# Patient Record
Sex: FEMALE | Race: WHITE | NOT HISPANIC OR LATINO | Employment: PART TIME | URBAN - METROPOLITAN AREA
[De-identification: names, ages, dates, MRNs, and addresses within clinical notes are randomized per-mention and may not be internally consistent; named-entity substitution may affect disease eponyms.]

---

## 2017-01-23 ENCOUNTER — ALLSCRIPTS OFFICE VISIT (OUTPATIENT)
Dept: OTHER | Facility: OTHER | Age: 42
End: 2017-01-23

## 2017-01-23 DIAGNOSIS — Z12.31 ENCOUNTER FOR SCREENING MAMMOGRAM FOR MALIGNANT NEOPLASM OF BREAST: ICD-10-CM

## 2017-01-23 PROCEDURE — 87624 HPV HI-RISK TYP POOLED RSLT: CPT | Performed by: OBSTETRICS & GYNECOLOGY

## 2017-01-23 PROCEDURE — G0145 SCR C/V CYTO,THINLAYER,RESCR: HCPCS | Performed by: OBSTETRICS & GYNECOLOGY

## 2017-01-24 ENCOUNTER — LAB REQUISITION (OUTPATIENT)
Dept: LAB | Facility: HOSPITAL | Age: 42
End: 2017-01-24
Payer: COMMERCIAL

## 2017-01-24 DIAGNOSIS — Z01.419 ENCOUNTER FOR GYNECOLOGICAL EXAMINATION WITHOUT ABNORMAL FINDING: ICD-10-CM

## 2017-01-26 LAB
HPV RRNA GENITAL QL NAA+PROBE: NORMAL
LAB AP GYN PRIMARY INTERPRETATION: NORMAL
LAB AP LMP: NORMAL
Lab: NORMAL

## 2017-01-27 ENCOUNTER — GENERIC CONVERSION - ENCOUNTER (OUTPATIENT)
Dept: OTHER | Facility: OTHER | Age: 42
End: 2017-01-27

## 2017-03-28 ENCOUNTER — GENERIC CONVERSION - ENCOUNTER (OUTPATIENT)
Dept: OTHER | Facility: OTHER | Age: 42
End: 2017-03-28

## 2017-10-10 ENCOUNTER — APPOINTMENT (OUTPATIENT)
Dept: LAB | Age: 42
End: 2017-10-10
Attending: PREVENTIVE MEDICINE

## 2017-10-10 ENCOUNTER — TRANSCRIBE ORDERS (OUTPATIENT)
Dept: ADMINISTRATIVE | Age: 42
End: 2017-10-10

## 2017-10-10 DIAGNOSIS — Z01.84 IMMUNITY STATUS TESTING: ICD-10-CM

## 2017-10-10 DIAGNOSIS — Z01.84 IMMUNITY STATUS TESTING: Primary | ICD-10-CM

## 2017-10-10 LAB — RUBV IGG SERPL IA-ACNC: >175 IU/ML

## 2017-10-10 PROCEDURE — 86735 MUMPS ANTIBODY: CPT

## 2017-10-10 PROCEDURE — 86480 TB TEST CELL IMMUN MEASURE: CPT

## 2017-10-10 PROCEDURE — 86787 VARICELLA-ZOSTER ANTIBODY: CPT

## 2017-10-10 PROCEDURE — 86765 RUBEOLA ANTIBODY: CPT

## 2017-10-10 PROCEDURE — 86762 RUBELLA ANTIBODY: CPT

## 2017-10-10 PROCEDURE — 36415 COLL VENOUS BLD VENIPUNCTURE: CPT

## 2017-10-12 LAB
ANNOTATION COMMENT IMP: NORMAL
GAMMA INTERFERON BACKGROUND BLD IA-ACNC: 0.03 IU/ML
M TB IFN-G BLD-IMP: NEGATIVE
M TB IFN-G CD4+ BCKGRND COR BLD-ACNC: 0.01 IU/ML
M TB IFN-G CD4+ T-CELLS BLD-ACNC: 0.04 IU/ML
MEV IGG SER QL: NORMAL
MITOGEN IGNF BLD-ACNC: 9.22 IU/ML
MUV IGG SER QL: NORMAL
QUANTIFERON-TB GOLD IN TUBE: NORMAL
SERVICE CMNT-IMP: NORMAL
VZV IGG SER IA-ACNC: NORMAL

## 2017-12-06 ENCOUNTER — ALLSCRIPTS OFFICE VISIT (OUTPATIENT)
Dept: OTHER | Facility: OTHER | Age: 42
End: 2017-12-06

## 2018-01-10 NOTE — RESULT NOTES
Verified Results  (1) THIN PREP PAP WITH IMAGING [id number] 12:00AM Estefany Alba     Test Name Result Flag Reference   LAB AP CASE REPORT (Report)     Gynecologic Cytology Report            Case: [id number]                  Authorizing Provider: Susie Chamberlain MD  Collected:      01/23/2017           First Screen:     NILAY Last Received:      01/25/2017 1216        Specimen:  LIQUID-BASED PAP, SCREENING, Endocervical   HPV HIGH RISK RESULT (Report)     HPV, High Risk: HPV NEG, HPV16 NEG, HPV18 NEG      Other High Risk HPV Negative, HPV 16 Negative, HPV 18 Negative  HPV types: 16,18,31,33,35,39,45,51,52,56,58,59,66 and 68 DNA are undetectable or below the pre-set threshold  Roche?s FDA approved Bess 4800 is utilized with strict adherence to the ?s instruction  manual to test for the presence of High-Risk HPV DNA, as well as HPV 16 and HPV 18  This instrument  has been validated by our laboratory and/or by the   A negative result does not preclude the presence of HPV infection because results depend on adequate  specimen collection, absence of inhibitors and sufficient DNA to be detected  Additionally, HPV negative  results are not intended to prevent women from proceeding to colposcopy if clinically warranted  Positive HPV test results indicate the presence of any one or more of the high risk types, but since patients  are often co-infected with low-risk types it does not rule out the presence of low-risk types in patients  with mixed infections  LAB AP GYN PRIMARY INTERPRETATION      Negative for intraepithelial lesion or malignancy  Electronically signed by NILAY Last on 1/26/2017 at 3:47 PM   LAB AP GYN SPECIMEN ADEQUACY      Satisfactory for evaluation  Scant cellularity     LAB AP GYN ADDITIONAL INFORMATION (Report)     [employer]'s FDA approved ,  and ThinPrep Imaging System are   utilized with strict adherence to the 's instruction manual to   prepare gynecologic and non-gynecologic cytology specimens for the   production of ThinPrep slides as well as for gynecologic ThinPrep imaging  These processes have been validated by our laboratory and/or by the     The Pap test is not a diagnostic procedure and should not be used as the   sole means to detect cervical cancer  It is only a screening procedure to   aid in the detection of cervical cancer and its precursors  Both   false-negative and false-positive results have been experienced  Your   patient's test result should be interpreted in this context together with   the history and clinical findings     LAB AP LMP 1/11/2017

## 2018-01-13 VITALS
HEART RATE: 88 BPM | HEIGHT: 63 IN | WEIGHT: 103.5 LBS | BODY MASS INDEX: 18.34 KG/M2 | DIASTOLIC BLOOD PRESSURE: 62 MMHG | SYSTOLIC BLOOD PRESSURE: 96 MMHG | RESPIRATION RATE: 16 BRPM

## 2018-01-22 VITALS — BODY MASS INDEX: 18.96 KG/M2 | HEIGHT: 63 IN | WEIGHT: 107 LBS

## 2018-01-23 NOTE — CONSULTS
History of Present Illness  Today, I am seeing Mrs Andre Mancia for the first time  She is a healthy 38years old female who complaints about the wrinkles on lateral aspect of eyelid and crow's feet when she smiles  Patient refers she has had 20 Units of Botox in the past with a good result although noticed a difference between the two sides  Discussion/Summary    Ms Sylvia Juares is a 38 y o  female presenting with several cosmetic concerns  Her main priority is the appearance of her lateral lower eyelid and crow's-feet dynamic wrinkles  We discussed the use of neurotoxin to treat this areas  We reviewed the general risks of botulinum toxin, and more specifically the risks of injections when working around the eye, including bruising, asymmetry, vision impairment  She proceed with Botox injection  See above procedure note for details  Total of 15 units to treat bilateal crow'sfeet was used  Tolerated well  I discussed in detail about having a routine skin regimen and use of daily sunblock  Patient was also refereed to meet out        Follow up in 3-4 months PRN      Signatures   Electronically signed by : DARRELL Clinton ; Dec  6 2017  9:15AM EST                       (Author)

## 2018-04-08 ENCOUNTER — HOSPITAL ENCOUNTER (EMERGENCY)
Facility: HOSPITAL | Age: 43
Discharge: HOME | End: 2018-04-08
Attending: EMERGENCY MEDICINE | Admitting: EMERGENCY MEDICINE
Payer: COMMERCIAL

## 2018-04-08 ENCOUNTER — APPOINTMENT (EMERGENCY)
Dept: RADIOLOGY | Facility: HOSPITAL | Age: 43
End: 2018-04-08
Payer: COMMERCIAL

## 2018-04-08 VITALS
BODY MASS INDEX: 19.92 KG/M2 | TEMPERATURE: 98.4 F | RESPIRATION RATE: 18 BRPM | OXYGEN SATURATION: 100 % | DIASTOLIC BLOOD PRESSURE: 86 MMHG | HEART RATE: 87 BPM | WEIGHT: 112.43 LBS | HEIGHT: 63 IN | SYSTOLIC BLOOD PRESSURE: 116 MMHG

## 2018-04-08 DIAGNOSIS — R00.2 HEART PALPITATIONS: ICD-10-CM

## 2018-04-08 DIAGNOSIS — I49.3 PVC'S (PREMATURE VENTRICULAR CONTRACTIONS): Primary | ICD-10-CM

## 2018-04-08 LAB
ALBUMIN SERPL BCP-MCNC: 3.9 G/DL (ref 3.5–5)
ALP SERPL-CCNC: 47 U/L (ref 46–116)
ALT SERPL W P-5'-P-CCNC: 22 U/L (ref 12–78)
ANION GAP SERPL CALCULATED.3IONS-SCNC: 9 MMOL/L (ref 4–13)
AST SERPL W P-5'-P-CCNC: 13 U/L (ref 5–45)
ATRIAL RATE: 77 BPM
BASOPHILS # BLD AUTO: 0.04 THOUSANDS/ΜL (ref 0–0.1)
BASOPHILS NFR BLD AUTO: 1 % (ref 0–1)
BILIRUB SERPL-MCNC: 0.3 MG/DL (ref 0.2–1)
BUN SERPL-MCNC: 13 MG/DL (ref 5–25)
CALCIUM SERPL-MCNC: 8.5 MG/DL (ref 8.3–10.1)
CHLORIDE SERPL-SCNC: 105 MMOL/L (ref 100–108)
CO2 SERPL-SCNC: 25 MMOL/L (ref 21–32)
CREAT SERPL-MCNC: 0.78 MG/DL (ref 0.6–1.3)
EOSINOPHIL # BLD AUTO: 0.11 THOUSAND/ΜL (ref 0–0.61)
EOSINOPHIL NFR BLD AUTO: 2 % (ref 0–6)
ERYTHROCYTE [DISTWIDTH] IN BLOOD BY AUTOMATED COUNT: 12.3 % (ref 11.6–15.1)
GFR SERPL CREATININE-BSD FRML MDRD: 92 ML/MIN/1.73SQ M
GLUCOSE SERPL-MCNC: 106 MG/DL (ref 65–140)
HCT VFR BLD AUTO: 38.5 % (ref 34.8–46.1)
HGB BLD-MCNC: 12.5 G/DL (ref 11.5–15.4)
LYMPHOCYTES # BLD AUTO: 1.67 THOUSANDS/ΜL (ref 0.6–4.47)
LYMPHOCYTES NFR BLD AUTO: 26 % (ref 14–44)
MCH RBC QN AUTO: 30.4 PG (ref 26.8–34.3)
MCHC RBC AUTO-ENTMCNC: 32.5 G/DL (ref 31.4–37.4)
MCV RBC AUTO: 94 FL (ref 82–98)
MONOCYTES # BLD AUTO: 0.79 THOUSAND/ΜL (ref 0.17–1.22)
MONOCYTES NFR BLD AUTO: 12 % (ref 4–12)
NEUTROPHILS # BLD AUTO: 3.85 THOUSANDS/ΜL (ref 1.85–7.62)
NEUTS SEG NFR BLD AUTO: 59 % (ref 43–75)
P AXIS: 54 DEGREES
PLATELET # BLD AUTO: 214 THOUSANDS/UL (ref 149–390)
PMV BLD AUTO: 9.4 FL (ref 8.9–12.7)
POTASSIUM SERPL-SCNC: 4.3 MMOL/L (ref 3.5–5.3)
PR INTERVAL: 98 MS
PROT SERPL-MCNC: 7.1 G/DL (ref 6.4–8.2)
QRS AXIS: 77 DEGREES
QRSD INTERVAL: 76 MS
QT INTERVAL: 358 MS
QTC INTERVAL: 405 MS
RBC # BLD AUTO: 4.11 MILLION/UL (ref 3.81–5.12)
SODIUM SERPL-SCNC: 139 MMOL/L (ref 136–145)
T WAVE AXIS: 63 DEGREES
TSH SERPL DL<=0.05 MIU/L-ACNC: 4.13 UIU/ML (ref 0.36–3.74)
VENTRICULAR RATE: 77 BPM
WBC # BLD AUTO: 6.46 THOUSAND/UL (ref 4.31–10.16)

## 2018-04-08 PROCEDURE — 85025 COMPLETE CBC W/AUTO DIFF WBC: CPT

## 2018-04-08 PROCEDURE — 84443 ASSAY THYROID STIM HORMONE: CPT | Performed by: EMERGENCY MEDICINE

## 2018-04-08 PROCEDURE — 36415 COLL VENOUS BLD VENIPUNCTURE: CPT

## 2018-04-08 PROCEDURE — 93010 ELECTROCARDIOGRAM REPORT: CPT | Performed by: INTERNAL MEDICINE

## 2018-04-08 PROCEDURE — 99285 EMERGENCY DEPT VISIT HI MDM: CPT

## 2018-04-08 PROCEDURE — 93005 ELECTROCARDIOGRAM TRACING: CPT

## 2018-04-08 PROCEDURE — 80053 COMPREHEN METABOLIC PANEL: CPT

## 2018-04-09 NOTE — DISCHARGE INSTRUCTIONS
Heart Palpitations   WHAT YOU NEED TO KNOW:   Heart palpitations are feelings that your heart races, jumps, throbs, or flutters  You may feel extra beats, no beats for a short time, or skipped beats  You may have these feelings in your chest, throat, or neck  They may happen when you are sitting, standing, or lying  Heart palpitations may be frightening, but are usually not caused by a serious problem  DISCHARGE INSTRUCTIONS:   Call 911 or have someone else call for any of the following:   · You have any of the following signs of a heart attack:      ¨ Squeezing, pressure, or pain in your chest that lasts longer than 5 minutes or returns    ¨ Discomfort or pain in your back, neck, jaw, stomach, or arm     ¨ Trouble breathing    ¨ Nausea or vomiting    ¨ Lightheadedness or a sudden cold sweat, especially with chest pain or trouble breathing    · You have any of the following signs of a stroke:      ¨ Numbness or drooping on one side of your face     ¨ Weakness in an arm or leg    ¨ Confusion or difficulty speaking    ¨ Dizziness, a severe headache, or vision loss    · You faint or lose consciousness  Return to the emergency department if:   · Your palpitations happen more often or get more intense  Contact your healthcare provider if:   · You have new or worsening swelling in your feet or ankles  · You have questions or concerns about your condition or care  Follow up with your healthcare provider as directed: You may need to follow up with a cardiologist  Alberto Eisenberg may need tests to check for heart problems that cause palpitations  Write down your questions so you remember to ask them during your visits  Keep a record:  Write down when your palpitations start and stop, what you were doing when they started, and your symptoms  Keep track of what you ate or drank within a few hours of your palpitations  Include anything that seemed to help your symptoms, such as lying down or holding your breath   This record will help you and your healthcare provider learn what triggers your palpitations  Bring this record with you to your follow up visits  Help prevent heart palpitations:   · Manage stress and anxiety  Find ways to relax such as listening to music, meditating, or doing yoga  Exercise can also help decrease stress and anxiety  Talk to someone you trust about your stress or anxiety  You can also talk to a therapist      · Get plenty of sleep every night  Ask your healthcare provider how much sleep you need each night  · Do not drink caffeine or alcohol  Caffeine and alcohol can make your palpitations worse  Caffeine is found in soda, coffee, tea, chocolate, and drinks that increase your energy  · Do not smoke  Nicotine and other chemicals in cigarettes and cigars may damage your heart and blood vessels  Ask your healthcare provider for information if you currently smoke and need help to quit  E-cigarettes or smokeless tobacco still contain nicotine  Talk to your healthcare provider before you use these products  · Do not use illegal drugs  Talk to your healthcare provider if you use illegal drugs and want help to quit  © 2017 [facility] Information is for End User's use only and may not be sold, redistributed or otherwise used for commercial purposes  All illustrations and images included in CareNotes® are the copyrighted property of A D [employer] M [employer]  or Jamie Dilma  The above information is an  only  It is not intended as medical advice for individual conditions or treatments  Talk to your doctor, nurse or pharmacist before following any medical regimen to see if it is safe and effective for you  Premature Ventricular Contractions   WHAT YOU NEED TO KNOW:   Premature ventricular contractions (PVCs) are an interruption in your heart rhythm  They are caused by an early signal for your heart to pump   Your risk of PVCs increases when you drink alcohol or caffeine, or if you are fatigued or stressed  It is very important for you to follow up with your healthcare provider so the cause of your PVC can be diagnosed and treated  DISCHARGE INSTRUCTIONS:   Follow up with your healthcare provider as directed: You may need another EKG within the first 10 days and more testing for up to 12 months  Write down your questions so you remember to ask them during your visits  Medicines:   · Heart medicine  may be given to make your heart beat at a regular rate and rhythm  · Take your medicine as directed  Contact your healthcare provider if you think your medicine is not helping or if you have side effects  Tell him if you are allergic to any medicine  Keep a list of the medicines, vitamins, and herbs you take  Include the amounts, and when and why you take them  Bring the list or the pill bottles to follow-up visits  Carry your medicine list with you in case of an emergency  Contact your healthcare provider if:   · You still have symptoms after treatment, or your symptoms worsen  · You have questions or concerns about your condition or care  Seek care immediately or call 911 if:   · You have any of the following signs of a heart attack:      ¨ Squeezing, pressure, or pain in your chest that lasts longer than 5 minutes or returns    ¨ Discomfort or pain in your back, neck, jaw, stomach, or arm     ¨ Trouble breathing    ¨ Nausea or vomiting    ¨ Lightheadedness or a sudden cold sweat, especially with chest pain or trouble breathing    © 2017 [address] is for End User's use only and may not be sold, redistributed or otherwise used for commercial purposes  All illustrations and images included in CareNotes® are the copyrighted property of A D A M [employer]  or Jamie Perera  The above information is an  only  It is not intended as medical advice for individual conditions or treatments   Talk to your doctor, nurse or pharmacist before following any medical regimen to see if it is safe and effective for you

## 2018-04-09 NOTE — ED PROVIDER NOTES
History  Chief Complaint   Patient presents with    Irregular Heart Beat     Pt presents to ED for evaluation and treatment of irregular heartbeat since yesterday  Previous episode of same 3 weeks ago  Pt states "it feels like my heart stops"       History provided by:  Patient and spouse  Rapid Heart Rate   Palpitations quality:  Irregular  Onset quality:  Sudden  Duration:  1 second  Timing:  Intermittent  Progression:  Unchanged  Chronicity:  New  Context: anxiety    Context comment:  Patient notices occasional skipped beats in her chest when this happened she notices a sensation of a rush of blood up the left side of her neck, making her very anxious so she came here for evaluation  No associated pain  Relieved by:  None tried  Worsened by:  Nothing  Ineffective treatments:  None tried  Associated symptoms: no chest pain, no chest pressure, no cough, no diaphoresis, no dizziness, no nausea, no numbness, no shortness of breath and no vomiting        None       No past medical history on file  No past surgical history on file  No family history on file  I have reviewed and agree with the history as documented  Social History   Substance Use Topics    Smoking status: Never Smoker    Smokeless tobacco: Not on file    Alcohol use No        Review of Systems   Constitutional: Negative for activity change, chills, diaphoresis and fever  HENT: Negative for congestion, sinus pressure and sore throat  Eyes: Negative for pain and visual disturbance  Respiratory: Negative for cough, chest tightness, shortness of breath, wheezing and stridor  Cardiovascular: Positive for palpitations  Negative for chest pain  Gastrointestinal: Negative for abdominal distention, abdominal pain, constipation, diarrhea, nausea and vomiting  Genitourinary: Negative for dysuria and frequency  Musculoskeletal: Negative for neck pain and neck stiffness  Skin: Negative for rash     Neurological: Negative for dizziness, speech difficulty, light-headedness, numbness and headaches  Physical Exam  ED Triage Vitals   Temperature Pulse Respirations Blood Pressure SpO2   04/08/18 2030 04/08/18 2004 04/08/18 2004 04/08/18 2004 04/08/18 2004   98 4 °F (36 9 °C) 87 18 116/86 100 %      Temp Source Heart Rate Source Patient Position - Orthostatic VS BP Location FiO2 (%)   04/08/18 2030 04/08/18 2004 04/08/18 2004 04/08/18 2004 --   Oral Monitor Lying Right arm       Pain Score       04/08/18 2004       No Pain           Orthostatic Vital Signs  Vitals:    04/08/18 2004   BP: 116/86   Pulse: 87   Patient Position - Orthostatic VS: Lying       Physical Exam   Constitutional: She is oriented to person, place, and time  She appears well-developed  No distress  HENT:   Head: Normocephalic and atraumatic  Eyes: Pupils are equal, round, and reactive to light  Neck: Normal range of motion  Neck supple  No tracheal deviation present  Cardiovascular: Normal rate, regular rhythm, normal heart sounds and intact distal pulses  No murmur heard  Pulmonary/Chest: Effort normal and breath sounds normal  No stridor  No respiratory distress  Abdominal: Soft  She exhibits no distension  There is no tenderness  There is no rebound and no guarding  Musculoskeletal: Normal range of motion  Neurological: She is alert and oriented to person, place, and time  Skin: Skin is warm and dry  She is not diaphoretic  No erythema  No pallor  Psychiatric: Her mood appears anxious  Vitals reviewed        ED Medications  Medications - No data to display    Diagnostic Studies  Results Reviewed     Procedure Component Value Units Date/Time    TSH [[id number]]  (Abnormal) Collected:  04/08/18 2010    Lab Status:  Final result Specimen:  Blood from Arm, Right Updated:  04/08/18 2107     TSH [address] [place] [address] 4 128 (H) uIU/mL     Narrative:         Patients undergoing fluorescein dye angiography may retain small amounts of fluorescein in the body for 48-72 hours post procedure  Samples containing fluorescein can produce falsely depressed TSH values  If the patient had this procedure,a specimen should be resubmitted post fluorescein clearance  The recommended reference ranges for TSH during pregnancy are as follows:  First trimester 0 1 to 2 5 uIU/mL  Second trimester  0 2 to 3 0 uIU/mL  Third trimester 0 3 to 3 0 uIU/m      Comprehensive metabolic panel [[id number]] Collected:  04/08/18 2010    Lab Status:  Final result Specimen:  Blood from Arm, Right Updated:  04/08/18 2036     Sodium 139 mmol/L      Potassium 4 3 mmol/L      Chloride 105 mmol/L      CO2 25 mmol/L      Anion Gap 9 mmol/L      BUN 13 mg/dL      Creatinine 0 78 mg/dL      Glucose 106 mg/dL      Calcium 8 5 mg/dL      AST 13 U/L      ALT 22 U/L      Alkaline Phosphatase 47 U/L      Total Protein 7 1 g/dL      Albumin 3 9 g/dL      Total Bilirubin 0 30 mg/dL      eGFR 92 ml/min/1 73sq m     Narrative:         National Kidney Disease Education Program recommendations are as follows:  GFR calculation is accurate only with a steady state creatinine  Chronic Kidney disease less than 60 ml/min/1 73 sq  meters  Kidney failure less than 15 ml/min/1 73 sq  meters      CBC and differential [[id number]]  (Normal) Collected:  04/08/18 2010    Lab Status:  Final result Specimen:  Blood from Arm, Right Updated:  04/08/18 2021     WBC 6 46 Thousand/uL      RBC 4 11 Million/uL      Hemoglobin 12 5 g/dL      Hematocrit 38 5 %      MCV 94 fL      MCH 30 4 pg      MCHC 32 5 g/dL      RDW 12 3 %      MPV 9 4 fL      Platelets 331 Thousands/uL      Neutrophils Relative 59 %      Lymphocytes Relative 26 %      Monocytes Relative 12 %      Eosinophils Relative 2 %      Basophils Relative 1 %      Neutrophils Absolute 3 85 Thousands/µL      Lymphocytes Absolute 1 67 Thousands/µL      Monocytes Absolute 0 79 Thousand/µL      Eosinophils Absolute 0 11 Thousand/µL      Basophils Absolute 0 04 Thousands/µL No orders to display              Procedures  ECG 12 Lead Documentation  Date/Time: 4/8/2018 8:14 PM  Performed by: Srikanth Carpio by: Lara Ascencio     ECG reviewed by me, the ED Provider: yes    Patient location:  ED  Previous ECG:     Previous ECG:  Unavailable  Interpretation:     Interpretation: normal    Rate:     ECG rate:  77    ECG rate assessment: normal    Rhythm:     Rhythm: sinus rhythm    Ectopy:     Ectopy: none    QRS:     QRS axis:  Normal    QRS intervals:  Normal  Conduction:     Conduction: normal    ST segments:     ST segments:  Normal  T waves:     T waves: normal               Phone Contacts  ED Phone Contact    ED Course  ED Course                                MDM  Number of Diagnoses or Management Options  Heart palpitations: new and requires workup  PVC's (premature ventricular contractions): new and requires workup     Amount and/or Complexity of Data Reviewed  Clinical lab tests: ordered and reviewed  Decide to obtain previous medical records or to obtain history from someone other than the patient: yes  Obtain history from someone other than the patient: yes  Review and summarize past medical records: yes  Independent visualization of images, tracings, or specimens: yes      CritCare Time    Disposition  Final diagnoses:   Heart palpitations   PVC's (premature ventricular contractions)     Time reflects when diagnosis was documented in both MDM as applicable and the Disposition within this note     Time User Action Codes Description Comment    4/8/2018  9:09 PM Brooks WASSERMAN Add [R00 2] Heart palpitations     4/8/2018  9:09 PM Brooks WASSERMAN Add [I49 3] PVC's (premature ventricular contractions)     4/8/2018  9:09 PM Greta Yeung Modify [R00 2] Heart palpitations     4/8/2018  9:09 PM Greta Yeung Modify [I49 3] PVC's (premature ventricular contractions)       ED Disposition     ED Disposition Condition Comment    Discharge  Vanessa Labor discharge to home/self care  Condition at discharge: Good        Follow-up Information     Follow up With Specialties Details Why Nick Puentes III, MD Internal Medicine Go to If symptoms worsen [address] [facility] [phone]          Patient's Medications    No medications on file     No discharge procedures on file      ED Provider  Electronically Signed by           Nguyen Pablo DO  04/08/18 3427

## 2018-04-12 RX ORDER — TRETINOIN 0.5 MG/G
CREAM TOPICAL
COMMUNITY
Start: 2017-09-29 | End: 2018-10-03 | Stop reason: ALTCHOICE

## 2018-04-13 ENCOUNTER — OFFICE VISIT (OUTPATIENT)
Dept: FAMILY MEDICINE CLINIC | Facility: CLINIC | Age: 43
End: 2018-04-13
Payer: COMMERCIAL

## 2018-04-13 ENCOUNTER — APPOINTMENT (OUTPATIENT)
Dept: LAB | Facility: CLINIC | Age: 43
End: 2018-04-13
Payer: COMMERCIAL

## 2018-04-13 VITALS
RESPIRATION RATE: 16 BRPM | WEIGHT: 107.4 LBS | DIASTOLIC BLOOD PRESSURE: 60 MMHG | OXYGEN SATURATION: 99 % | HEART RATE: 78 BPM | BODY MASS INDEX: 19.03 KG/M2 | HEIGHT: 63 IN | SYSTOLIC BLOOD PRESSURE: 116 MMHG

## 2018-04-13 DIAGNOSIS — R79.89 ELEVATED TSH: Primary | ICD-10-CM

## 2018-04-13 DIAGNOSIS — F51.01 PRIMARY INSOMNIA: ICD-10-CM

## 2018-04-13 DIAGNOSIS — R79.89 ELEVATED TSH: ICD-10-CM

## 2018-04-13 DIAGNOSIS — I49.3 PVC (PREMATURE VENTRICULAR CONTRACTION): ICD-10-CM

## 2018-04-13 DIAGNOSIS — F32.1 CURRENT MODERATE EPISODE OF MAJOR DEPRESSIVE DISORDER WITHOUT PRIOR EPISODE (HCC): ICD-10-CM

## 2018-04-13 LAB — TSH SERPL DL<=0.05 MIU/L-ACNC: 1.98 UIU/ML (ref 0.36–3.74)

## 2018-04-13 PROCEDURE — 36415 COLL VENOUS BLD VENIPUNCTURE: CPT

## 2018-04-13 PROCEDURE — 99204 OFFICE O/P NEW MOD 45 MIN: CPT | Performed by: PHYSICIAN ASSISTANT

## 2018-04-13 PROCEDURE — 84443 ASSAY THYROID STIM HORMONE: CPT

## 2018-04-13 NOTE — ASSESSMENT & PLAN NOTE
TSH of 4 1 at the ED 5 days ago  Slightly elevated  It may have a role with insomnia and activated mood  - Repeat TSH with T4 to recheck

## 2018-04-13 NOTE — ASSESSMENT & PLAN NOTE
Stressors include work and marriage  Neither are likely to change soon  Pt desires not to be on long term medical management  PHQ-9 score of 15  - Recommended Cognitive behavioral therapy  Discussed [employer] program which is free to employees  - Referral to see Therapist  - Requested FU in 1 month  - Discussed short term SSRI surrounding PMS time period  Pt deferred at this time

## 2018-04-13 NOTE — PROGRESS NOTES
Assessment/Plan:    Current moderate episode of major depressive disorder without prior episode ([facility] [address] )  Stressors include work and marriage  Neither are likely to change soon  Pt desires not to be on long term medical management  PHQ-9 score of 15  - Recommended Cognitive behavioral therapy  Discussed [employer] program which is free to employees  - Referral to see Therapist  - Requested FU in 1 month  - Discussed short term SSRI surrounding PMS time period  Pt deferred at this time  Primary insomnia  Issue is sleep maintenance  Her sleep hygiene seems adequate  - Discussed Z drugs that may help with sleep maintenance  - Pt will continue trying melatonin and see if CBT will improve her sleep first       Elevated TSH  TSH of 4 1 at the ED 5 days ago  Slightly elevated  It may have a role with insomnia and activated mood  - Repeat TSH with T4 to recheck  PVC (premature ventricular contraction)  She previously received a cardiac workup, 6 years ago  Recent exacerbations may be secondary to depression  - Continued observation       Diagnoses and all orders for this visit:    Elevated TSH  -     TSH, 3rd generation with T4 reflex; Future    Primary insomnia    Current moderate episode of major depressive disorder [facility]  -     Ambulatory referral to Social Work; Future    PVC (premature ventricular contraction)  -     TSH, 3rd generation with T4 reflex; Future  -     Ambulatory referral to Cardiology; Future          Subjective:      Patient ID: Arnulfo Bailey is a 37 y o  female, here for an annual wellness visit  She is a RN at [employer]      She is having depressive symptoms including tearfulness She has not history with medications or seeing a therapist  She has not wanted to see anyone or take any medications and has worked in mental health in the past  She does express transient thoughts of harming her self but none recently and she has no plans and has never attempted to harm herself  She states she is committed to being there for her two children  She sleeps 4-6 hours a night for several years  It is aggrivated itself in the past 6 months  She increased her work to 50 hours a week and her  was laid off  She does not have a strong support system  She wakes 1-2 times a night  She has not problem with sleep induction  She has been "self-medicating" with Nyquil and benadryl which provided some improvement  She saw a cardiologist 6 year ago with an echo and stress test which was WNL  She has had palpitations intermittently in the past  2 weeks ago she was had them 2 days straight  Last weekend it was having every two minutes  She then went to the ED and was diagnosed with PCVs  She feels that it might be anxiety provoked  Previous Dental Visit: 6 month prior, no known dental issues  Previous Eye Exam: 2 years prior, no vision problems    Last Gyn: last year    Prior Vaccines:   - Last Tetanus vaccine: 2012    Last Pap: last year, normal findings  Last Mammo: Last year, dense breasts, WNL  The following portions of the patient's history were reviewed and updated as appropriate: allergies, current medications, past family history, past medical history, past social history, past surgical history and problem list     Review of Systems   Constitutional: Negative for activity change, chills, fatigue, fever and unexpected weight change  HENT: Negative for rhinorrhea, sinus pressure and sore throat  Eyes: Negative for visual disturbance  Respiratory: Negative for cough, shortness of breath and wheezing  Cardiovascular: Positive for palpitations  Negative for chest pain and leg swelling  Gastrointestinal: Negative for abdominal pain, constipation, diarrhea and nausea  Endocrine: Negative for cold intolerance and heat intolerance  Genitourinary: Negative for difficulty urinating  Musculoskeletal: Negative for arthralgias and myalgias     Skin: Negative for pallor, rash and wound  Allergic/Immunologic: Negative for environmental allergies and food allergies  Neurological: Negative for dizziness, seizures, speech difficulty, weakness and headaches  Hematological: Negative for adenopathy  Psychiatric/Behavioral: Positive for dysphoric mood and sleep disturbance  Negative for behavioral problems  The patient is nervous/anxious  Social History     Social History    Marital status: /Civil Union     Spouse name: N/A    Number of children: N/A    Years of education: N/A     Occupational History    Not on file  Social History Main Topics    Smoking status: Never Smoker    Smokeless tobacco: Never Used    Alcohol use No    Drug use: No    Sexual activity: Not Currently     Partners: Male     Other Topics Concern    Not on file     Social History Narrative    Exercise habits:             Objective:  /60   Pulse 78   Resp 16   Ht 5' 3" (1 6 m)   Wt 48 7 kg (107 lb 6 4 oz)   LMP 2018 (Approximate)   SpO2 99%   BMI 19 03 kg/m²     PHQ-9 Depression Screening    PHQ-9:    Frequency of the following problems over the past two weeks:       Little interest or pleasure in doing things:  3 - nearly every day  Feeling down, depressed, or hopeless:  3 - nearly every day  Trouble falling or staying asleep, or sleeping too much:  3 - nearly every day  Feeling tired or having little energy:  3 - nearly every day  Poor appetite or overeatin - not at all  Feeling bad about yourself - or that you are a failure or have let yourself or your family down:  1 - several days  Trouble concentrating on things, such as reading the newspaper or watching television:  1 - several days  Moving or speaking so slowly that other people could have noticed   Or the opposite - being so fidgety or restless that you have been moving around a lot more than usual:  0 - not at all  Thoughts that you would be better off dead, or of hurting yourself in some way: 1 - several days  PHQ-2 Score:  6  PHQ-9 Score:  15          Physical Exam   Constitutional: She is oriented to person, place, and time  She appears well-developed and well-nourished  No distress  HENT:   Head: Normocephalic and atraumatic  Right Ear: External ear normal    Left Ear: External ear normal    Mouth/Throat: Oropharynx is clear and moist  No oropharyngeal exudate  Eyes: Pupils are equal, round, and reactive to light  Neck: Normal range of motion  Neck supple  No thyromegaly present  Cardiovascular: Normal rate, regular rhythm, normal heart sounds and intact distal pulses  Exam reveals no gallop and no friction rub  No murmur heard  Pulmonary/Chest: Effort normal and breath sounds normal  No respiratory distress  She has no wheezes  She has no rales  Abdominal: Soft  Bowel sounds are normal  She exhibits no distension  There is no tenderness  There is no rebound and no guarding  Musculoskeletal: Normal range of motion  She exhibits no edema or deformity  Lymphadenopathy:     She has no cervical adenopathy  Neurological: She is alert and oriented to person, place, and time  Skin: Skin is warm and dry  No rash noted  She is not diaphoretic  No erythema  Psychiatric: She has a normal mood and affect

## 2018-04-13 NOTE — ASSESSMENT & PLAN NOTE
She previously received a cardiac workup, 6 years ago    Recent exacerbations may be secondary to depression  - Continued observation

## 2018-06-29 ENCOUNTER — APPOINTMENT (OUTPATIENT)
Dept: LAB | Facility: CLINIC | Age: 43
End: 2018-06-29

## 2018-06-29 ENCOUNTER — TRANSCRIBE ORDERS (OUTPATIENT)
Dept: LAB | Facility: CLINIC | Age: 43
End: 2018-06-29

## 2018-06-29 DIAGNOSIS — Z00.8 HEALTH EXAMINATION IN POPULATION SURVEY: ICD-10-CM

## 2018-06-29 DIAGNOSIS — Z00.8 HEALTH EXAMINATION IN POPULATION SURVEY: Primary | ICD-10-CM

## 2018-06-29 LAB
CHOLEST SERPL-MCNC: 184 MG/DL (ref 50–200)
EST. AVERAGE GLUCOSE BLD GHB EST-MCNC: 103 MG/DL
HBA1C MFR BLD: 5.2 % (ref 4.2–6.3)
HDLC SERPL-MCNC: 70 MG/DL (ref 40–60)
LDLC SERPL CALC-MCNC: 101 MG/DL (ref 0–100)
NONHDLC SERPL-MCNC: 114 MG/DL
TRIGL SERPL-MCNC: 64 MG/DL

## 2018-06-29 PROCEDURE — 83036 HEMOGLOBIN GLYCOSYLATED A1C: CPT | Performed by: PREVENTIVE MEDICINE

## 2018-06-29 PROCEDURE — 36415 COLL VENOUS BLD VENIPUNCTURE: CPT | Performed by: PREVENTIVE MEDICINE

## 2018-06-29 PROCEDURE — 80061 LIPID PANEL: CPT

## 2018-07-03 ENCOUNTER — OFFICE VISIT (OUTPATIENT)
Dept: CARDIOLOGY CLINIC | Facility: CLINIC | Age: 43
End: 2018-07-03
Payer: COMMERCIAL

## 2018-07-03 VITALS
WEIGHT: 109 LBS | OXYGEN SATURATION: 98 % | HEIGHT: 63 IN | HEART RATE: 84 BPM | DIASTOLIC BLOOD PRESSURE: 60 MMHG | SYSTOLIC BLOOD PRESSURE: 122 MMHG | BODY MASS INDEX: 19.31 KG/M2

## 2018-07-03 DIAGNOSIS — Z82.49 FAMILY HISTORY OF ISCHEMIC HEART DISEASE (IHD): ICD-10-CM

## 2018-07-03 DIAGNOSIS — I49.3 PVC (PREMATURE VENTRICULAR CONTRACTION): Primary | ICD-10-CM

## 2018-07-03 PROCEDURE — 99205 OFFICE O/P NEW HI 60 MIN: CPT | Performed by: INTERNAL MEDICINE

## 2018-07-03 PROCEDURE — 93000 ELECTROCARDIOGRAM COMPLETE: CPT | Performed by: INTERNAL MEDICINE

## 2018-07-03 NOTE — PATIENT INSTRUCTIONS
Premature Ventricular Contractions   WHAT YOU NEED TO KNOW:   What are premature ventricular contractions? Premature ventricular contractions (PVCs) are an interruption in your heart rhythm  They are caused by an early signal for your heart to pump  Your risk of PVCs increases when you drink alcohol or caffeine, or if you are fatigued or stressed  It is very important for you to follow up with your healthcare provider so the cause of your PVC can be diagnosed and treated  What are symptoms of PVCs? · A skipped heartbeat     · A fast heartbeat    · Chest pain     · Lightheadedness, dizziness, or faintness    · Tiredness with exercise or activity  How are PVCs diagnosed? Your healthcare provider will ask if you have a family history of heart problems  You may also need one or more of the following:  · An EKG  records your heart rhythm and how fast your heart beats  It is used to check for PVCs  · A Holter monitor  is a device that you wear for a period of time  It records how fast your heart beats, and if it beats in a regular pattern  You may need to wear it up to 72 hours  This will show how frequent your PVCs are during your normal daily activities  · An echocardiogram  (echo) is a type of ultrasound that shows the movement and blood vessels of your heart on a monitor  How are PVCs treated? Your treatment will depend on the cause of your PVC  You may need any of the following:  · Heart medicine  may be given to make your heart beat at a regular rate and rhythm  · Cardiac ablation  is a procedure that is used to treat an abnormal heart rhythm  Ask your healthcare provider for more information  When should I contact my healthcare provider? · You still have symptoms after treatment, or your symptoms worsen  · You have questions or concerns about your condition or care  When should I seek immediate care or call 911?    · You have any of the following signs of a heart attack:      ¨ Squeezing, pressure, or pain in your chest that lasts longer than 5 minutes or returns    ¨ Discomfort or pain in your back, neck, jaw, stomach, or arm     ¨ Trouble breathing    ¨ Nausea or vomiting    ¨ Lightheadedness or a sudden cold sweat, especially with chest pain or trouble breathing    CARE AGREEMENT:   You have the right to help plan your care  Learn about your health condition and how it may be treated  Discuss treatment options with your caregivers to decide what care you want to receive  You always have the right to refuse treatment  The above information is an  only  It is not intended as medical advice for individual conditions or treatments  Talk to your doctor, nurse or pharmacist before following any medical regimen to see if it is safe and effective for you  © 2017 [employer] Information is for End User's use only and may not be sold, redistributed or otherwise used for commercial purposes  All illustrations and images included in CareNotes® are the copyrighted property of [employer]  or Jamie Perera

## 2018-07-03 NOTE — PROGRESS NOTES
Cardiology Consultation     Vanessa Aguilar  [id number]  1975  [address] [facility] [place] [id number]    1  PVC (premature ventricular contraction)  Ambulatory referral to Cardiology    POCT ECG   2  Family history of ischemic heart disease (IHD)       Chief Complaint   Patient presents with    Establish Care     PVC    Palpitations     HPI: Patient is here for evaluation and management of increasing burden of PVCs recently  April was particularly bothersome, but now improved again  Patient Active Problem List   Diagnosis    Vitamin D deficiency    Family history of ischemic heart disease (IHD)    Diffuse cystic mastopathy    Current moderate episode of major depressive disorder without prior episode ([facility] [address] )    PVC (premature ventricular contraction)    Primary insomnia    Elevated TSH     History reviewed  No pertinent past medical history  Social History     Social History    Marital status: /Civil Union     Spouse name: N/A    Number of children: N/A    Years of education: N/A     Occupational History    Not on file  Social History Main Topics    Smoking status: Never Smoker    Smokeless tobacco: Never Used    Alcohol use No    Drug use: No    Sexual activity: Not Currently     Partners: Male     Other Topics Concern    Not on file     Social History Narrative    Exercise habits:          Family History   Problem Relation Age of Onset    Osteoarthritis Mother     Hyperlipidemia Mother     No Known Problems Father     Asthma Brother     Heart attack Maternal Grandfather 48    Hypertension Neg Hx     Coronary artery disease Neg Hx     Diabetes Neg Hx     Stroke Neg Hx     Cancer Neg Hx     Sudden death Neg Hx         SCD     History reviewed  No pertinent surgical history      Current Outpatient Prescriptions:     tretinoin (RETIN-A) 0 05 % cream, Apply topically, Disp: , Rfl:   No Known Allergies  Vitals:    07/03/18 1513   BP: 122/60   BP Location: Left arm   Patient Position: Sitting   Cuff Size: Adult   Pulse: 84   SpO2: 98%   Weight: 49 4 kg (109 lb)   Height: 5' 3" (1 6 m)       Labs:  Appointment on 06/29/2018   Component Date Value    Cholesterol 06/29/2018 184     Triglycerides 06/29/2018 64     HDL, Direct 06/29/2018 70*    LDL Calculated 06/29/2018 101*    Non-HDL-Chol (CHOL-HDL) 06/29/2018 114    Transcribe Orders on 06/29/2018   Component Date Value    Hemoglobin A1C 06/29/2018 5 2     EAG 06/29/2018 103    Appointment on 04/13/2018   Component Date Value    TSH 3RD GENERATON 04/13/2018 1 980    Admission on 04/08/2018, Discharged on 04/08/2018   Component Date Value    Sodium 04/08/2018 139     Potassium 04/08/2018 4 3     Chloride 04/08/2018 105     CO2 04/08/2018 25     Anion Gap 04/08/2018 9     BUN 04/08/2018 13     Creatinine 04/08/2018 0 78     Glucose 04/08/2018 106     Calcium 04/08/2018 8 5     AST 04/08/2018 13     ALT 04/08/2018 22     Alkaline Phosphatase 04/08/2018 47     Total Protein 04/08/2018 7 1     Albumin 04/08/2018 3 9     Total Bilirubin 04/08/2018 0 30     eGFR 04/08/2018 92     WBC 04/08/2018 6 46     RBC 04/08/2018 4 11     Hemoglobin 04/08/2018 12 5     Hematocrit 04/08/2018 38 5     MCV 04/08/2018 94     MCH 04/08/2018 30 4     MCHC 04/08/2018 32 5     RDW 04/08/2018 12 3     MPV 04/08/2018 9 4     Platelets 46/17/3276 214     Neutrophils Relative 04/08/2018 59     Lymphocytes Relative 04/08/2018 26     Monocytes Relative 04/08/2018 12     Eosinophils Relative 04/08/2018 2     Basophils Relative 04/08/2018 1     Neutrophils Absolute 04/08/2018 3 85     Lymphocytes Absolute 04/08/2018 1 67     Monocytes Absolute 04/08/2018 0 79     Eosinophils Absolute 04/08/2018 0 11     Basophils Absolute 04/08/2018 0 04     Ventricular Rate 04/08/2018 77     Atrial Rate 04/08/2018 77     NM Interval 04/08/2018 98     QRSD Interval 04/08/2018 76     QT Interval 04/08/2018 358     QTC Interval 04/08/2018 405     P Axis 04/08/2018 54     QRS Axis 04/08/2018 77     T Wave Axis 04/08/2018 63     TSH 3RD GENERATON 04/08/2018 4 128*     Lab Results   Component Value Date    CHOL 184 06/29/2018    TRIG 64 06/29/2018    HDL 70 (H) 06/29/2018     Imaging: No results found  Review of Systems:  Review of Systems   Constitutional: Negative for activity change, appetite change, chills, diaphoresis, fatigue and unexpected weight change  HENT: Negative for hearing loss, nosebleeds and sore throat  Eyes: Negative for photophobia and visual disturbance  Respiratory: Negative for cough, chest tightness, shortness of breath and wheezing  Cardiovascular: Positive for palpitations  Negative for chest pain and leg swelling  Gastrointestinal: Negative for abdominal pain, diarrhea, nausea and vomiting  Endocrine: Negative for polyuria  Genitourinary: Negative for dysuria, frequency and hematuria  Musculoskeletal: Negative for arthralgias, back pain, gait problem and neck pain  Skin: Negative for pallor and rash  Neurological: Negative for dizziness, syncope and headaches  Hematological: Does not bruise/bleed easily  Psychiatric/Behavioral: Negative for behavioral problems and confusion  Physical Exam:  Physical Exam   Constitutional: She is oriented to person, place, and time  She appears well-developed and well-nourished  HENT:   Head: Normocephalic and atraumatic  Nose: Nose normal    Eyes: EOM are normal  Pupils are equal, round, and reactive to light  No scleral icterus  Neck: Normal range of motion  Neck supple  No JVD present  Cardiovascular: Normal rate, regular rhythm and normal heart sounds  Exam reveals no gallop and no friction rub  No murmur heard  Pulmonary/Chest: Effort normal and breath sounds normal  No respiratory distress  She has no wheezes   She has no rales    Abdominal: Soft  Bowel sounds are normal  She exhibits no distension  There is no tenderness  Musculoskeletal: Normal range of motion  She exhibits no edema or deformity  Neurological: She is alert and oriented to person, place, and time  No cranial nerve deficit  Skin: Skin is warm and dry  No rash noted  She is not diaphoretic  Psychiatric: She has a normal mood and affect  Her behavior is normal    Vitals reviewed  EKG:  Normal sinus rhythm  Normal ECG    Discussion/Summary:  PVCs: seems to be increasing in frequency a few months ago compared to before  Not currently on any medical therapy for this  Would start her on Magnesium oxide 400mg tabs up to twice a day to help suppress PVCs, if they become worse again along with reducing caffeine intake and increasing water intake with electrolytes

## 2018-07-17 ENCOUNTER — TELEPHONE (OUTPATIENT)
Dept: OBGYN CLINIC | Facility: CLINIC | Age: 43
End: 2018-07-17

## 2018-07-18 DIAGNOSIS — Z12.39 ENCOUNTER FOR SCREENING FOR MALIGNANT NEOPLASM OF BREAST: Primary | ICD-10-CM

## 2018-07-25 ENCOUNTER — HOSPITAL ENCOUNTER (OUTPATIENT)
Dept: MAMMOGRAPHY | Facility: HOSPITAL | Age: 43
Discharge: HOME | End: 2018-07-25
Attending: OBSTETRICS & GYNECOLOGY
Payer: COMMERCIAL

## 2018-07-25 DIAGNOSIS — Z12.39 ENCOUNTER FOR SCREENING FOR MALIGNANT NEOPLASM OF BREAST: ICD-10-CM

## 2018-07-25 PROCEDURE — 77063 BREAST TOMOSYNTHESIS BI: CPT

## 2018-07-25 PROCEDURE — 77067 SCR MAMMO BI INCL CAD: CPT

## 2018-08-23 ENCOUNTER — ANNUAL EXAM (OUTPATIENT)
Dept: OBGYN CLINIC | Facility: CLINIC | Age: 43
End: 2018-08-23
Payer: COMMERCIAL

## 2018-08-23 VITALS
BODY MASS INDEX: 19.49 KG/M2 | DIASTOLIC BLOOD PRESSURE: 64 MMHG | WEIGHT: 110 LBS | SYSTOLIC BLOOD PRESSURE: 108 MMHG | HEIGHT: 63 IN

## 2018-08-23 DIAGNOSIS — Z01.419 WELL WOMAN EXAM WITH ROUTINE GYNECOLOGICAL EXAM: ICD-10-CM

## 2018-08-23 DIAGNOSIS — Z12.31 ENCOUNTER FOR SCREENING MAMMOGRAM FOR MALIGNANT NEOPLASM OF BREAST: Primary | ICD-10-CM

## 2018-08-23 PROCEDURE — 99396 PREV VISIT EST AGE 40-64: CPT | Performed by: OBSTETRICS & GYNECOLOGY

## 2018-08-23 NOTE — PROGRESS NOTES
ASSESSMENT & PLAN: Agatha Ang is a 37 y o  [id number] with normal gynecologic exam     1   Routine well woman exam done today  2  Pap and HPV:  The patient's last pap and hpv were negative in 2017  Pap and cotesting was not done today  Current ASCCP Guidelines reviewed  Repeat in   3  Mammogram up to date  Needs 3-d mammo next year  Order placed  4  The following were reviewed in today's visit: breast self exam, family planning choices, adequate intake of calcium and vitamin D, exercise and healthy diet  5  Colonoscopy: discussed with patient  Will consider it    CC:  Annual Gynecologic Examination    HPI: Agatha Ang is a 37 y o  [id number] who presents for annual gynecologic examination  She has the following concerns:  none    Health Maintenance:    She exercises 6 days per week with walk  She wears her seatbelt routinely  She does not perform regular monthly self breast exams  She feels safe at home  Patients does not follow a particular diet  Her last pap was    Last mammogram:     History reviewed  No pertinent past medical history  History reviewed  No pertinent surgical history  Past OB/Gyn History:  OB History      Para Term  AB Living    2 2 2     2    SAB TAB Ectopic Multiple Live Births            2           Patient's last menstrual period was 2018 (exact date)  Period Cycle (Days): 28  Period Duration (Days): 5-7  Period Pattern: Regular  Menstrual Flow: Moderate  Dysmenorrhea: None   History of sexually transmitted infection No  History of abnormal pap smears  No     Patient is not currently sexually active  heterosexual Birth control: none      Family History   Problem Relation Age of Onset    Osteoarthritis Mother     Hyperlipidemia Mother     No Known Problems Father     Asthma Brother     Heart attack Maternal Grandfather 48    Hypertension Neg Hx     Coronary artery disease Neg Hx     Diabetes Neg Hx     Stroke Neg Hx  Cancer Neg Hx     Sudden death Neg Hx         SCD       Social History:  Social History     Social History    Marital status: /Civil Union     Spouse name: N/A    Number of children: N/A    Years of education: N/A     Occupational History    Not on file  Social History Main Topics    Smoking status: Never Smoker    Smokeless tobacco: Never Used    Alcohol use No    Drug use: No    Sexual activity: Not Currently     Partners: Male     Other Topics Concern    Not on file     Social History Narrative    Exercise habits:         Presently lives with  and two girls  Patient is   Patient is currently employed [employer]  No Known Allergies    Current Outpatient Prescriptions:     tretinoin (RETIN-A) 0 05 % cream, Apply topically, Disp: , Rfl:     Review of Systems:  A complete review of systems was performed and was negative, except as listed  none    Physical Exam:  /64   Ht 5' 2 5" (1 588 m)   Wt 49 9 kg (110 lb)   LMP 08/09/2018 (Exact Date)   Breastfeeding? No   BMI 19 80 kg/m²    GEN: The patient was alert and oriented x3, pleasant well-appearing female in no acute distress  HEENT:  Unremarkable, no anterior or posterior lymphadenopathy, no thyromegaly  CV:  RRR, no murmurs  RESP:  Clear to auscultation bilaterally  BREAST:  Symmetric breasts with no palpable breast masses or obvious breast lesions  She has no retractions or nipple discharge  She has no axillary abnormalities or palpable masses  Self breast exam is taught  ABD:  Soft, nontender, nondistended, normoactive bowel sounds,   EXT:  WWP, nontender, no edema  BACK:  No CVA tenderness, no tenderness to palpation along spine  PELVIC:  Normal appearing external female genitalia, normal vaginal epithelium, No discharge  Cervix present   Bimanual: absent CMT,  normal uterus, non-tender  No palpable adnexal masses

## 2018-10-03 ENCOUNTER — COSMETIC (OUTPATIENT)
Dept: PLASTIC SURGERY | Facility: CLINIC | Age: 43
End: 2018-10-03

## 2018-10-03 VITALS — HEIGHT: 63 IN | WEIGHT: 110 LBS | BODY MASS INDEX: 19.49 KG/M2

## 2018-10-03 DIAGNOSIS — Z92.29 S/P BOTOX INJECTION: ICD-10-CM

## 2018-10-03 PROCEDURE — BOTOX1U PR BOTOX BY THE UNIT: Performed by: PLASTIC SURGERY

## 2018-10-09 ENCOUNTER — OFFICE VISIT (OUTPATIENT)
Dept: FAMILY MEDICINE CLINIC | Facility: CLINIC | Age: 43
End: 2018-10-09
Payer: COMMERCIAL

## 2018-10-09 VITALS
HEART RATE: 84 BPM | TEMPERATURE: 98.2 F | HEIGHT: 63 IN | SYSTOLIC BLOOD PRESSURE: 112 MMHG | OXYGEN SATURATION: 99 % | RESPIRATION RATE: 16 BRPM | BODY MASS INDEX: 19.84 KG/M2 | DIASTOLIC BLOOD PRESSURE: 70 MMHG | WEIGHT: 112 LBS

## 2018-10-09 DIAGNOSIS — R59.1 LYMPHADENOPATHY OF HEAD AND NECK: Primary | ICD-10-CM

## 2018-10-09 PROCEDURE — 99213 OFFICE O/P EST LOW 20 MIN: CPT | Performed by: FAMILY MEDICINE

## 2018-10-09 NOTE — ASSESSMENT & PLAN NOTE
Lymph node palpable which is from on the left side of the neck in the occipital area, discussed with her that get the CBC and then she will get a ultrasound of the area and then follow up in about 2 weeks

## 2018-10-09 NOTE — PROGRESS NOTES
Assessment/Plan:    Problem List Items Addressed This Visit        Immune and Lymphatic    Lymphadenopathy of head and neck - Primary     Lymph node palpable which is from on the left side of the neck in the occipital area, discussed with her that get the CBC and then she will get a ultrasound of the area and then follow up in about 2 weeks         Relevant Orders    US head neck soft tissue    CBC and differential          No chief complaint on file  Lump on the back of the left side of the neck    Subjective:   Patient ID: Gail Smith is a 37 y o  female  She says that she has a lump in the left side of her back of the neck for a last 1 month and is not resolving, she denies any sinus infection denies any fever chills denies any weight loss denies any family history of cancer, no recent infections  And this lump is not painful is not growing in size and not regressing, 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,        Review of Systems   Constitutional: Negative for activity change, appetite change, chills, diaphoresis, fatigue, fever and unexpected weight change  HENT: Negative for congestion, dental problem, ear discharge, ear pain, facial swelling, hearing loss, mouth sores, nosebleeds, postnasal drip, rhinorrhea, sinus pain, sinus pressure, sneezing, sore throat, trouble swallowing and voice change  Eyes: Negative for photophobia, pain, discharge, redness and itching  Respiratory: Negative for cough, chest tightness, shortness of breath and wheezing  Cardiovascular: Negative for chest pain, palpitations and leg swelling  Gastrointestinal: Negative for abdominal distention, abdominal pain, blood in stool, constipation, diarrhea and nausea  Endocrine: Negative for cold intolerance, heat intolerance, polydipsia, polyphagia and polyuria  Genitourinary: Negative for dysuria, flank pain, frequency, hematuria and urgency     Musculoskeletal: Negative for arthralgias, back pain, myalgias and neck pain  Skin: Negative for color change and pallor  Lump in the back of the neck on the left side   Allergic/Immunologic: Negative for environmental allergies and food allergies  Neurological: Negative for dizziness, weakness, light-headedness, numbness and headaches  Hematological: Negative for adenopathy  Does not bruise/bleed easily  Psychiatric/Behavioral: Negative for behavioral problems, sleep disturbance and suicidal ideas  The patient is not nervous/anxious  Objective:  Physical Exam   Constitutional: She is oriented to person, place, and time  She appears well-developed and well-nourished  HENT:   Head: Normocephalic and atraumatic  Nose: Nose normal    Mouth/Throat: Oropharynx is clear and moist  No oropharyngeal exudate  Eyes: Conjunctivae and EOM are normal  Right eye exhibits no discharge  Left eye exhibits no discharge  No scleral icterus  Neck: Normal range of motion  Neck supple  No JVD present  No tracheal deviation present  No thyromegaly present  Cardiovascular: Normal rate, regular rhythm and normal heart sounds  No murmur heard  Pulmonary/Chest: Effort normal and breath sounds normal  No respiratory distress  She has no wheezes  She has no rales  Abdominal: Soft  Bowel sounds are normal  She exhibits no distension and no mass  There is no tenderness  There is no rebound  Musculoskeletal: Normal range of motion  She exhibits no edema  Lymphadenopathy:     She has no cervical adenopathy  On the back of the left side of the neck occipital lymph node is enlarged and form, nontender   Neurological: She is alert and oriented to person, place, and time  She has normal reflexes  No cranial nerve deficit  Skin: Skin is warm  No rash noted  No erythema  No pallor  Psychiatric: She has a normal mood and affect  Her behavior is normal  Judgment and thought content normal    Nursing note and vitals reviewed           No past surgical history on file     Family History   Problem Relation Age of Onset    Osteoarthritis Mother     Hyperlipidemia Mother     No Known Problems Father     Asthma Brother     Heart attack Maternal Grandfather 48    Hypertension Neg Hx     Coronary artery disease Neg Hx     Diabetes Neg Hx     Stroke Neg Hx     Cancer Neg Hx     Sudden death Neg Hx         SCD       No current outpatient prescriptions on file      No Known Allergies    Vitals:    10/09/18 1852   BP: 112/70   Pulse: 84   Resp: 16   Temp: 98 2 °F (36 8 °C)   SpO2: 99%   Weight: 50 8 kg (112 lb)   Height: 5' 3" (1 6 m)

## 2018-10-10 ENCOUNTER — APPOINTMENT (OUTPATIENT)
Dept: LAB | Facility: AMBULARY SURGERY CENTER | Age: 43
End: 2018-10-10
Payer: COMMERCIAL

## 2018-10-10 DIAGNOSIS — R59.1 LYMPHADENOPATHY OF HEAD AND NECK: ICD-10-CM

## 2018-10-10 LAB
BASOPHILS # BLD AUTO: 0.06 THOUSANDS/ΜL (ref 0–0.1)
BASOPHILS NFR BLD AUTO: 1 % (ref 0–1)
EOSINOPHIL # BLD AUTO: 0.2 THOUSAND/ΜL (ref 0–0.61)
EOSINOPHIL NFR BLD AUTO: 4 % (ref 0–6)
ERYTHROCYTE [DISTWIDTH] IN BLOOD BY AUTOMATED COUNT: 11.9 % (ref 11.6–15.1)
HCT VFR BLD AUTO: 39.2 % (ref 34.8–46.1)
HGB BLD-MCNC: 12.7 G/DL (ref 11.5–15.4)
IMM GRANULOCYTES # BLD AUTO: 0.01 THOUSAND/UL (ref 0–0.2)
IMM GRANULOCYTES NFR BLD AUTO: 0 % (ref 0–2)
LYMPHOCYTES # BLD AUTO: 1.18 THOUSANDS/ΜL (ref 0.6–4.47)
LYMPHOCYTES NFR BLD AUTO: 25 % (ref 14–44)
MCH RBC QN AUTO: 31.6 PG (ref 26.8–34.3)
MCHC RBC AUTO-ENTMCNC: 32.4 G/DL (ref 31.4–37.4)
MCV RBC AUTO: 98 FL (ref 82–98)
MONOCYTES # BLD AUTO: 0.55 THOUSAND/ΜL (ref 0.17–1.22)
MONOCYTES NFR BLD AUTO: 12 % (ref 4–12)
NEUTROPHILS # BLD AUTO: 2.75 THOUSANDS/ΜL (ref 1.85–7.62)
NEUTS SEG NFR BLD AUTO: 58 % (ref 43–75)
NRBC BLD AUTO-RTO: 0 /100 WBCS
PLATELET # BLD AUTO: 248 THOUSANDS/UL (ref 149–390)
PMV BLD AUTO: 9.8 FL (ref 8.9–12.7)
RBC # BLD AUTO: 4.02 MILLION/UL (ref 3.81–5.12)
WBC # BLD AUTO: 4.75 THOUSAND/UL (ref 4.31–10.16)

## 2018-10-10 PROCEDURE — 85025 COMPLETE CBC W/AUTO DIFF WBC: CPT

## 2018-10-10 PROCEDURE — 36415 COLL VENOUS BLD VENIPUNCTURE: CPT

## 2018-10-11 ENCOUNTER — HOSPITAL ENCOUNTER (OUTPATIENT)
Dept: ULTRASOUND IMAGING | Facility: HOSPITAL | Age: 43
Discharge: HOME | End: 2018-10-11
Attending: FAMILY MEDICINE
Payer: COMMERCIAL

## 2018-10-11 DIAGNOSIS — R59.1 LYMPHADENOPATHY OF HEAD AND NECK: ICD-10-CM

## 2018-10-11 PROCEDURE — 76536 US EXAM OF HEAD AND NECK: CPT

## 2018-10-25 ENCOUNTER — OFFICE VISIT (OUTPATIENT)
Dept: FAMILY MEDICINE CLINIC | Facility: CLINIC | Age: 43
End: 2018-10-25
Payer: COMMERCIAL

## 2018-10-25 VITALS
SYSTOLIC BLOOD PRESSURE: 114 MMHG | HEIGHT: 63 IN | BODY MASS INDEX: 19.49 KG/M2 | WEIGHT: 110 LBS | OXYGEN SATURATION: 99 % | DIASTOLIC BLOOD PRESSURE: 76 MMHG | HEART RATE: 71 BPM | RESPIRATION RATE: 16 BRPM

## 2018-10-25 DIAGNOSIS — R59.1 LYMPHADENOPATHY OF HEAD AND NECK: Primary | ICD-10-CM

## 2018-10-25 PROCEDURE — 3008F BODY MASS INDEX DOCD: CPT | Performed by: FAMILY MEDICINE

## 2018-10-25 PROCEDURE — 1036F TOBACCO NON-USER: CPT | Performed by: FAMILY MEDICINE

## 2018-10-25 PROCEDURE — 99213 OFFICE O/P EST LOW 20 MIN: CPT | Performed by: FAMILY MEDICINE

## 2018-10-25 NOTE — ASSESSMENT & PLAN NOTE
single slightly enlarged lymph node in left side of neck which is normal looking on ultrasound of the head and neck, and CBC is normal  Really sure rinse given to her and checked all the lymph nodes and no other palpable lymph node, in the neck and axilla, no suspicious skin lesion or mole  Mammogram is up-to-date  F/u in 1 month

## 2018-10-25 NOTE — PROGRESS NOTES
Assessment/Plan:    Lymphadenopathy of head and neck   single slightly enlarged lymph node in left side of neck which is normal looking on ultrasound of the head and neck, and CBC is normal  Really sure rinse given to her and checked all the lymph nodes and no other palpable lymph node, in the neck and axilla, no suspicious skin lesion or mole  Mammogram is up-to-date  F/u in 1 month       Diagnoses and all orders for this visit:    Lymphadenopathy of head and neck          Blood pressure 114/76, pulse 71, resp  rate 16, height 5' 3" (1 6 m), weight 49 9 kg (110 lb), SpO2 99 %, not currently breastfeeding  Subjective:      Patient ID: Francois Abad is a 37 y o  female  She is here follow-up on lymph node swelling in the left side of the neck, she says it has been there for almost a month no change, no enlargement no pain and she has no more lymphadenopathy in other places, she had a CBC and ultrasound of the neck done which shows a normal looking lymph now  She had her mammogram recently which was normal        The following portions of the patient's history were reviewed and updated as appropriate: allergies, current medications, past family history, past medical history and problem list     Review of Systems   Constitutional: Negative for activity change, appetite change, chills, diaphoresis, fatigue, fever and unexpected weight change  HENT: Negative for congestion, dental problem, ear discharge, ear pain, facial swelling, hearing loss, mouth sores, nosebleeds, postnasal drip, rhinorrhea, sinus pain, sinus pressure, sneezing, sore throat, trouble swallowing and voice change  Eyes: Negative for photophobia, pain, discharge, redness and itching  Respiratory: Negative for cough, chest tightness, shortness of breath and wheezing  Cardiovascular: Negative for chest pain, palpitations and leg swelling     Gastrointestinal: Negative for abdominal distention, abdominal pain, blood in stool, constipation, diarrhea and nausea  Endocrine: Negative for cold intolerance, heat intolerance, polydipsia, polyphagia and polyuria  Genitourinary: Negative for dysuria, flank pain, frequency, hematuria and urgency  Musculoskeletal: Negative for arthralgias, back pain, myalgias and neck pain  Skin: Negative for color change and pallor  Allergic/Immunologic: Negative for environmental allergies and food allergies  Neurological: Negative for dizziness, weakness, light-headedness, numbness and headaches  Hematological: Negative for adenopathy  Does not bruise/bleed easily  Psychiatric/Behavioral: Negative for behavioral problems, sleep disturbance and suicidal ideas  The patient is not nervous/anxious  Objective:     Physical Exam   Constitutional: She appears well-developed and well-nourished  HENT:   Head: Atraumatic  Mouth/Throat: No oropharyngeal exudate  Eyes: Conjunctivae are normal    Neck: Normal range of motion  Neck supple  Cardiovascular: Normal rate, regular rhythm and normal heart sounds  No murmur heard  Pulmonary/Chest: Effort normal and breath sounds normal    Abdominal: Soft  Bowel sounds are normal  She exhibits no distension and no mass  There is no tenderness  There is no rebound  Musculoskeletal: Normal range of motion  She exhibits no edema or deformity  Lymphadenopathy:   A palpable lymph node in the left side of the neck which is nontender   Neurological: She is alert  Skin: No erythema  Nursing note and vitals reviewed

## 2018-10-31 ENCOUNTER — COSMETIC (OUTPATIENT)
Dept: PLASTIC SURGERY | Facility: CLINIC | Age: 43
End: 2018-10-31

## 2018-10-31 DIAGNOSIS — Z09 AFTERCARE INVOLVING THE USE OF PLASTIC SURGERY: Primary | ICD-10-CM

## 2018-10-31 PROCEDURE — RECHECK: Performed by: PLASTIC SURGERY

## 2018-12-05 NOTE — PROGRESS NOTES
Christine Erps is 2 weeks post-op: Botox injection  Presenting for routine follow-up  S:  Doing well  Patient has noted significant improvement in periorbital rhytids  O:  No edema, improvement in crews feet and infraorbital rhytids     A:  Satisfactory course  P:Patient to return in 3 months  Patient is to return as needed for redness, swelling, discomfort, or any concern about her surgery

## 2019-09-16 ENCOUNTER — OFFICE VISIT (OUTPATIENT)
Dept: CARDIOLOGY CLINIC | Facility: CLINIC | Age: 44
End: 2019-09-16
Payer: COMMERCIAL

## 2019-09-16 VITALS
WEIGHT: 105 LBS | OXYGEN SATURATION: 99 % | DIASTOLIC BLOOD PRESSURE: 56 MMHG | HEART RATE: 74 BPM | BODY MASS INDEX: 18.61 KG/M2 | HEIGHT: 63 IN | SYSTOLIC BLOOD PRESSURE: 90 MMHG

## 2019-09-16 DIAGNOSIS — Z82.49 FAMILY HISTORY OF ISCHEMIC HEART DISEASE (IHD): ICD-10-CM

## 2019-09-16 DIAGNOSIS — I49.3 PVC (PREMATURE VENTRICULAR CONTRACTION): Primary | ICD-10-CM

## 2019-09-16 PROCEDURE — 99214 OFFICE O/P EST MOD 30 MIN: CPT | Performed by: INTERNAL MEDICINE

## 2019-09-16 NOTE — PROGRESS NOTES
Cardiology Consultation     Agatha Ang  [id number]  1975  [place] [place] [facility] [facility] [address] [address]    1  PVC (premature ventricular contraction)     2  Family history of ischemic heart disease (IHD)       Chief Complaint   Patient presents with    Follow-up     1 yr    Palpitations     every now and then      HPI: Patient feels well, without complaints other than very mild palpitations, occasionally - not too bothersome  No reported chest pain, shortness of breath, lightheadedness, syncope, LE edema, orthopnea, PND, or significant weight changes  Patient remains active without any increased fatigue out of the ordinary  Patient Active Problem List   Diagnosis    Vitamin D deficiency    Family history of ischemic heart disease (IHD)    Diffuse cystic mastopathy    Current moderate episode of major depressive disorder without prior episode ([facility] )    PVC (premature ventricular contraction)    Primary insomnia    Elevated TSH    Lymphadenopathy of head and neck     History reviewed  No pertinent past medical history    Social History     Socioeconomic History    Marital status: /Civil Union     Spouse name: Not on file    Number of children: Not on file    Years of education: Not on file    Highest education level: Not on file   Occupational History    Not on file   Social Needs    Financial resource strain: Not on file    Food insecurity:     Worry: Not on file     Inability: Not on file    Transportation needs:     Medical: Not on file     Non-medical: Not on file   Tobacco Use    Smoking status: Never Smoker    Smokeless tobacco: Never Used   Substance and Sexual Activity    Alcohol use: No    Drug use: No    Sexual activity: Not Currently     Partners: Male   Lifestyle    Physical activity:     Days per week: Not on file     Minutes per session: Not on file    Stress: Not on file   Relationships    Social connections:     Talks on phone: Not on file     Gets together: Not on file     Attends Mandaeism service: Not on file     Active member of club or organization: Not on file     Attends meetings of clubs or organizations: Not on file     Relationship status: Not on file    Intimate partner violence:     Fear of current or ex partner: Not on file     Emotionally abused: Not on file     Physically abused: Not on file     Forced sexual activity: Not on file   Other Topics Concern    Not on file   Social History Narrative    Exercise habits:      Family History   Problem Relation Age of Onset    Osteoarthritis Mother     Hyperlipidemia Mother     No Known Problems Father     Asthma Brother     Heart attack Maternal Grandfather 48    Hypertension Neg Hx     Coronary artery disease Neg Hx     Diabetes Neg Hx     Stroke Neg Hx     Cancer Neg Hx     Sudden death Neg Hx         SCD     History reviewed  No pertinent surgical history  No current outpatient medications on file  No Known Allergies  Vitals:    09/16/19 1308   BP: 90/56   BP Location: Right arm   Patient Position: Sitting   Cuff Size: Standard   Pulse: 74   SpO2: 99%   Weight: 47 6 kg (105 lb)   Height: 5' 3" (1 6 m)       Labs:  No visits with results within 6 Month(s) from this visit     Latest known visit with results is:   Appointment on 10/10/2018   Component Date Value    WBC 10/10/2018 4 75     RBC 10/10/2018 4 02     Hemoglobin 10/10/2018 12 7     Hematocrit 10/10/2018 39 2     MCV 10/10/2018 98     MCH 10/10/2018 31 6     MCHC 10/10/2018 32 4     RDW 10/10/2018 11 9     MPV 10/10/2018 9 8     Platelets 11/15/6922 248     nRBC 10/10/2018 0     Neutrophils Relative 10/10/2018 58     Immat GRANS % 10/10/2018 0     Lymphocytes Relative 10/10/2018 25     Monocytes Relative 10/10/2018 12     Eosinophils Relative 10/10/2018 4     Basophils Relative 10/10/2018 1     Neutrophils Absolute 10/10/2018 2 75     Immature Grans Absolute 10/10/2018 0 01     Lymphocytes Absolute 10/10/2018 1 18     Monocytes Absolute 10/10/2018 0 55     Eosinophils Absolute 10/10/2018 0 20     Basophils Absolute 10/10/2018 0 06      Lab Results   Component Value Date    TRIG 64 06/29/2018    HDL 70 (H) 06/29/2018     Imaging: No results found  Review of Systems:  Review of Systems   Constitutional: Negative for activity change, appetite change, chills, diaphoresis, fatigue and unexpected weight change  HENT: Negative for hearing loss, nosebleeds and sore throat  Eyes: Negative for photophobia and visual disturbance  Respiratory: Negative for cough, chest tightness, shortness of breath and wheezing  Cardiovascular: Negative for chest pain, palpitations and leg swelling  Gastrointestinal: Negative for abdominal pain, diarrhea, nausea and vomiting  Endocrine: Negative for polyuria  Genitourinary: Negative for dysuria, frequency and hematuria  Musculoskeletal: Negative for arthralgias, back pain, gait problem and neck pain  Skin: Negative for pallor and rash  Neurological: Negative for dizziness, syncope and headaches  Hematological: Does not bruise/bleed easily  Psychiatric/Behavioral: Negative for behavioral problems and confusion  Physical Exam:  Physical Exam   Constitutional: She is oriented to person, place, and time  She appears well-developed and well-nourished  HENT:   Head: Normocephalic and atraumatic  Nose: Nose normal    Eyes: Pupils are equal, round, and reactive to light  EOM are normal  No scleral icterus  Neck: Normal range of motion  Neck supple  No JVD present  Cardiovascular: Normal rate, regular rhythm and normal heart sounds  Exam reveals no gallop and no friction rub  No murmur heard  Pulmonary/Chest: Effort normal and breath sounds normal  No respiratory distress  She has no wheezes  She has no rales  Abdominal: Soft   Bowel sounds are normal  She exhibits no distension  There is no tenderness  Musculoskeletal: Normal range of motion  She exhibits no edema or deformity  Neurological: She is alert and oriented to person, place, and time  No cranial nerve deficit  Skin: Skin is warm and dry  No rash noted  She is not diaphoretic  Psychiatric: She has a normal mood and affect  Her behavior is normal    Vitals reviewed  Blood pressure 90/56, pulse 74, height 5' 3" (1 6 m), weight 47 6 kg (105 lb), SpO2 99 %, not currently breastfeeding  EKG:  Normal sinus rhythm  Normal ECG    Discussion/Summary:  PVCs: seems to be well controlled currently  Not currently on any medical therapy for this  Would continue her on Magnesium oxide 400mg tabs up to twice a day to help suppress PVCs, if they become worse again along with reducing caffeine intake and increasing water intake with electrolytes  Will recheck echo to make sure there are no effects on her LV morphology

## 2019-09-16 NOTE — PATIENT INSTRUCTIONS
Premature Ventricular Contractions   WHAT YOU NEED TO KNOW:   What are premature ventricular contractions? Premature ventricular contractions (PVCs) are an interruption in your heart rhythm  They are caused by an early signal for your heart to pump  Your risk of PVCs increases when you drink alcohol or caffeine, or if you are fatigued or stressed  It is very important for you to follow up with your healthcare provider so the cause of your PVC can be diagnosed and treated  What are symptoms of PVCs? · A skipped heartbeat     · A fast heartbeat    · Chest pain     · Lightheadedness, dizziness, or faintness    · Tiredness with exercise or activity  How are PVCs diagnosed? Your healthcare provider will ask if you have a family history of heart problems  You may also need one or more of the following:  · An EKG  records your heart rhythm and how fast your heart beats  It is used to check for PVCs  · A Holter monitor  is a device that you wear for a period of time  It records how fast your heart beats, and if it beats in a regular pattern  You may need to wear it up to 72 hours  This will show how frequent your PVCs are during your normal daily activities  · An echocardiogram  (echo) is a type of ultrasound that shows the movement and blood vessels of your heart on a monitor  How are PVCs treated? Your treatment will depend on the cause of your PVC  You may need any of the following:  · Heart medicine  may be given to make your heart beat at a regular rate and rhythm  · Cardiac ablation  is a procedure that is used to treat an abnormal heart rhythm  Ask your healthcare provider for more information  When should I contact my healthcare provider? · You still have symptoms after treatment, or your symptoms worsen  · You have questions or concerns about your condition or care  When should I seek immediate care or call 911?    · You have any of the following signs of a heart attack:      ¨ Squeezing, pressure, or pain in your chest that lasts longer than 5 minutes or returns    ¨ Discomfort or pain in your back, neck, jaw, stomach, or arm     ¨ Trouble breathing    ¨ Nausea or vomiting    ¨ Lightheadedness or a sudden cold sweat, especially with chest pain or trouble breathing    CARE AGREEMENT:   You have the right to help plan your care  Learn about your health condition and how it may be treated  Discuss treatment options with your caregivers to decide what care you want to receive  You always have the right to refuse treatment  The above information is an  only  It is not intended as medical advice for individual conditions or treatments  Talk to your doctor, nurse or pharmacist before following any medical regimen to see if it is safe and effective for you  © 2017 [facility] [place] Information is for End User's use only and may not be sold, redistributed or otherwise used for commercial purposes  All illustrations and images included in CareNotes® are the copyrighted property of [employer]  or Jamie Perera

## 2019-10-09 ENCOUNTER — ANNUAL EXAM (OUTPATIENT)
Dept: OBGYN CLINIC | Facility: CLINIC | Age: 44
End: 2019-10-09
Payer: COMMERCIAL

## 2019-10-09 VITALS
DIASTOLIC BLOOD PRESSURE: 58 MMHG | BODY MASS INDEX: 18.78 KG/M2 | HEIGHT: 63 IN | WEIGHT: 106 LBS | SYSTOLIC BLOOD PRESSURE: 98 MMHG

## 2019-10-09 DIAGNOSIS — Z01.419 WOMEN'S ANNUAL ROUTINE GYNECOLOGICAL EXAMINATION: Primary | ICD-10-CM

## 2019-10-09 DIAGNOSIS — Z12.31 ENCOUNTER FOR SCREENING MAMMOGRAM FOR MALIGNANT NEOPLASM OF BREAST: ICD-10-CM

## 2019-10-09 DIAGNOSIS — N39.3 SUI (STRESS URINARY INCONTINENCE, FEMALE): ICD-10-CM

## 2019-10-09 DIAGNOSIS — G47.00 INSOMNIA, UNSPECIFIED TYPE: ICD-10-CM

## 2019-10-09 DIAGNOSIS — R92.2 DENSE BREASTS: ICD-10-CM

## 2019-10-09 PROCEDURE — 99396 PREV VISIT EST AGE 40-64: CPT | Performed by: OBSTETRICS & GYNECOLOGY

## 2019-10-09 NOTE — PROGRESS NOTES
ASSESSMENT & PLAN:     Diagnoses and all orders for this visit:    Women's annual routine gynecological examination   - pap due next year  Encounter for screening mammogram for malignant neoplasm of breast  Dense breasts  -     Mammo screening bilateral w 3d & cad; Future    NICHOLE (stress urinary incontinence, female)   - discussed sissy  Offered referral to urogyn, patient declined  Insomnia, unspecified type   - sleep hygeine, melatonin consistently  -offered trial of OCPs, patient declines  The following were reviewed in today's visit: ASCCP guidelines, yearly mammography, breast self exam, mammography screening ordered, exercise and healthy diet  Patient to return to office yearly for annual exam      All questions have been answered to her satisfaction  CC:  Annual Gynecologic Examination    HPI: Lonnie Valentin is a 53 y o  [id number] who presents for annual gynecologic examination  She has the following concerns:  Incontinence with exercise  Not all the time  Not correlated with bladder fullness  Insomnia for a week before her cycle  Discussed sleep habits  Offered birth control pills  Health Maintenance:    She exercises 7 days per week  She wears her seatbelt routinely  She does not perform regular monthly self breast exams  She feels safe at home  Patient does try  follow a balanced diet  Last mammogram: 7/2018    History reviewed  No pertinent past medical history  History reviewed  No pertinent surgical history  Past OB/Gyn History:  Period Cycle (Days): 30  Period Duration (Days): 6-7  Period Pattern: Regular  Menstrual Flow: Moderate  Dysmenorrhea: (!) Mild  Dysmenorrhea Symptoms: HeadachePatient's last menstrual period was 09/18/2019  History of sexually transmitted infection: No  Patient is not currently sexually active    Birth control: abstinence  Last Pap  2017 :  no abnormalities; HPV negative    Family History:  Family History   Problem Relation Age of Onset    Osteoarthritis Mother     Hyperlipidemia Mother     No Known Problems Father     Asthma Brother     Heart attack Maternal Grandfather 48    Vaginal cancer Maternal Aunt     Hypertension Neg Hx     Coronary artery disease Neg Hx     Diabetes Neg Hx     Stroke Neg Hx     Cancer Neg Hx     Sudden death Neg Hx         SCD       Social History:  Social History     Socioeconomic History    Marital status: /Civil Union     Spouse name: Not on file    Number of children: Not on file    Years of education: Not on file    Highest education level: Not on file   Occupational History    Not on file   Social Needs    Financial resource strain: Not on file    Food insecurity:     Worry: Not on file     Inability: Not on file    Transportation needs:     Medical: Not on file     Non-medical: Not on file   Tobacco Use    Smoking status: Never Smoker    Smokeless tobacco: Never Used   Substance and Sexual Activity    Alcohol use: No    Drug use: No    Sexual activity: Not Currently     Partners: Male   Lifestyle    Physical activity:     Days per week: Not on file     Minutes per session: Not on file    Stress: Not on file   Relationships    Social connections:     Talks on phone: Not on file     Gets together: Not on file     Attends Yarsanism service: Not on file     Active member of club or organization: Not on file     Attends meetings of clubs or organizations: Not on file     Relationship status: Not on file    Intimate partner violence:     Fear of current or ex partner: Not on file     Emotionally abused: Not on file     Physically abused: Not on file     Forced sexual activity: Not on file   Other Topics Concern    Not on file   Social History Narrative    Exercise habits:     Presently lives with , children  Patient is   Patient is currently employed  Allergies:  No Known Allergies    Medications:  No current outpatient medications on file      Review of Systems:  Review of Systems   Constitutional: Negative for unexpected weight change  Respiratory: Negative for shortness of breath  Cardiovascular: Negative for chest pain  Gastrointestinal: Negative for abdominal distention, abdominal pain, blood in stool, constipation, nausea and vomiting  Genitourinary: Positive for menstrual problem (insomnia a week before)  Negative for difficulty urinating, dysuria, frequency, pelvic pain, vaginal bleeding, vaginal discharge and vaginal pain  Neurological: Negative for headaches (hormonal headaches)  Psychiatric/Behavioral: Positive for sleep disturbance  Physical Exam:  BP 98/58   Ht 5' 3" (1 6 m)   Wt 48 1 kg (106 lb)   LMP 09/18/2019   BMI 18 78 kg/m²      Physical Exam   Constitutional: She is oriented to person, place, and time  Vital signs are normal  She appears well-developed and well-nourished  Genitourinary: Vagina normal and uterus normal  Pelvic exam was performed with patient supine  There is no rash, tenderness or lesion on the right labia  There is no rash, tenderness or lesion on the left labia  Vagina exhibits rugosity  No erythema, tenderness or bleeding in the vagina  No vaginal discharge found  Right adnexum does not display mass, does not display tenderness and does not display fullness  Left adnexum does not display mass, does not display tenderness and does not display fullness  Cervix is parous  Cervix exhibits pinkness  Cervix does not exhibit motion tenderness, lesion, discharge or polyp  Uterus is anteverted  Uterus is not enlarged, tender or irregular (is regular)  Genitourinary Comments: No bladder tenderness     HENT:   Head: Normocephalic  Neck: No thyromegaly present  Cardiovascular: Normal rate, regular rhythm and normal heart sounds  Pulmonary/Chest: Effort normal  Right breast exhibits no inverted nipple, no mass, no nipple discharge, no skin change and no tenderness   Left breast exhibits no inverted nipple, no mass, no nipple discharge, no skin change and no tenderness  Abdominal: Soft  She exhibits no distension  There is no tenderness  Neurological: She is alert and oriented to person, place, and time  Psychiatric: She has a normal mood and affect  Her behavior is normal    Vitals reviewed

## 2020-01-06 ENCOUNTER — HOSPITAL ENCOUNTER (OUTPATIENT)
Dept: NON INVASIVE DIAGNOSTICS | Facility: CLINIC | Age: 45
Discharge: HOME | End: 2020-01-06
Payer: COMMERCIAL

## 2020-01-06 DIAGNOSIS — I49.3 PVC (PREMATURE VENTRICULAR CONTRACTION): ICD-10-CM

## 2020-01-06 PROCEDURE — 93306 TTE W/DOPPLER COMPLETE: CPT

## 2020-01-06 PROCEDURE — 93306 TTE W/DOPPLER COMPLETE: CPT | Performed by: INTERNAL MEDICINE

## 2020-01-13 ENCOUNTER — OFFICE VISIT (OUTPATIENT)
Dept: FAMILY MEDICINE CLINIC | Facility: CLINIC | Age: 45
End: 2020-01-13
Payer: COMMERCIAL

## 2020-01-13 VITALS
HEART RATE: 73 BPM | DIASTOLIC BLOOD PRESSURE: 74 MMHG | RESPIRATION RATE: 16 BRPM | OXYGEN SATURATION: 100 % | WEIGHT: 105.3 LBS | BODY MASS INDEX: 18.66 KG/M2 | HEIGHT: 63 IN | TEMPERATURE: 97.8 F | SYSTOLIC BLOOD PRESSURE: 122 MMHG

## 2020-01-13 DIAGNOSIS — H92.12 OTORRHEA OF LEFT EAR: Primary | ICD-10-CM

## 2020-01-13 PROCEDURE — 3008F BODY MASS INDEX DOCD: CPT | Performed by: FAMILY MEDICINE

## 2020-01-13 PROCEDURE — 99213 OFFICE O/P EST LOW 20 MIN: CPT | Performed by: FAMILY MEDICINE

## 2020-01-13 RX ORDER — OFLOXACIN 3 MG/ML
10 SOLUTION AURICULAR (OTIC) DAILY
Qty: 5 ML | Refills: 0 | Status: SHIPPED | OUTPATIENT
Start: 2020-01-13 | End: 2020-02-06 | Stop reason: DRUGHIGH

## 2020-01-13 NOTE — PROGRESS NOTES
Assessment/Plan:       Problem List Items Addressed This Visit     None      Visit Diagnoses     Otorrhea of left ear    -  Primary    Relevant Medications    ofloxacin (FLOXIN) 0 3 % otic solution    Other Relevant Orders    Ambulatory Referral to Otolaryngology      Concern for otitis externa  rx floxin otic  Referral for ENT if sx not improving/worse  Advised to avoid using wax plugs for sleep while being treated- concern that the area is moist and needs to dry  ? Possible fungal etiology if not improving on floxin  Subjective:     Jonathon Mcnamara is a 53 y o  female here today with chief complaint below:  Chief Complaint   Patient presents with    Ear Fullness     Left ear, has been leaking since November  No inner ear pain just external       - CC above per clinical staff and reviewed  HPI:  Ear crusting, clear drainage from the L ear  A little pink on a qtip once  No vadim blood  No fever  No pain  Wears wax earplugs at night every night to help her sleep  Has had a swollen LN posterior auricular, had u/s  Still enlarged  No upper respiratory infection sx  The following portions of the patient's history were reviewed and updated as appropriate: allergies, current medications, past family history, past medical history, past social history, past surgical history and problem list     ROS:  Review of Systems   No fever, chills, congestion, pain  Objective:      /74   Pulse 73   Temp 97 8 °F (36 6 °C) (Tympanic)   Resp 16   Ht 5' 3" (1 6 m)   Wt 47 8 kg (105 lb 4 8 oz)   SpO2 100%   BMI 18 65 kg/m²   BP Readings from Last 3 Encounters:   01/13/20 122/74   10/09/19 98/58   09/16/19 90/56     Wt Readings from Last 3 Encounters:   01/13/20 47 8 kg (105 lb 4 8 oz)   10/09/19 48 1 kg (106 lb)   09/16/19 47 6 kg (105 lb)               Physical Exam:   Physical Exam   Constitutional: She is oriented to person, place, and time  She appears well-developed and well-nourished     HENT: Head: Normocephalic and atraumatic  Nose: Nose normal    Mouth/Throat: Oropharynx is clear and moist    L TM normal   L canal mild edema, diffuse yellow-white discharge  No blood  R TM and canal normal      Eyes: Conjunctivae are normal    Lymphadenopathy:     She has no cervical adenopathy  Neurological: She is alert and oriented to person, place, and time  Skin: Skin is warm and dry  Psychiatric: She has a normal mood and affect  Her behavior is normal    Nursing note and vitals reviewed

## 2020-01-22 DIAGNOSIS — Z01.10 ENCOUNTER FOR HEARING EXAMINATION, UNSPECIFIED WHETHER ABNORMAL FINDINGS: Primary | ICD-10-CM

## 2020-02-03 ENCOUNTER — OFFICE VISIT (OUTPATIENT)
Dept: OTOLARYNGOLOGY | Facility: CLINIC | Age: 45
End: 2020-02-03
Payer: COMMERCIAL

## 2020-02-03 VITALS
WEIGHT: 106.2 LBS | BODY MASS INDEX: 18.82 KG/M2 | DIASTOLIC BLOOD PRESSURE: 74 MMHG | HEIGHT: 63 IN | SYSTOLIC BLOOD PRESSURE: 112 MMHG

## 2020-02-03 DIAGNOSIS — M26.629 TMJ SYNDROME: ICD-10-CM

## 2020-02-03 DIAGNOSIS — H92.12 OTORRHEA OF LEFT EAR: ICD-10-CM

## 2020-02-03 DIAGNOSIS — H60.8X2 CHRONIC ECZEMATOUS OTITIS EXTERNA OF LEFT EAR: Primary | ICD-10-CM

## 2020-02-03 DIAGNOSIS — G47.9 RESTLESS SLEEPER: ICD-10-CM

## 2020-02-03 DIAGNOSIS — G47.19 EXCESSIVE DAYTIME SLEEPINESS: ICD-10-CM

## 2020-02-03 PROCEDURE — 3008F BODY MASS INDEX DOCD: CPT | Performed by: NURSE PRACTITIONER

## 2020-02-03 PROCEDURE — 99203 OFFICE O/P NEW LOW 30 MIN: CPT | Performed by: NURSE PRACTITIONER

## 2020-02-03 RX ORDER — CIPROFLOXACIN AND DEXAMETHASONE 3; 1 MG/ML; MG/ML
4 SUSPENSION/ DROPS AURICULAR (OTIC) 2 TIMES DAILY
Qty: 7.5 ML | Refills: 4 | Status: SHIPPED | OUTPATIENT
Start: 2020-02-03 | End: 2020-02-06 | Stop reason: DRUGHIGH

## 2020-02-03 RX ORDER — MOMETASONE FUROATE 1 MG/G
CREAM TOPICAL DAILY
Qty: 45 G | Refills: 4 | Status: SHIPPED | OUTPATIENT
Start: 2020-02-03 | End: 2021-02-09

## 2020-02-03 NOTE — PROGRESS NOTES
Assessment/Plan:    Chronic eczematous otitis externa of left ear  Left eac and wiliam bowl eith erythema and dry flaking skin, minimal white otorrhea, minimal cerumen removed with suction  Reviewed options including watchful monitoring, drops during episodes, use of oil based products to eac, hydrocortisone cream, and decreased q-tip usage  Discouraged use of ear plugs to left ear  Agreed recommendations and scripts sent to pharmacy  Follow up in 2 weeks to monitor  Excessive daytime sleepiness  Discussed morning headaches, poor sleep, restless sleep, and daytime sleepiness  Unsure of apnea or snoring due to lack witnessed episodes  Discussed option of obtaining home sleep study to further evaluate and agreed  Discuss results via phone      TMJ syndrome  Continue use of dental guard  Consider discussing with dentist upper vs lower guard  Diagnoses and all orders for this visit:    Chronic eczematous otitis externa of left ear  -     ciprofloxacin-dexamethasone (CIPRODEX) otic suspension; Administer 4 drops into the left ear 2 (two) times a day for 14 days  -     mometasone (ELOCON) 0 1 % cream; Apply topically daily    Otorrhea of left ear  -     Ambulatory Referral to Otolaryngology  -     ciprofloxacin-dexamethasone (CIPRODEX) otic suspension; Administer 4 drops into the left ear 2 (two) times a day for 14 days  -     mometasone (ELOCON) 0 1 % cream; Apply topically daily    Excessive daytime sleepiness  -     Home Study; Future    Restless sleeper  -     Home Study; Future    TMJ syndrome          Subjective:      Patient ID: Jyothi Ruiz is a 53 y o  female  Presents today as a new patient due to left ear infection  Wears ear plugs at night  Feels left ear leaking often  No change in hearing  PCP treated with ear drops  Pain along outer ear  Cleaned with peroxide  Not daily q-tip user  Poor sleeping and awakens often during night  Daytime sleepiness  No hypertension  Unsure of snoring  No witnessed apnea  Occasional morning headache   used for TMJ  No other ENT concerns  The following portions of the patient's history were reviewed and updated as appropriate: allergies, current medications, past family history, past medical history, past social history, past surgical history and problem list     Review of Systems   Constitutional: Positive for fatigue  HENT: Positive for ear discharge, ear pain and hearing loss  Negative for congestion, nosebleeds, postnasal drip, rhinorrhea, sinus pressure, sinus pain, sore throat, tinnitus and voice change  Eyes: Negative  Respiratory: Negative for chest tightness and shortness of breath  Cardiovascular: Negative  Gastrointestinal: Negative  Endocrine: Negative  Musculoskeletal: Negative  Skin: Negative for color change  Neurological: Negative for dizziness, numbness and headaches  Psychiatric/Behavioral: Negative  Objective:      /74 (BP Location: Left arm, Patient Position: Sitting, Cuff Size: Adult)   Ht 5' 3" (1 6 m)   Wt 48 2 kg (106 lb 3 2 oz)   BMI 18 81 kg/m²          Physical Exam   Constitutional: She is oriented to person, place, and time  She appears well-developed and well-nourished  She is cooperative  HENT:   Head: Normocephalic  Right Ear: Hearing, tympanic membrane, external ear and ear canal normal  No drainage or tenderness  Tympanic membrane is not perforated and not erythematous  No decreased hearing is noted  Left Ear: Hearing, tympanic membrane, external ear and ear canal normal  No drainage or tenderness  Tympanic membrane is not perforated and not erythematous  No decreased hearing is noted  Nose: Nose normal  No sinus tenderness, nasal deformity or septal deviation  Mouth/Throat: Uvula is midline, oropharynx is clear and moist and mucous membranes are normal  Mucous membranes are not pale and not dry  No oral lesions  Normal dentition   No oropharyngeal exudate  Evidence bruxism  Left eac with erythema, dry flaking skin  Minimal otorrhea     Neck: Normal range of motion and full passive range of motion without pain  Neck supple  No tracheal deviation present  Cardiovascular: Normal rate  Pulmonary/Chest: Effort normal  No accessory muscle usage  No respiratory distress  Musculoskeletal:        Right shoulder: She exhibits normal range of motion  Lymphadenopathy:     She has no cervical adenopathy  Neurological: She is alert and oriented to person, place, and time  No cranial nerve deficit or sensory deficit  Skin: Skin is warm, dry and intact  Psychiatric: She has a normal mood and affect

## 2020-02-03 NOTE — ASSESSMENT & PLAN NOTE
Discussed morning headaches, poor sleep, restless sleep, and daytime sleepiness  Unsure of apnea or snoring due to lack witnessed episodes  Discussed option of obtaining home sleep study to further evaluate and agreed    Discuss results via phone

## 2020-02-03 NOTE — ASSESSMENT & PLAN NOTE
Left eac and wiliam bowl eith erythema and dry flaking skin, minimal white otorrhea, minimal cerumen removed with suction  Reviewed options including watchful monitoring, drops during episodes, use of oil based products to eac, hydrocortisone cream, and decreased q-tip usage  Discouraged use of ear plugs to left ear  Agreed recommendations and scripts sent to pharmacy  Follow up in 2 weeks to monitor

## 2020-02-06 ENCOUNTER — TELEPHONE (OUTPATIENT)
Dept: OTOLARYNGOLOGY | Facility: CLINIC | Age: 45
End: 2020-02-06

## 2020-02-06 NOTE — TELEPHONE ENCOUNTER
Sawyer Martel called to ask if you could please send in the alternative to Ciprodex due to the expense of it     [phone]

## 2020-06-03 DIAGNOSIS — Z09 AFTERCARE INVOLVING THE USE OF PLASTIC SURGERY: Primary | ICD-10-CM

## 2020-06-03 DIAGNOSIS — Z09 AFTERCARE INVOLVING THE USE OF PLASTIC SURGERY: ICD-10-CM

## 2020-06-03 RX ORDER — TRETINOIN 0.5 MG/G
CREAM TOPICAL
Qty: 20 G | Refills: 2 | Status: SHIPPED | OUTPATIENT
Start: 2020-06-03 | End: 2020-06-03

## 2020-09-04 ENCOUNTER — COSMETIC (OUTPATIENT)
Dept: PLASTIC SURGERY | Facility: CLINIC | Age: 45
End: 2020-09-04

## 2020-09-04 VITALS — BODY MASS INDEX: 18.78 KG/M2 | TEMPERATURE: 99.6 F | HEIGHT: 63 IN | WEIGHT: 106 LBS

## 2020-09-04 DIAGNOSIS — Z41.1 ENCOUNTER FOR COSMETIC PROCEDURE: ICD-10-CM

## 2020-09-04 PROCEDURE — RECHECK: Performed by: PLASTIC SURGERY

## 2020-09-04 PROCEDURE — BOTOX1U PR BOTOX BY THE UNIT: Performed by: PLASTIC SURGERY

## 2020-09-04 NOTE — PROGRESS NOTES
Botulinum Toxin Injection Procedure Note    Procedure: Cosmetic botulinum toxin administration    Date of procedure: 9/4/2020    Pre-operative Diagnosis: Dynamic rhytides    Post-operative Diagnosis: Same    Surgeon: Madhavi Siu MD    Complications:  None    Brief history: Roosevelt Diaz desires botulinum toxin injection of her crew's feet area  I discussed with the patient this proposed procedure of botulinum toxin injections, which is customized depending on the particular needs of the patient  It is performed on facial rhytids as a temporary correction  The alternatives were discussed with the patient  The risks were addressed including bleeding, scarring, infection, damage to deeper structures, asymmetry, and chronic pain, which may occur infrequently after a procedure  The individual's choice to undergo a surgical procedure is based on the comparison of risks to potential benefits  Other risks include unsatisfactory results, brow ptosis, eyelid ptosis, allergic reaction, temporary paralysis, which should go away with time, bruising, blurring disturbances and delayed healing  Botulinum toxin injections do not arrest the aging process or produce permanent tightening of the eyelid  Operative intervention maybe necessary to maintain the results of a blepharoplasty or botulinum toxin  The patient understands and wishes to proceed  An informed consent was signed and informational brochures given to her prior to the procedure  Procedure: The area was prepped with alcohol and dried with a clean gauze  Using a clean technique, the botulinum toxin was diluted with 4 cc of preservative-free normal saline which was slowly injected with an 18 gauge needle in 1 cc tuberculin syringes  Subsequently 30 gauge needles were used to inject the botulinum toxin  This mixture allow for an aliquot of 2 5 units per 0 1 cc in each injection site  Bilateral Crew's feet 15 U    No complications were noted   Light pressure was held for 5 minutes  She was instructed explicitly in post-operative care

## 2020-10-19 ENCOUNTER — TELEPHONE (OUTPATIENT)
Dept: PLASTIC SURGERY | Facility: CLINIC | Age: 45
End: 2020-10-19

## 2020-11-18 DIAGNOSIS — Z09 AFTERCARE INVOLVING THE USE OF PLASTIC SURGERY: ICD-10-CM

## 2020-11-21 RX ORDER — TRETINOIN 0.5 MG/G
CREAM TOPICAL
Qty: 40 G | Refills: 2 | Status: SHIPPED | OUTPATIENT
Start: 2020-11-21 | End: 2020-12-21 | Stop reason: SDUPTHER

## 2020-12-21 DIAGNOSIS — Z09 AFTERCARE INVOLVING THE USE OF PLASTIC SURGERY: ICD-10-CM

## 2020-12-22 DIAGNOSIS — Z09 AFTERCARE INVOLVING THE USE OF PLASTIC SURGERY: ICD-10-CM

## 2020-12-22 RX ORDER — TRETINOIN 0.5 MG/G
CREAM TOPICAL
Qty: 40 G | Refills: 2 | Status: SHIPPED | OUTPATIENT
Start: 2020-12-22 | End: 2020-12-26

## 2020-12-26 RX ORDER — TRETINOIN 0.5 MG/G
CREAM TOPICAL
Qty: 40 G | Refills: 2 | Status: SHIPPED | OUTPATIENT
Start: 2020-12-26

## 2021-01-22 ENCOUNTER — TELEPHONE (OUTPATIENT)
Dept: DERMATOLOGY | Facility: CLINIC | Age: 46
End: 2021-01-22

## 2021-02-05 ENCOUNTER — IMMUNIZATIONS (OUTPATIENT)
Dept: FAMILY MEDICINE CLINIC | Facility: HOSPITAL | Age: 46
End: 2021-02-05

## 2021-02-05 DIAGNOSIS — Z23 ENCOUNTER FOR IMMUNIZATION: Primary | ICD-10-CM

## 2021-02-05 PROCEDURE — 91300 SARS-COV-2 / COVID-19 MRNA VACCINE (PFIZER-BIONTECH) 30 MCG: CPT

## 2021-02-05 PROCEDURE — 0001A SARS-COV-2 / COVID-19 MRNA VACCINE (PFIZER-BIONTECH) 30 MCG: CPT

## 2021-02-09 ENCOUNTER — OFFICE VISIT (OUTPATIENT)
Dept: FAMILY MEDICINE CLINIC | Facility: CLINIC | Age: 46
End: 2021-02-09
Payer: COMMERCIAL

## 2021-02-09 VITALS
OXYGEN SATURATION: 99 % | TEMPERATURE: 97.1 F | HEART RATE: 74 BPM | HEIGHT: 63 IN | RESPIRATION RATE: 18 BRPM | BODY MASS INDEX: 18.25 KG/M2 | WEIGHT: 103 LBS | DIASTOLIC BLOOD PRESSURE: 70 MMHG | SYSTOLIC BLOOD PRESSURE: 110 MMHG

## 2021-02-09 DIAGNOSIS — Z12.31 VISIT FOR SCREENING MAMMOGRAM: ICD-10-CM

## 2021-02-09 DIAGNOSIS — R82.90 CLOUDY URINE: Primary | ICD-10-CM

## 2021-02-09 DIAGNOSIS — M54.50 ACUTE BILATERAL LOW BACK PAIN WITHOUT SCIATICA: ICD-10-CM

## 2021-02-09 DIAGNOSIS — R22.1 LOCALIZED SWELLING, MASS AND LUMP, NECK: ICD-10-CM

## 2021-02-09 DIAGNOSIS — Z11.4 SCREENING FOR HIV WITHOUT PRESENCE OF RISK FACTORS: ICD-10-CM

## 2021-02-09 LAB
BACTERIA UR QL AUTO: ABNORMAL /HPF
BILIRUB UR QL STRIP: NEGATIVE
CLARITY UR: CLEAR
COLOR UR: YELLOW
GLUCOSE UR STRIP-MCNC: NEGATIVE MG/DL
HGB UR QL STRIP.AUTO: NEGATIVE
HYALINE CASTS #/AREA URNS LPF: ABNORMAL /LPF
KETONES UR STRIP-MCNC: NEGATIVE MG/DL
LEUKOCYTE ESTERASE UR QL STRIP: ABNORMAL
NITRITE UR QL STRIP: NEGATIVE
NON-SQ EPI CELLS URNS QL MICRO: ABNORMAL /HPF
PH UR STRIP.AUTO: 6.5 [PH]
PROT UR STRIP-MCNC: NEGATIVE MG/DL
RBC #/AREA URNS AUTO: ABNORMAL /HPF
SP GR UR STRIP.AUTO: 1.01 (ref 1–1.03)
UROBILINOGEN UR QL STRIP.AUTO: 0.2 E.U./DL
WBC #/AREA URNS AUTO: ABNORMAL /HPF

## 2021-02-09 PROCEDURE — 87086 URINE CULTURE/COLONY COUNT: CPT | Performed by: FAMILY MEDICINE

## 2021-02-09 PROCEDURE — 99214 OFFICE O/P EST MOD 30 MIN: CPT | Performed by: FAMILY MEDICINE

## 2021-02-09 PROCEDURE — 3725F SCREEN DEPRESSION PERFORMED: CPT | Performed by: FAMILY MEDICINE

## 2021-02-09 PROCEDURE — 81001 URINALYSIS AUTO W/SCOPE: CPT | Performed by: FAMILY MEDICINE

## 2021-02-09 RX ORDER — NEOMYCIN/POLYMYXIN B/PRAMOXINE 3.5-10K-1
CREAM (GRAM) TOPICAL DAILY
COMMUNITY

## 2021-02-09 NOTE — PROGRESS NOTES
Assessment/Plan:       Problem List Items Addressed This Visit     None      Visit Diagnoses     Cloudy urine    -  Primary- UA in office neg, will send for UA and culture  Stay well hydrated  Relevant Orders    Urinalysis with microscopic    Urine culture    Acute bilateral low back pain without sciatica    - suspect musculoskeletal etiology  achey across b/l low back which feels better with palpation/pressure  UA reassuring in office but will send out  Pt declines any medication for this and will let me know if it is not self-resolving  Localized swelling, mass and lump, neck    - has been present for 2 years or more  Doesn't think it is changing  Will check u/s  Prev u/s was normal from 2 yrs ago    Relevant Orders    US head neck soft tissue    Visit for screening mammogram        Relevant Orders    Mammo screening bilateral w 3d & cad    Screening for HIV without presence of risk factors        Relevant Orders    HIV 1/2 Antigen/Antibody (4th Generation) w Reflex [facility]                 Subjective:     Elma Street is a 50 y o  female here today with chief complaint below:  Chief Complaint   Patient presents with    Back Pain     4-5 days ago, has not been taking anything just strethces and has been radiating towards the stomach , pt states it feels like cramping     other     cloudy urine started today, no foul smell, no blood in urine, no frequency    Nocturia     the last week has been waking up 3+ times in one night      - CC above per clinical staff and reviewed  HPI:  Here w concern of back pain for a few days  Got better yesterday and then present again today  Feels achey on both sides of low back  No known trauma but states she may have slipped on ice while out walking  Cloudy urine noticed this AM without dysuria, freq, urgency  No blood in urine  No radiation down leg  Pain in back b/l lower- started about last Thursday    This AM it was more achey and radiating into her abdomen  Dull constant ache  Not taking anything for it  Just wants to be sure not a UTI    She also notes that she's had a lump on her posterior L neck for a few years  Still present  Did have an ultrasound with benign findings    The following portions of the patient's history were reviewed and updated as appropriate: allergies, current medications, past family history, past medical history, past social history, past surgical history and problem list     ROS:  Review of Systems   No fever, chills, congestion, chest pain, shortness of breath, nausea, vomiting, diarrhea, constipation, blood in stool,  mood changes  Rest of ROS neg except as above  Objective:      /70   Pulse 74   Temp (!) 97 1 °F (36 2 °C)   Resp 18   Ht 5' 3" (1 6 m)   Wt 46 7 kg (103 lb)   SpO2 99%   BMI 18 25 kg/m²   BP Readings from Last 3 Encounters:   02/09/21 110/70   02/03/20 112/74   01/13/20 122/74     Wt Readings from Last 3 Encounters:   02/09/21 46 7 kg (103 lb)   09/04/20 48 1 kg (106 lb)   02/03/20 48 2 kg (106 lb 3 2 oz)               Physical Exam:   Physical Exam  Vitals signs and nursing note reviewed  Constitutional:       Appearance: Normal appearance  HENT:      Head: Normocephalic and atraumatic  Eyes:      Conjunctiva/sclera: Conjunctivae normal    Neck:      Comments: Posterior neck with less than 1 cm mobile mass (L)  Cardiovascular:      Rate and Rhythm: Normal rate and regular rhythm  Heart sounds: No murmur  Pulmonary:      Effort: Pulmonary effort is normal       Breath sounds: Normal breath sounds  No wheezing or rhonchi  Abdominal:      General: Abdomen is flat  Palpations: Abdomen is soft  Tenderness: There is no abdominal tenderness  Musculoskeletal:      Comments: Notes area of tenderness b/l upper lumbar region with relief upon palpation of the area   Neurological:      Mental Status: She is alert     Psychiatric:         Mood and Affect: Mood normal  Behavior: Behavior normal

## 2021-02-10 LAB — BACTERIA UR CULT: NORMAL

## 2021-02-11 ENCOUNTER — COSMETIC (OUTPATIENT)
Dept: PLASTIC SURGERY | Facility: CLINIC | Age: 46
End: 2021-02-11

## 2021-02-11 VITALS — WEIGHT: 103 LBS | TEMPERATURE: 97.8 F | BODY MASS INDEX: 18.25 KG/M2 | HEIGHT: 63 IN

## 2021-02-11 DIAGNOSIS — Z41.1 ENCOUNTER FOR COSMETIC SURGERY: Primary | ICD-10-CM

## 2021-02-11 PROCEDURE — RECHECK: Performed by: PLASTIC SURGERY

## 2021-02-11 NOTE — PROGRESS NOTES
Botulinum Toxin Injection Procedure Note     Procedure: Cosmetic botulinum toxin administration     Date of procedure: 2/11/2021     Pre-operative Diagnosis: Dynamic rhytides     Post-operative Diagnosis: Same     Surgeon: Shivani Lima MD     Complications:  None     Brief history: Nick Frank desires botulinum toxin injection of her crew's feet area  I discussed with the patient this proposed procedure of botulinum toxin injections, which is customized depending on the particular needs of the patient  It is performed on facial rhytids as a temporary correction  The alternatives were discussed with the patient  The risks were addressed including bleeding, scarring, infection, damage to deeper structures, asymmetry, and chronic pain, which may occur infrequently after a procedure  The individual's choice to undergo a surgical procedure is based on the comparison of risks to potential benefits  Other risks include unsatisfactory results, brow ptosis, eyelid ptosis, allergic reaction, temporary paralysis, which should go away with time, bruising, blurring disturbances and delayed healing  Botulinum toxin injections do not arrest the aging process or produce permanent tightening of the eyelid  Operative intervention maybe necessary to maintain the results of a blepharoplasty or botulinum toxin  The patient understands and wishes to proceed  An informed consent was signed and informational brochures given to her prior to the procedure      Procedure: The area was prepped with alcohol and dried with a clean gauze  Using a clean technique, the botulinum toxin was diluted with 4 cc of preservative-free normal saline which was slowly injected with an 18 gauge needle in 1 cc tuberculin syringes  Subsequently 30 gauge needles were used to inject the botulinum toxin  This mixture allow for an aliquot of 2 5 units per 0 1 cc in each injection site        Bilateral Crew's feet 15 U     No complications were noted  Light pressure was held for 5 minutes  She was instructed explicitly in post-operative care  Patient expressed her desire to improve her nasolabial folds and cheeks  We discussed briefly about the role of filler for these areas   She will consider this in near future

## 2021-02-15 ENCOUNTER — HOSPITAL ENCOUNTER (OUTPATIENT)
Dept: ULTRASOUND IMAGING | Facility: HOSPITAL | Age: 46
Discharge: HOME | End: 2021-02-15
Attending: FAMILY MEDICINE
Payer: COMMERCIAL

## 2021-02-15 DIAGNOSIS — R22.1 LOCALIZED SWELLING, MASS AND LUMP, NECK: ICD-10-CM

## 2021-02-15 PROCEDURE — 76536 US EXAM OF HEAD AND NECK: CPT

## 2021-02-18 ENCOUNTER — TELEPHONE (OUTPATIENT)
Dept: FAMILY MEDICINE CLINIC | Facility: CLINIC | Age: 46
End: 2021-02-18

## 2021-02-18 NOTE — TELEPHONE ENCOUNTER
Patient returned call, she refused to go to the ED and scheduled an appointment with Dr Son Ortega tomorrow morning

## 2021-02-18 NOTE — TELEPHONE ENCOUNTER
Patient called, left a message returning out call  Placed call to patient, left a detailed voice message (okay per communication form), advising of ED eval and if refusing to call our office for an appointment tomorrow afternoon  Requested a return call to confirm she received this message

## 2021-02-18 NOTE — TELEPHONE ENCOUNTER
It's hard to tell without exam- concern for possible kidney stone vs spine/musculoskeletal etiology  At her previous visit, pain was crampy, wasn't needing to take any medication for it  Given that it sounds like her pain is much more severe, would suggest ER for further workup  If she declines ER, please see if she could be seen tomorrow in office as an acute

## 2021-02-18 NOTE — TELEPHONE ENCOUNTER
Patient called, left a message stating she is still having lower back pain  Was seen on 2/9/21 for possible UTI, but cuklture was negative  Patient stated the back pain is significant, keeping her up all night, and excruciating at night with lateral movement  Patient stated she was to call to give an update if her lower back pain did not resolve  Patient is requesting imaging to be done  No OTC medications,  But is using biofreeze  Will route to provider for follow up instructions

## 2021-02-18 NOTE — PROGRESS NOTES
Assessment/Plan:  Problem List Items Addressed This Visit     None      Visit Diagnoses     Acute bilateral low back pain without sciatica    -  Primary    Relevant Orders    POCT urine HCG (Completed) - Negative    Urinalysis with microscopic    Urine culture    POCT urine dip auto non-scope (Completed)    Ambulatory referral to Physical Therapy    XR spine lumbar minimum 4 views non injury    XR sacroiliac joints 3+ views    U/A c minimal leuks, which is similar to urine studies 1/9/21  Suspect SI joint dysfunction s/p near fall  Use Motrin/Tylenol, heat/ice, Home Exercise Program given  To  PT  Though unlikely, patient concerned about bony cancerous lesions - pending lumbar and B/L SI joint Xrays  Underweight        Chronic  Recommend lifestyle modifications  Body mass index (BMI) less than 19 in adult       See above  Return if symptoms worsen or fail to improve  Future Appointments   Date Time Provider Suleiman Zelaya   2/27/2021  8:30 AM DAMON Acosta   4/6/2021  8:73 AM DO TENISHA Llaons [facility]   4/7/2021  8:20 AM Augustine Melendrez MD [facility]   4/7/2021 12:00 PM Herb Farah MD [facility]        Subjective:     Hay Castle is a 50 y o  female who presents today for a follow-up on her acute medical conditions  HPI:  Chief Complaint   Patient presents with    Back Pain     -- Above per clinical staff and reviewed  --    HPI      Today:      Back Pain - Symptoms x 2 weeks  B/L SI joints, sometimes dull or burning, sometimes radiates to B/L UQ  Constant pain  Intermittent in severity, awoke her from sleep last night  Worse c sitting, and lateral shifting, back extension  Worse at night  Yoga unhelpful  Using Advil 200mg QD PRN c temporary benefit, Biofreeze only useful x 1 day, heat temporarily helpful    "I don't like to meds "  She denies trauma, but may be fuad her back when she prevented herself from falling, symptoms started a few days later  She worries about a metastatic lesion as she has worked in [employer]  Denies sciatica, numbness, LE weakness, loss of bowel or bladder function  Reviewed:  Labs 2/9/21 - Urine culture negative        The following portions of the patient's history were reviewed and updated as appropriate: allergies, current medications, past family history, past medical history, past social history, past surgical history and problem list       Review of Systems   Constitutional: Positive for appetite change (Decreased at times due to lower back pain)  Negative for chills, diaphoresis, fatigue, fever and unexpected weight change  Respiratory: Negative for chest tightness and shortness of breath  Cardiovascular: Negative for chest pain  Gastrointestinal: Negative for abdominal pain, blood in stool, diarrhea, nausea and vomiting  Genitourinary: Negative for dysuria  Musculoskeletal: Positive for back pain  Skin: Negative for rash  Current Outpatient Medications   Medication Sig Dispense Refill    Multiple Vitamins-Minerals (Multi-Vitamin Gummies) CHEW Chew daily Juice's multivitamin      Tretinoin, Facial Wrinkles, (Tretinoin, Emollient,) 0 05 % CREA APPLY TO AFFECTED AREA NIGHTLY 40 g 2     No current facility-administered medications for this visit  Objective:  /68   Pulse 102   Temp 97 6 °F (36 4 °C)   Resp 20   Ht 5' 3" (1 6 m)   Wt 46 7 kg (103 lb)   SpO2 100%   BMI 18 25 kg/m²    Wt Readings from Last 3 Encounters:   02/19/21 46 7 kg (103 lb)   02/11/21 46 7 kg (103 lb)   02/09/21 46 7 kg (103 lb)      BP Readings from Last 3 Encounters:   02/19/21 108/68   02/09/21 110/70   02/03/20 112/74          Physical Exam  Vitals signs and nursing note reviewed  Constitutional:       Appearance: Normal appearance  She is well-developed  HENT:      Head: Normocephalic and atraumatic        Nose: Nose normal  Mouth/Throat:      Mouth: Mucous membranes are moist       Pharynx: Oropharynx is clear  Eyes:      Extraocular Movements: Extraocular movements intact  Conjunctiva/sclera: Conjunctivae normal       Pupils: Pupils are equal, round, and reactive to light  Neck:      Musculoskeletal: Neck supple  Thyroid: No thyromegaly  Cardiovascular:      Rate and Rhythm: Normal rate and regular rhythm  Pulses: Normal pulses  Heart sounds: Normal heart sounds  Pulmonary:      Effort: Pulmonary effort is normal       Breath sounds: Normal breath sounds  Abdominal:      General: Bowel sounds are normal  There is no distension  Palpations: Abdomen is soft  There is no mass  Tenderness: There is no abdominal tenderness  There is no right CVA tenderness, left CVA tenderness, guarding or rebound  Musculoskeletal:         General: No swelling  Lumbar back: She exhibits decreased range of motion (Flexion 45 degrees)  She exhibits no tenderness, no bony tenderness, no swelling, no edema, no deformity, no laceration, no pain, no spasm and normal pulse  Right lower leg: No edema  Left lower leg: No edema  Comments: Negative SLR B/L  Skin:     General: Skin is warm and dry  Neurological:      General: No focal deficit present  Mental Status: She is alert and oriented to person, place, and time  Cranial Nerves: No cranial nerve deficit  Sensory: No sensory deficit  Motor: No weakness  Gait: Gait normal       Deep Tendon Reflexes: Reflexes normal    Psychiatric:         Mood and Affect: Mood is anxious           Lab Results:      Lab Results   Component Value Date    WBC 4 75 10/10/2018    HGB 12 7 10/10/2018    HCT 39 2 10/10/2018     10/10/2018    TRIG 64 06/29/2018    HDL 70 (H) 06/29/2018    ALT 22 04/08/2018    AST 13 04/08/2018    K 4 3 04/08/2018     04/08/2018    CREATININE 0 78 04/08/2018    BUN 13 04/08/2018    CO2 25 04/08/2018 HGBA1C 5 2 06/29/2018     No results found for: URICACID  Invalid input(s): BASENAME Vitamin D    No results found  POCT Labs      BMI Counseling: Body mass index is 18 25 kg/m²  The BMI is below normal  Dietary education for weight gain was provided  BMI Counseling: Body mass index is 18 25 kg/m²  The BMI is below normal  Dietary education for weight gain was provided to the patient today

## 2021-02-19 ENCOUNTER — OFFICE VISIT (OUTPATIENT)
Dept: FAMILY MEDICINE CLINIC | Facility: CLINIC | Age: 46
End: 2021-02-19
Payer: COMMERCIAL

## 2021-02-19 VITALS
BODY MASS INDEX: 18.25 KG/M2 | HEART RATE: 102 BPM | OXYGEN SATURATION: 100 % | HEIGHT: 63 IN | DIASTOLIC BLOOD PRESSURE: 68 MMHG | SYSTOLIC BLOOD PRESSURE: 108 MMHG | TEMPERATURE: 97.6 F | RESPIRATION RATE: 20 BRPM | WEIGHT: 103 LBS

## 2021-02-19 DIAGNOSIS — M54.50 ACUTE BILATERAL LOW BACK PAIN WITHOUT SCIATICA: Primary | ICD-10-CM

## 2021-02-19 DIAGNOSIS — R63.6 UNDERWEIGHT: ICD-10-CM

## 2021-02-19 LAB
BACTERIA UR QL AUTO: ABNORMAL /HPF
BILIRUB UR QL STRIP: NEGATIVE
CLARITY UR: CLEAR
COLOR UR: YELLOW
GLUCOSE UR STRIP-MCNC: NEGATIVE MG/DL
HGB UR QL STRIP.AUTO: NEGATIVE
HYALINE CASTS #/AREA URNS LPF: ABNORMAL /LPF
KETONES UR STRIP-MCNC: NEGATIVE MG/DL
LEUKOCYTE ESTERASE UR QL STRIP: ABNORMAL
NITRITE UR QL STRIP: NEGATIVE
NON-SQ EPI CELLS URNS QL MICRO: ABNORMAL /HPF
PH UR STRIP.AUTO: 6 [PH]
PROT UR STRIP-MCNC: NEGATIVE MG/DL
RBC #/AREA URNS AUTO: ABNORMAL /HPF
SL AMB  POCT GLUCOSE, UA: NORMAL
SL AMB LEUKOCYTE ESTERASE,UA: 15
SL AMB POCT BILIRUBIN,UA: NORMAL
SL AMB POCT BLOOD,UA: NORMAL
SL AMB POCT CLARITY,UA: CLEAR
SL AMB POCT COLOR,UA: YELLOW
SL AMB POCT KETONES,UA: NORMAL
SL AMB POCT NITRITE,UA: NORMAL
SL AMB POCT PH,UA: 6
SL AMB POCT SPECIFIC GRAVITY,UA: 1.02
SL AMB POCT URINE HCG: NORMAL
SL AMB POCT URINE PROTEIN: 15
SL AMB POCT UROBILINOGEN: 0.2
SP GR UR STRIP.AUTO: 1.02 (ref 1–1.03)
UROBILINOGEN UR QL STRIP.AUTO: 0.2 E.U./DL
WBC #/AREA URNS AUTO: ABNORMAL /HPF

## 2021-02-19 PROCEDURE — 81003 URINALYSIS AUTO W/O SCOPE: CPT | Performed by: FAMILY MEDICINE

## 2021-02-19 PROCEDURE — 81001 URINALYSIS AUTO W/SCOPE: CPT | Performed by: FAMILY MEDICINE

## 2021-02-19 PROCEDURE — 99214 OFFICE O/P EST MOD 30 MIN: CPT | Performed by: FAMILY MEDICINE

## 2021-02-19 PROCEDURE — 3008F BODY MASS INDEX DOCD: CPT | Performed by: FAMILY MEDICINE

## 2021-02-19 PROCEDURE — 81025 URINE PREGNANCY TEST: CPT | Performed by: FAMILY MEDICINE

## 2021-02-19 PROCEDURE — 87086 URINE CULTURE/COLONY COUNT: CPT | Performed by: FAMILY MEDICINE

## 2021-02-19 PROCEDURE — 1036F TOBACCO NON-USER: CPT | Performed by: FAMILY MEDICINE

## 2021-02-19 NOTE — PATIENT INSTRUCTIONS
Underweight  Underweight is defined as having a body mass index (BMI) of less than 18 5 kg/m2   Anorexia  is a loss of appetite, decreased food intake, or both  Your appetite naturally decreases as you get older  You also get full faster than you used to  This occurs because your body needs less energy  Other body changes can also lead to a decreased appetite  Even though some appetite loss is normal, you still need to get enough calories and nutrients to keep you healthy  You can start to lose too much weight if you do not eat as much food as your body needs  Unwanted weight loss can cause health problems, or worsen health problems you already have  You can also become dehydrated if you do not drink enough liquid  How to eat healthy and get enough nutrients:   · Choose healthy foods  Eat a variety of fruits, vegetables, whole grains, low-fat dairy foods, lean meats, and other protein foods  Limit foods high in fat, sugar, and salt  Limit or avoid alcohol as directed  Work with a dietitian to help you plan your meals if you need to follow a special diet  A dietitian can also teach you how to modify foods if you have trouble chewing or swallowing  · Snack on healthy foods between meals  if you only eat a small amount during meals  Snacks provide extra healthy nutrients and calories between meals  Examples include fruit, cheese, and whole grain crackers  · Drink liquids as directed  to avoid dehydration  Drink liquids between meals if they cause you to get full too quickly during meals  Ask how much liquid to drink each day and which liquids are best for you  · Use herbs, spices, and flavor enhancers to add flavor to foods  Avoid using herbs and spice blends that also contain sodium  Ask your healthcare provider or dietitian about flavor enhancers  Flavor enhancers with ham, natural andrews, and roast beef flavors can also be sprinkled on food to add flavor  · Share meals with others as often as you can  Eating with others may help you to eat better during meal time  Ask family members, neighbors, or friends to join you for lunch  There are also senior centers where you can meet people, and share meals with them  · Ask family and friends for help  with shopping or preparing foods  Ask for a ride to the grocery store, if needed  You may use Motrin (Ibuprofen) up to 800mg 3 times daily with food (in 24 hours) as needed for pain or fever  You may use Tylenol (Acetaminophen) up to 3,000mg daily (in 24 hours) as needed for pain or fever

## 2021-02-20 LAB — BACTERIA UR CULT: NORMAL

## 2021-02-21 NOTE — RESULT ENCOUNTER NOTE
Urine culture is negative for urinary tract infection  Normal skin bacteria seen  Continue plan of care      Message sent to patient via [facility] patient portal

## 2021-02-27 ENCOUNTER — IMMUNIZATIONS (OUTPATIENT)
Dept: FAMILY MEDICINE CLINIC | Facility: HOSPITAL | Age: 46
End: 2021-02-27

## 2021-02-27 DIAGNOSIS — Z23 ENCOUNTER FOR IMMUNIZATION: Primary | ICD-10-CM

## 2021-02-27 PROCEDURE — 0002A SARS-COV-2 / COVID-19 MRNA VACCINE (PFIZER-BIONTECH) 30 MCG: CPT

## 2021-02-27 PROCEDURE — 91300 SARS-COV-2 / COVID-19 MRNA VACCINE (PFIZER-BIONTECH) 30 MCG: CPT

## 2021-03-16 ENCOUNTER — TRANSCRIBE ORDERS (OUTPATIENT)
Dept: LAB | Facility: AMBULARY SURGERY CENTER | Age: 46
End: 2021-03-16

## 2021-03-16 ENCOUNTER — APPOINTMENT (OUTPATIENT)
Dept: LAB | Facility: AMBULARY SURGERY CENTER | Age: 46
End: 2021-03-16
Payer: COMMERCIAL

## 2021-03-16 DIAGNOSIS — Z11.4 SCREENING FOR HIV WITHOUT PRESENCE OF RISK FACTORS: ICD-10-CM

## 2021-03-16 PROCEDURE — 87389 HIV-1 AG W/HIV-1&-2 AB AG IA: CPT

## 2021-03-16 PROCEDURE — 36415 COLL VENOUS BLD VENIPUNCTURE: CPT

## 2021-03-17 LAB — HIV 1+2 AB+HIV1 P24 AG SERPL QL IA: NORMAL

## 2021-04-27 ENCOUNTER — ANNUAL EXAM (OUTPATIENT)
Dept: OBGYN CLINIC | Facility: CLINIC | Age: 46
End: 2021-04-27
Payer: COMMERCIAL

## 2021-04-27 VITALS
DIASTOLIC BLOOD PRESSURE: 60 MMHG | HEIGHT: 63 IN | BODY MASS INDEX: 18.96 KG/M2 | WEIGHT: 107 LBS | SYSTOLIC BLOOD PRESSURE: 112 MMHG

## 2021-04-27 DIAGNOSIS — Z01.419 WELL FEMALE EXAM WITH ROUTINE GYNECOLOGICAL EXAM: Primary | ICD-10-CM

## 2021-04-27 DIAGNOSIS — N93.9 ABNORMAL UTERINE BLEEDING (AUB): ICD-10-CM

## 2021-04-27 DIAGNOSIS — Z12.4 ENCOUNTER FOR SCREENING FOR MALIGNANT NEOPLASM OF CERVIX: ICD-10-CM

## 2021-04-27 PROCEDURE — 87624 HPV HI-RISK TYP POOLED RSLT: CPT | Performed by: OBSTETRICS & GYNECOLOGY

## 2021-04-27 PROCEDURE — G0145 SCR C/V CYTO,THINLAYER,RESCR: HCPCS | Performed by: OBSTETRICS & GYNECOLOGY

## 2021-04-27 PROCEDURE — S0612 ANNUAL GYNECOLOGICAL EXAMINA: HCPCS | Performed by: OBSTETRICS & GYNECOLOGY

## 2021-04-27 NOTE — PROGRESS NOTES
ASSESSMENT & PLAN:   Diagnoses and all orders for this visit:    Well female exam with routine gynecological exam  Encounter for screening for malignant neoplasm of cervix  -     Liquid-based pap, screening    Abnormal uterine bleeding (AUB)  -     US pelvis complete w transvaginal; Future        The following were reviewed in today's visit: ASCCP guidelines, yearly mammography, breast self exam, mammography screening ordered, exercise and healthy diet  Patient to return to office yearly for annual exam      All questions have been answered to her satisfaction  CC:  Annual Gynecologic Examination    HPI: Bj Cunningham is a 48 y o  [id number] who presents for annual gynecologic examination  She has the following concerns:  Periods sometimes stop, and the start up again  She had sex with a new partner  Health Maintenance:    She exercises 7 days per week  She wears her seatbelt routinely  She does not perform regular monthly self breast exams  Patient does try to follow a balanced diet  Last mammogram: 2018    Past Medical History:   Diagnosis Date    Varicella        History reviewed  No pertinent surgical history  Past OB/Gyn History:   Patient's last menstrual period was 04/20/2021 (exact date)  Patient is not postmenopausal    History of sexually transmitted infection No  Patient is not currently sexually active    Birth control: no method  Last Pap:  2017 NILM, neg HPV    Family History:  Family History   Problem Relation Age of Onset    Osteoarthritis Mother     Hyperlipidemia Mother     No Known Problems Father     Asthma Brother     Heart attack Maternal Grandfather 48    Vaginal cancer Maternal Aunt     Hypertension Neg Hx     Coronary artery disease Neg Hx     Diabetes Neg Hx     Stroke Neg Hx     Cancer Neg Hx     Sudden death Neg Hx         SCD       Social History:  Social History     Socioeconomic History    Marital status: /Civil Union     Spouse name: Not on file    Number of children: Not on file    Years of education: Not on file    Highest education level: Not on file   Occupational History    Not on file   Social Needs    Financial resource strain: Not on file    Food insecurity     Worry: Not on file     Inability: Not on file    Transportation needs     Medical: Not on file     Non-medical: Not on file   Tobacco Use    Smoking status: Never Smoker    Smokeless tobacco: Never Used   Substance and Sexual Activity    Alcohol use: No    Drug use: No    Sexual activity: Not Currently     Partners: Male   Lifestyle    Physical activity     Days per week: Not on file     Minutes per session: Not on file    Stress: Not on file   Relationships    Social connections     Talks on phone: Not on file     Gets together: Not on file     Attends Sabianist service: Not on file     Active member of club or organization: Not on file     Attends meetings of clubs or organizations: Not on file     Relationship status: Not on file    Intimate partner violence     Fear of current or ex partner: Not on file     Emotionally abused: Not on file     Physically abused: Not on file     Forced sexual activity: Not on file   Other Topics Concern    Not on file   Social History Narrative    Exercise habits:     Presently lives with , 2 children  She feels safe at home  Patient is currently employed  Allergies:  No Known Allergies    Medications:    Current Outpatient Medications:     Multiple Vitamins-Minerals (Multi-Vitamin Gummies) CHEW, Chew daily Juice's multivitamin, Disp: , Rfl:     Tretinoin, Facial Wrinkles, (Tretinoin, Emollient,) 0 05 % CREA, APPLY TO AFFECTED AREA NIGHTLY, Disp: 40 g, Rfl: 2    Review of Systems:  Review of Systems   Constitutional: Negative for chills, fever and unexpected weight change  Respiratory: Negative for cough and shortness of breath  Cardiovascular: Negative for chest pain and palpitations     Gastrointestinal: Negative for abdominal distention, abdominal pain, blood in stool, constipation, nausea and vomiting  Genitourinary: Positive for menstrual problem  Negative for difficulty urinating, dysuria, frequency, pelvic pain, urgency, vaginal bleeding, vaginal discharge and vaginal pain  Neurological: Negative for headaches  Physical Exam:  Ht 5' 3" (1 6 m)   Wt 48 5 kg (107 lb)   LMP 04/20/2021 (Exact Date)   BMI 18 95 kg/m²      Physical Exam  Constitutional:       General: She is awake  Appearance: Normal appearance  She is well-developed  Genitourinary:      Pelvic exam was performed with patient in the lithotomy position  Vulva, urethra, bladder, vagina and uterus normal       No vulval lesion, ulcerations or rash noted  No urethral prolapse present  Bladder is not distended or tender  No vaginal discharge, erythema, tenderness, bleeding, atrophy or prolapse  Cervix is parous  No cervical motion tenderness, discharge, lesion or polyp  Uterus is mobile  Uterus is not enlarged, tender or irregular  No uterine mass detected  Uterus is regular  No right or left adnexal mass present  Right adnexa not tender or full  Left adnexa not tender or full  HENT:      Head: Normocephalic and atraumatic  Neck:      Musculoskeletal: Neck supple  Cardiovascular:      Rate and Rhythm: Normal rate and regular rhythm  Heart sounds: Normal heart sounds  Pulmonary:      Effort: Pulmonary effort is normal  No tachypnea or respiratory distress  Breath sounds: Normal breath sounds  Chest:      Breasts:         Right: Normal  No inverted nipple, mass, nipple discharge, skin change or tenderness  Left: Normal  No inverted nipple, mass, nipple discharge, skin change or tenderness  Abdominal:      General: There is no distension  Palpations: Abdomen is soft  Tenderness: There is no abdominal tenderness  There is no guarding  Lymphadenopathy:      Upper Body:      Right upper body: No supraclavicular or axillary adenopathy  Left upper body: No supraclavicular or axillary adenopathy  Neurological:      General: No focal deficit present  Mental Status: She is alert and oriented to person, place, and time  Psychiatric:         Mood and Affect: Mood normal          Behavior: Behavior normal          Thought Content: Thought content normal          Judgment: Judgment normal    Vitals signs reviewed

## 2021-04-30 LAB
HPV HR 12 DNA CVX QL NAA+PROBE: POSITIVE
HPV16 DNA CVX QL NAA+PROBE: NEGATIVE
HPV18 DNA CVX QL NAA+PROBE: NEGATIVE

## 2021-05-03 ENCOUNTER — TELEPHONE (OUTPATIENT)
Dept: OBGYN CLINIC | Facility: CLINIC | Age: 46
End: 2021-05-03

## 2021-05-03 ENCOUNTER — LAB (OUTPATIENT)
Dept: LAB | Facility: AMBULARY SURGERY CENTER | Age: 46
End: 2021-05-03
Attending: OBSTETRICS & GYNECOLOGY
Payer: COMMERCIAL

## 2021-05-03 DIAGNOSIS — Z11.3 SCREENING FOR STD (SEXUALLY TRANSMITTED DISEASE): ICD-10-CM

## 2021-05-03 DIAGNOSIS — Z11.3 SCREENING FOR STD (SEXUALLY TRANSMITTED DISEASE): Primary | ICD-10-CM

## 2021-05-03 DIAGNOSIS — Z11.3 SCREEN FOR STD (SEXUALLY TRANSMITTED DISEASE): ICD-10-CM

## 2021-05-03 DIAGNOSIS — Z11.3 SCREEN FOR STD (SEXUALLY TRANSMITTED DISEASE): Primary | ICD-10-CM

## 2021-05-03 LAB
HBV SURFACE AB SER-ACNC: 218.2 MIU/ML
HBV SURFACE AG SER QL: NORMAL

## 2021-05-03 PROCEDURE — 36415 COLL VENOUS BLD VENIPUNCTURE: CPT

## 2021-05-03 PROCEDURE — 86706 HEP B SURFACE ANTIBODY: CPT

## 2021-05-03 PROCEDURE — 87591 N.GONORRHOEAE DNA AMP PROB: CPT

## 2021-05-03 PROCEDURE — 87491 CHLMYD TRACH DNA AMP PROBE: CPT

## 2021-05-03 PROCEDURE — 87389 HIV-1 AG W/HIV-1&-2 AB AG IA: CPT

## 2021-05-03 PROCEDURE — 87340 HEPATITIS B SURFACE AG IA: CPT

## 2021-05-03 PROCEDURE — 86592 SYPHILIS TEST NON-TREP QUAL: CPT

## 2021-05-03 PROCEDURE — 87522 HEPATITIS C REVRS TRNSCRPJ: CPT

## 2021-05-03 NOTE — TELEPHONE ENCOUNTER
Pt reviewed new test results in [facility] and would like STD testing ordered

## 2021-05-03 NOTE — TELEPHONE ENCOUNTER
Patient left message on nurse line requesting full STD panel  She declined it at last appointment, but is concerned as her pap came back positive HPV+

## 2021-05-04 LAB
C TRACH DNA SPEC QL NAA+PROBE: NEGATIVE
HCV RNA SERPL NAA+PROBE-ACNC: NORMAL IU/ML
HIV 1+2 AB+HIV1 P24 AG SERPL QL IA: NORMAL
N GONORRHOEA DNA SPEC QL NAA+PROBE: NEGATIVE
RPR SER QL: NORMAL
TEST INFORMATION: NORMAL

## 2021-05-05 ENCOUNTER — COSMETIC (OUTPATIENT)
Dept: PLASTIC SURGERY | Facility: CLINIC | Age: 46
End: 2021-05-05

## 2021-05-05 ENCOUNTER — OFFICE VISIT (OUTPATIENT)
Dept: CARDIOLOGY CLINIC | Facility: CLINIC | Age: 46
End: 2021-05-05

## 2021-05-05 VITALS
DIASTOLIC BLOOD PRESSURE: 74 MMHG | TEMPERATURE: 97.5 F | WEIGHT: 107 LBS | BODY MASS INDEX: 18.96 KG/M2 | HEART RATE: 80 BPM | SYSTOLIC BLOOD PRESSURE: 119 MMHG | HEIGHT: 63 IN

## 2021-05-05 VITALS
WEIGHT: 107 LBS | HEIGHT: 63 IN | DIASTOLIC BLOOD PRESSURE: 76 MMHG | HEART RATE: 84 BPM | BODY MASS INDEX: 18.96 KG/M2 | OXYGEN SATURATION: 96 % | SYSTOLIC BLOOD PRESSURE: 120 MMHG

## 2021-05-05 DIAGNOSIS — Z82.49 FAMILY HISTORY OF ISCHEMIC HEART DISEASE (IHD): ICD-10-CM

## 2021-05-05 DIAGNOSIS — Z41.1 ENCOUNTER FOR COSMETIC SURGERY: Primary | ICD-10-CM

## 2021-05-05 DIAGNOSIS — I49.3 PVC (PREMATURE VENTRICULAR CONTRACTION): Primary | ICD-10-CM

## 2021-05-05 LAB
LAB AP GYN PRIMARY INTERPRETATION: NORMAL
Lab: NORMAL

## 2021-05-05 PROCEDURE — 99214 OFFICE O/P EST MOD 30 MIN: CPT | Performed by: INTERNAL MEDICINE

## 2021-05-05 PROCEDURE — RECHECK: Performed by: PLASTIC SURGERY

## 2021-05-05 NOTE — PATIENT INSTRUCTIONS
Premature Ventricular Contractions   WHAT YOU NEED TO KNOW:   What are premature ventricular contractions? Premature ventricular contractions (PVCs) are an interruption in your heart rhythm  They are caused by an early signal for your heart to pump  Your risk of PVCs increases when you drink alcohol or caffeine, or if you are fatigued or stressed  It is very important for you to follow up with your healthcare provider so the cause of your PVCs can be diagnosed and treated  What are symptoms of PVCs? · A skipped heartbeat     · A fast heartbeat    · Chest pain     · Lightheadedness, dizziness, or faintness    · Tiredness with exercise or activity    How are PVCs diagnosed? Your healthcare provider will ask if you have a family history of heart problems  You may also need one or more of the following:  · An EKG  records your heart rhythm and how fast your heart beats  It is used to check for PVCs  · A Holter monitor  is a device that you wear for a period of time  It records how fast your heart beats, and if it beats in a regular pattern  You may need to wear it up to 72 hours  This will show how frequent your PVCs are during your normal daily activities  · An echocardiogram  (echo) is a type of ultrasound that shows the movement and blood vessels of your heart on a monitor  How are PVCs treated? Your treatment will depend on the cause of your PVCs  You may need any of the following:  · Heart medicine  may be given to make your heart beat at a regular rate and rhythm  · Cardiac ablation  is a procedure that is used to treat an abnormal heart rhythm  Ask your healthcare provider for more information  Call 911 if:   · You have any of the following signs of a heart attack:      ?  Squeezing, pressure, or pain in your chest    ? You may  also have any of the following:     § Discomfort or pain in your back, neck, jaw, stomach, or arm    § Shortness of breath    § Nausea or vomiting    § Lightheadedness or a sudden cold sweat      When should I contact my healthcare provider? · You still have symptoms after treatment, or your symptoms worsen  · You have questions or concerns about your condition or care  CARE AGREEMENT:   You have the right to help plan your care  Learn about your health condition and how it may be treated  Discuss treatment options with your healthcare providers to decide what care you want to receive  You always have the right to refuse treatment  The above information is an  only  It is not intended as medical advice for individual conditions or treatments  Talk to your doctor, nurse or pharmacist before following any medical regimen to see if it is safe and effective for you  © Copyright [address] Information is for End User's use only and may not be sold, redistributed or otherwise used for commercial purposes   All illustrations and images included in CareNotes® are the copyrighted property of A D A M , Inc  or [address] [place] [address] [facility]

## 2021-05-05 NOTE — PROGRESS NOTES
Assessment/Plan   Diagnoses and all orders for this visit:    Encounter for cosmetic surgery    Ms Canelo Fan is a 48 y o  F with hypomastia  I believe she would benefit from bilateral breast augmentation and pinch lower blepharoplasty  Patient not interested in trans conjunctival approach for fat removal   We discussed about different types of implants, incision location, shape of implant as well as long term sequelae of this procedure  The procedure for  was discussed at length with the patient during her visit today  The operative details and expected post-operative course were outlined  I also explained that potential complications included, but were not limited to: change or loss in sensation of the nipple and surrounding nipple/areola complex that may be transient or permanent, scars that will be visible on cut surfaces of the breast, wound separation, infection, breast asymmetry, increased or decreased pigmentation of the skin and/or nipple-areola complex, pain, blood loss, hematoma, seroma, contour deformity, and fat necrosis  The patient expressed a reasonable understanding of the procedure and possible complications  Photos were taken at today's visit  We will plan to see Ms Driss joel for a pre-operative visit once her surgery is scheduled to review the details of her operation, answer further questions, and place operative markings  Josias Cuevas MD   [place] [address]   [address] [place] [address]   Office: [phone]        Subjective   Patient ID: Vicente Sethi is a 48 y o  female  Vitals:    05/05/21 1104   BP: 119/74   Pulse: 80   Temp: 97 5 °F (36 4 °C)     Breast Augmentation  Vicente Sethi is a 48 y o  female who desires possible breast augmentation  She currently wears a  A bra size but breast much smaller and desires to be a full A or small B cup size via  breast augmentation    She understands that one cannot guarantee breast cup size by implants as it is an inexact measurement  Patient also manifest dissatisfaction with her wrinkles and excess skin on her lower eyelids  She notices improvement with skin regimen and Botox but has seen the limitations  She does not take aspirin on a regular basis and does not smoke  She does not have a personal and/or family history of breast cancer  The following portions of the patient's history were reviewed and updated as appropriate: allergies, current medications, past family history, past medical history, past social history, past surgical history and problem list     Review of Systems    Objective   Physical Exam   Constitutional  She appears well-developed and well-nourished  Eyes  Pupils are equal, round, and reactive to light  System normal        Eyelids -        Right: Right upper eyelid fat and lid position is normal  The right eye upper eyelid skin exhibits excess skin, fine wrinkles and crow's feet  Right lower eyelid lid position is normal  The right lower eyelid skin exhibits excess skin and fine wrinkles  Right lower eyelid fat is positive for tear trough deformity and pseudoherniation  Right lower eyelid lid-cheek junction shows festoons  Left: Left upper eyelid fat and lid position is normal  The left upper eyelid skin exhibits excess skin, fine wrinkles and crow's feet  Left lower eyelid lid position is normal  The left lower eyelid skin exhibits excess skin and fine wrinkles  Left lower eyelid fat is positive for tear trough deformity and pseudoherniation  Left lower eyelid lid-cheek junction shows festoons  Cardiovascular: Normal rate  Pulmonary/Chest  Effort normal      Psychiatric  She has a normal mood and affect   Her behavior is normal      Breast  Breast Measurements:   Right Left   A: Sternal notch to nipple distance (cm) 19 5 20   B: Midsternum to nipple distance (cm)     C: Midclavicle to nipple distance (cm)     D: Nipple to inframammary fold (cm) 5 5   E: Base width (cm)     F: Inframammary distance (cm) 17      Right Left   Areolar diameter (cm) 4 3 5   Nipple diameter (cm)     Superior tissue pinch test (cm)     Breast ptosis (grade 0-3) 0 0         Her breasts have normal envelope compliance  Her breasts have adequate tissue coverage  Additional breast conditions include the following:   Right Breast: Positive for inverted nipple  Left Breast: Positive for inverted nipple

## 2021-05-05 NOTE — PROGRESS NOTES
Cardiology Consultation     Tej Torres  [id number]  1975  [facility] [address] [address]    1  PVC (premature ventricular contraction)     2  Family history of ischemic heart disease (IHD)       Chief Complaint   Patient presents with    Follow-up     yearly    Shortness of Breath     talking or heavy exercise    Palpitations     rare    Dizziness     at random, no cause just occurs     HPI: Patient feels well, without complaints other than very mild palpitations, occasionally - not too bothersome  No reported chest pain, shortness of breath, lightheadedness, syncope, LE edema, orthopnea, PND, or significant weight changes  Patient remains active without any increased fatigue out of the ordinary        Patient Active Problem List   Diagnosis    Vitamin D deficiency    Family history of ischemic heart disease (IHD)    Diffuse cystic mastopathy    PVC (premature ventricular contraction)    Primary insomnia    Elevated TSH    Lymphadenopathy of head and neck    Chronic eczematous otitis externa of left ear    Excessive daytime sleepiness    Restless sleeper    TMJ syndrome     Past Medical History:   Diagnosis Date    Varicella      Social History     Socioeconomic History    Marital status: /Civil Union     Spouse name: Not on file    Number of children: Not on file    Years of education: Not on file    Highest education level: Not on file   Occupational History    Not on file   Social Needs    Financial resource strain: Not on file    Food insecurity     Worry: Not on file     Inability: Not on file   [employer] needs     Medical: Not on file     Non-medical: Not on file   Tobacco Use    Smoking status: Never Smoker    Smokeless tobacco: Never Used   Substance and Sexual Activity    Alcohol use: Yes     Comment: ocassional, social    Drug use: No    Sexual activity: Not Currently     Partners: Male     Birth control/protection: None     Comment:    Lifestyle    Physical activity     Days per week: Not on file     Minutes per session: Not on file    Stress: Not on file   Relationships    Social connections     Talks on phone: Not on file     Gets together: Not on file     Attends Restorationist service: Not on file     Active member of club or organization: Not on file     Attends meetings of clubs or organizations: Not on file     Relationship status: Not on file    Intimate partner violence     Fear of current or ex partner: Not on file     Emotionally abused: Not on file     Physically abused: Not on file     Forced sexual activity: Not on file   Other Topics Concern    Not on file   Social History Narrative    Exercise habits:      Family History   Problem Relation Age of Onset    Osteoarthritis Mother     Hyperlipidemia Mother     No Known Problems Father     Asthma Brother     Heart attack Maternal Grandfather 48    Vaginal cancer Maternal Aunt     Hypertension Neg Hx     Coronary artery disease Neg Hx     Diabetes Neg Hx     Stroke Neg Hx     Cancer Neg Hx     Sudden death Neg Hx         SCD     History reviewed  No pertinent surgical history      Current Outpatient Medications:     Multiple Vitamins-Minerals (Multi-Vitamin Gummies) CHEW, Chew daily Juice's multivitamin, Disp: , Rfl:     Tretinoin, Facial Wrinkles, (Tretinoin, Emollient,) 0 05 % CREA, APPLY TO AFFECTED AREA NIGHTLY, Disp: 40 g, Rfl: 2  No Known Allergies  Vitals:    05/05/21 1603   BP: 120/76   Pulse: 84   SpO2: 96%   Weight: 48 5 kg (107 lb)   Height: 5' 3" (1 6 m)       Labs:  Lab on 05/03/2021   Component Date Value    Hep B S Ab 05/03/2021 218 20     Hepatitis B Surface Ag 05/03/2021 Non-reactive     HCV PCR Quantitative 05/03/2021 HCV Not Detected     Test Information 05/03/2021 Comment     RPR 05/03/2021 Non-Reactive     HIV-1/HIV-2 Ab 05/03/2021 Non-Reactive     N gonorrhoeae, DNA Probe 05/03/2021 Negative     Chlamydia trachomatis, D* 05/03/2021 Negative    Annual Exam on 04/27/2021   Component Date Value    Case Report 04/27/2021                      Value:Gynecologic Cytology Report                       Case: [id number]                                  Authorizing Provider:  Hollie Kinney DO         Collected:           04/27/2021 1048              Ordering Location:     [facility]  Received:            04/27/2021 1048                                     Health                                                                       First Screen:          Michael Poster, CT                                                    Specimen:    LIQUID-BASED PAP, SCREENING, Cervix, Endocervical                                          Primary Interpretation 04/27/2021 Negative for intraepithelial lesion or malignancy     Specimen Adequacy 04/27/2021 Satisfactory for evaluation  Endocervical/transformation zone component present   Additional Information 04/27/2021                      Value: This result contains rich text formatting which cannot be displayed here      HPV Other HR 04/27/2021 Positive*    HPV16 04/27/2021 Negative     HPV18 04/27/2021 Negative    Appointment on 03/16/2021   Component Date Value    HIV-1/HIV-2 Ab 03/16/2021 Non-Reactive    Office Visit on 02/19/2021   Component Date Value    URINE HCG 02/19/2021 neg     Clarity, UA 02/19/2021 Clear     Color, UA 02/19/2021 Yellow     Specific Gravity, UA 02/19/2021 1 018     pH, UA 02/19/2021 6 0     Glucose, UA 02/19/2021 Negative     Ketones, UA 02/19/2021 Negative     Blood, UA 02/19/2021 Negative     Protein, UA 02/19/2021 Negative     Nitrite, UA 02/19/2021 Negative     Bilirubin, UA 02/19/2021 Negative     Urobilinogen, UA 02/19/2021 0 2     Leukocytes, UA 02/19/2021 Moderate*    WBC, UA 02/19/2021 10-20*    RBC, UA 02/19/2021 None Seen     Hyaline Casts, UA 02/19/2021 None Seen     Bacteria, UA 02/19/2021 Occasional     Epithelial Cells 02/19/2021 None Seen     Urine Culture 02/19/2021 <10,000 cfu/ml Gamma Hemolytic Streptococcus      COLOR,UA 02/19/2021 yellow     CLARITY,UA 02/19/2021 clear     SPECIFIC GRAVITY,UA 02/19/2021 1 025      PH,UA 02/19/2021 6 0     LEUKOCYTE ESTERASE,UA 02/19/2021 15     NITRITE,UA 02/19/2021 neg     GLUCOSE, UA 02/19/2021 neg     KETONES,UA 02/19/2021 neg     BILIRUBIN,UA 02/19/2021 neg     BLOOD,UA 02/19/2021 neg     POCT URINE PROTEIN 02/19/2021 15     SL AMB POCT UROBILINOGEN 02/19/2021 0 2    Office Visit on 02/09/2021   Component Date Value    Clarity, UA 02/09/2021 Clear     Color, UA 02/09/2021 Yellow     Specific Gravity, UA 02/09/2021 1 012     pH, UA 02/09/2021 6 5     Glucose, UA 02/09/2021 Negative     Ketones, UA 02/09/2021 Negative     Blood, UA 02/09/2021 Negative     Protein, UA 02/09/2021 Negative     Nitrite, UA 02/09/2021 Negative     Bilirubin, UA 02/09/2021 Negative     Urobilinogen, UA 02/09/2021 0 2     Leukocytes, UA 02/09/2021 Trace*    WBC, UA 02/09/2021 2-4     RBC, UA 02/09/2021 None Seen     Hyaline Casts, UA 02/09/2021 None Seen     Bacteria, UA 02/09/2021 None Seen     Epithelial Cells 02/09/2021 None Seen     Urine Culture 02/09/2021 No Growth <1000 cfu/mL      Lab Results   Component Value Date    TRIG 64 06/29/2018    HDL 70 (H) 06/29/2018     Imaging: No results found  Review of Systems:  Review of Systems   Constitutional: Negative for activity change, appetite change, chills, diaphoresis, fatigue and unexpected weight change  HENT: Negative for hearing loss, nosebleeds and sore throat  Eyes: Negative for photophobia and visual disturbance  Respiratory: Negative for cough, chest tightness, shortness of breath and wheezing  Cardiovascular: Positive for palpitations  Negative for chest pain and leg swelling     Gastrointestinal: Negative for abdominal pain, diarrhea, nausea and vomiting  Endocrine: Negative for polyuria  Genitourinary: Negative for dysuria, frequency and hematuria  Musculoskeletal: Negative for arthralgias, back pain, gait problem and neck pain  Skin: Negative for pallor and rash  Neurological: Negative for dizziness, syncope and headaches  Hematological: Does not bruise/bleed easily  Psychiatric/Behavioral: Negative for behavioral problems and confusion  Physical Exam:  Physical Exam   Constitutional: She is oriented to person, place, and time  She appears well-developed and well-nourished  HENT:   Head: Normocephalic and atraumatic  Nose: Nose normal    Eyes: Pupils are equal, round, and reactive to light  EOM are normal  No scleral icterus  Neck: Normal range of motion  Neck supple  No JVD present  Cardiovascular: Normal rate, regular rhythm and normal heart sounds  Exam reveals no gallop and no friction rub  No murmur heard  Pulmonary/Chest: Effort normal and breath sounds normal  No respiratory distress  She has no wheezes  She has no rales  Abdominal: Soft  Bowel sounds are normal  She exhibits no distension  There is no abdominal tenderness  Musculoskeletal: Normal range of motion  General: No deformity or edema  Neurological: She is alert and oriented to person, place, and time  No cranial nerve deficit  Skin: Skin is warm and dry  No rash noted  She is not diaphoretic  Psychiatric: She has a normal mood and affect  Her behavior is normal    Vitals reviewed  Blood pressure 120/76, pulse 84, height 5' 3" (1 6 m), weight 48 5 kg (107 lb), last menstrual period 04/20/2021, SpO2 96 %, not currently breastfeeding  EKG:  Normal sinus rhythm  Normal ECG    Discussion/Summary:  PVCs: seems to be well controlled currently  Not currently on any medical therapy for this    Would continue her on Magnesium oxide 400mg tabs up to twice a day to help suppress PVCs, if they become worse again along with reducing caffeine intake and increasing water intake with electrolytes  Echo to make sure there are no effects on her LV morphology, revealed normal structure and function of her heart

## 2021-05-19 ENCOUNTER — TELEPHONE (OUTPATIENT)
Dept: PLASTIC SURGERY | Facility: CLINIC | Age: 46
End: 2021-05-19

## 2021-05-20 ENCOUNTER — TELEMEDICINE (OUTPATIENT)
Dept: FAMILY MEDICINE CLINIC | Facility: CLINIC | Age: 46
End: 2021-05-20
Payer: COMMERCIAL

## 2021-05-20 ENCOUNTER — TELEPHONE (OUTPATIENT)
Dept: FAMILY MEDICINE CLINIC | Facility: CLINIC | Age: 46
End: 2021-05-20

## 2021-05-20 VITALS — BODY MASS INDEX: 18.96 KG/M2 | WEIGHT: 107 LBS | HEIGHT: 63 IN

## 2021-05-20 DIAGNOSIS — F43.21 SITUATIONAL DEPRESSION: Primary | ICD-10-CM

## 2021-05-20 PROCEDURE — 3725F SCREEN DEPRESSION PERFORMED: CPT | Performed by: FAMILY MEDICINE

## 2021-05-20 PROCEDURE — 1036F TOBACCO NON-USER: CPT | Performed by: FAMILY MEDICINE

## 2021-05-20 PROCEDURE — 3008F BODY MASS INDEX DOCD: CPT | Performed by: FAMILY MEDICINE

## 2021-05-20 PROCEDURE — 99214 OFFICE O/P EST MOD 30 MIN: CPT | Performed by: FAMILY MEDICINE

## 2021-05-20 RX ORDER — SERTRALINE HYDROCHLORIDE 25 MG/1
25 TABLET, FILM COATED ORAL DAILY
Qty: 30 TABLET | Refills: 1 | Status: SHIPPED | OUTPATIENT
Start: 2021-05-20 | End: 2021-07-19 | Stop reason: SDUPTHER

## 2021-05-20 NOTE — TELEPHONE ENCOUNTER
Spoke to patient and obtained the below Pre-op information:    Name of procedure:Bilateral Breast Augmentation    Date of procedure (within 30 days): 7/23     Surgeon: Dr Deonna Noyola    Location of procedure (hospital or out patient facility name): [facility]    PATs date/location/ type (Which labs? EKG? CXR?): Surgeon order bloodwork ([facility])   Pt is unsure about EKG    Records (on epic or requested):   Type of anaesthesia:  General    Paperwork to complete for Pre-op (patient bringing vs  calling office to obtain prior to appointment): Kirstin Lopes is in Dr Sherrie De Los Santos red folder    Pre-op appointment scheduled (date/time): 06/09/2021

## 2021-05-20 NOTE — PROGRESS NOTES
Virtual Regular Visit      Assessment/Plan:    Problem List Items Addressed This Visit     None      Visit Diagnoses     Situational depression    -  Primary    Relevant Medications    sertraline (ZOLOFT) 25 mg tablet        Continue meeting with therapist which has been helpful  Start zoloft 25 mg daily  Reviewed use/side effects  She will let me know of any worsening of mood  Follow up in 4 weeks  Reason for visit is   Chief Complaint   Patient presents with    Virtual Regular Visit        Encounter provider Vivian Cuellar MD    Provider located at [facility]  [address] [address] [phone]      Recent Visits  Date Type Provider Dept   05/20/21 Telephone Vivian Cuellar MD [facility]   05/20/21 Telemedicine Vivian Cuellar MD [facility] recent visits within past 7 days and meeting all other requirements     Future Appointments  No visits were found meeting these conditions  Showing future appointments within next 150 days and meeting all other requirements        The patient was identified by name and date of birth  Bj Cunningham was informed that this is a telemedicine visit and that the visit is being conducted through [address] [place] [address] Now and patient was informed that this is a secure, HIPAA-compliant platform  She agrees to proceed     My office door was closed  No one else was in the room  She acknowledged consent and understanding of privacy and security of the video platform  The patient has agreed to participate and understands they can discontinue the visit at any time  VIDEO/AUDIO POOR CONNECTION  PT WAS WORKING ON CAMPUS AND CAME UP TO OFFICE FOR EVAL  Patient is aware this is a billable service  Subjective  Bj Cunningham is a 48 y o  female with concern for depression   She has been seeing a therapist   The therapist advised her to look into medication for depression  No SI    She notes that she has been unhappy in her marriage for quite some time  Had an affair which lasted a few months  That is now ended  Trying to decide what to do about her marriage  Feels guilt/shame  Overwhelmed  Doesn't love the idea of being on medication but feels she needs something to help get her through  PHQ-9 Depression Screening    PHQ-9:   Frequency of the following problems over the past two weeks:      Little interest or pleasure in doing things: 3 - nearly every day  Feeling down, depressed, or hopeless: 3 - nearly every day  Trouble falling or staying asleep, or sleeping too much: 2 - more than half the days  Feeling tired or having little energy: 1 - several days  Poor appetite or overeatin - not at all  Feeling bad about yourself - or that you are a failure or have let yourself or your family down: 3 - nearly every day  Trouble concentrating on things, such as reading the newspaper or watching television: 1 - several days  Moving or speaking so slowly that other people could have noticed  Or the opposite - being so fidgety or restless that you have been moving around a lot more than usual: 2 - more than half the days  Thoughts that you would be better off dead, or of hurting yourself in some way: 0 - not at all  PHQ-2 Score: 6  PHQ-9 Score: 15         HPI     Past Medical History:   Diagnosis Date    Otitis media 2019    Varicella        History reviewed  No pertinent surgical history  Current Outpatient Medications   Medication Sig Dispense Refill    Multiple Vitamins-Minerals (Multi-Vitamin Gummies) CHEW Chew daily Juice's multivitamin      Tretinoin, Facial Wrinkles, (Tretinoin, Emollient,) 0 05 % CREA APPLY TO AFFECTED AREA NIGHTLY 40 g 2    sertraline (ZOLOFT) 25 mg tablet Take 1 tablet (25 mg total) by mouth daily 30 tablet 1     No current facility-administered medications for this visit           No Known Allergies    Review of Systems    Video Exam    Vitals:    21 0802   Weight: 48 5 kg (107 lb)   Height: 5' 3" (1 6 m)       Physical Exam  Vitals signs and nursing note reviewed  Constitutional:       Appearance: Normal appearance  She is not ill-appearing  HENT:      Head: Normocephalic and atraumatic  Pulmonary:      Effort: Pulmonary effort is normal  No respiratory distress  Neurological:      Mental Status: She is alert and oriented to person, place, and time  Psychiatric:      Comments: Depressed affect, tearful at times        PT PRESENT IN OFFICE:    I spent 20 minutes directly with the patient during this visit      VIRTUAL VISIT DISCLAIMER    Rojas Marlyn acknowledges that she has consented to an online visit or consultation  She understands that the online visit is based solely on information provided by her, and that, in the absence of a face-to-face physical evaluation by the physician, the diagnosis she receives is both limited and provisional in terms of accuracy and completeness  This is not intended to replace a full medical face-to-face evaluation by the physician  Bob Cline understands and accepts these terms

## 2021-05-21 ENCOUNTER — OFFICE VISIT (OUTPATIENT)
Dept: DERMATOLOGY | Facility: CLINIC | Age: 46
End: 2021-05-21

## 2021-05-21 DIAGNOSIS — L98.8 RHYTIDES: Primary | ICD-10-CM

## 2021-05-21 PROCEDURE — NC001 PR NO CHARGE: Performed by: DERMATOLOGY

## 2021-05-21 NOTE — PROGRESS NOTES
Soft Tissue Augmentation with Fillers       Screening Questions   Have you ever had filler injected before? No     Are you pregnant or breastfeeding? no  Do you take aspirin, fish oil or any other blood thinning medicines? no  Have you had an allergic reaction to any injectables before? no  Have you had any surgeries on your face? no      Diagnosis: volume loss, rhytides    Informed consent: Discussed risks include but are not limited to: Pain, bleeding, swelling, allergic reaction, necrosis, infection, granuloma formation, potential blindness, undesirable cosmetic result, need for additional treatments  Patient is aware that this is an elective procedure  Written informed consent was obtained  Preparation: The area was cleansed with alcohol  Topical anesthetic used for 30 minutes  Procedure Details: The soft tissue filler was injected into the SQ with the appropriate needle  Injections with were done slowly with a close evaluation of the anatomy and the vasculature  Pressure applied to any bleeding  Ice packs offered for swelling  Bilateral cheeks  Product: restylane lyft  Lot Number: [id number]  Expiration: 2023-10-31  Volume injected: 2 5 cc     Oral commissures  Product: restylane  Lot Number: [id number]  Expiration: 2023-10-31  Volume injected: 1 cc       Plan: Patient was instructed to apply ice at home to minimize swelling  Pt is aware that the product lasts for 6-12 months  It will soften the appearance of the volume loss or rhytides but they will not completely resolve  Written aftercare instructions provided  Patient instructed to not use make up for 24-48 hours, avoid strenuous exercise for 24 hours  Call for any problems           DYSPORT Procedure Note    Screening Questions   Are you pregnant or breastfeeding? no  Do you take aspirin, fish oil or any other blood thinning medicines? no  Have you had an allergic reaction to any injectables before? no  Have you had any surgeries on your face? no  Have you been diagnosed with a muscle or nerve condition like myasthenia gravis, ALS or Lambert-Eaton syndrome? no    Diagnosis: rhytides    Location: glabella, tail of eyebrows and BLESSING    Informed consent: Discussed risks (infection, pain, bleeding, bruising, swelling, allergic reaction, paralysis of nearby muscles, eyelid droop, double vision, neck weakness, difficulty breathing, headache, undesirable cosmetic result, need for additional treatment, and death) and benefits of the procedure, as well as the alternatives  Informed consent was obtained  Photographs taken in chart after verbal consent     Preparation: The area was cleansed with alcohol  Procedure Details: dysport was injected into the dermis with a 30-gauge needle  Pressure applied to any bleeding  Lot Number: [id number]  Expiration: 08/31/21  Total Units Injected: 20U glabella, 8U tail of eyebrows and 8U to DAOs     Plan: Patient instructed to remain upright for 6 hours  Tylenol may be used for headache  Allow 2 weeks before returning to clinic for additional dosing as needed  Call for any problems  Written instructions provided       THIS IS A TEST PATIENT WHO WAS TREATED WITH FREE SAMPLES   DO NOT BILL OR CHARGE     Venu Varner MD

## 2021-05-23 ENCOUNTER — PATIENT MESSAGE (OUTPATIENT)
Dept: FAMILY MEDICINE CLINIC | Facility: CLINIC | Age: 46
End: 2021-05-23

## 2021-05-23 DIAGNOSIS — Z11.4 SCREENING FOR HIV (HUMAN IMMUNODEFICIENCY VIRUS): Primary | ICD-10-CM

## 2021-05-23 DIAGNOSIS — Z11.3 SCREEN FOR STD (SEXUALLY TRANSMITTED DISEASE): ICD-10-CM

## 2021-05-24 NOTE — TELEPHONE ENCOUNTER
From: [employer]  To: Jennifer Tierney MD  Sent: 5/23/2021 6:08 PM EDT  Subject: Visit Socorro Leavitt,   I wonder if you could please order me another HIV antigen/antibody test  I had unprotected sex in the beginning of March, was tested in the beginning of May which was negative but I feel like I would like one more test closer to 90 days out  I can be reached at [phone] or TT    Thank you,  Maria G

## 2021-06-01 ENCOUNTER — APPOINTMENT (OUTPATIENT)
Dept: LAB | Facility: AMBULARY SURGERY CENTER | Age: 46
End: 2021-06-01
Attending: OBSTETRICS & GYNECOLOGY
Payer: COMMERCIAL

## 2021-06-01 DIAGNOSIS — Z11.4 SCREENING FOR HIV (HUMAN IMMUNODEFICIENCY VIRUS): ICD-10-CM

## 2021-06-01 DIAGNOSIS — Z41.1 ENCOUNTER FOR COSMETIC SURGERY: ICD-10-CM

## 2021-06-01 DIAGNOSIS — Z11.3 SCREEN FOR STD (SEXUALLY TRANSMITTED DISEASE): ICD-10-CM

## 2021-06-01 PROCEDURE — 36415 COLL VENOUS BLD VENIPUNCTURE: CPT

## 2021-06-01 PROCEDURE — 87389 HIV-1 AG W/HIV-1&-2 AB AG IA: CPT

## 2021-06-02 LAB — HIV 1+2 AB+HIV1 P24 AG SERPL QL IA: NORMAL

## 2021-06-14 ENCOUNTER — TELEPHONE (OUTPATIENT)
Dept: FAMILY MEDICINE CLINIC | Facility: CLINIC | Age: 46
End: 2021-06-14

## 2021-06-14 NOTE — TELEPHONE ENCOUNTER
Left detailed message ([facility] per communication form in chart ) informing patient and to call the office with any further questions or concerns

## 2021-06-14 NOTE — TELEPHONE ENCOUNTER
Pt called and wants to get the Guardasil vaccine but is older than the recommended age and wants to know if she can still get it

## 2021-06-30 ENCOUNTER — CLINICAL SUPPORT (OUTPATIENT)
Dept: OBGYN CLINIC | Facility: CLINIC | Age: 46
End: 2021-06-30
Payer: COMMERCIAL

## 2021-06-30 VITALS
WEIGHT: 108 LBS | BODY MASS INDEX: 19.14 KG/M2 | SYSTOLIC BLOOD PRESSURE: 118 MMHG | HEIGHT: 63 IN | DIASTOLIC BLOOD PRESSURE: 64 MMHG

## 2021-06-30 DIAGNOSIS — Z23 NEED FOR HPV VACCINE: Primary | ICD-10-CM

## 2021-06-30 PROCEDURE — 90651 9VHPV VACCINE 2/3 DOSE IM: CPT | Performed by: OBSTETRICS & GYNECOLOGY

## 2021-06-30 PROCEDURE — 3008F BODY MASS INDEX DOCD: CPT | Performed by: FAMILY MEDICINE

## 2021-06-30 PROCEDURE — 96372 THER/PROPH/DIAG INJ SC/IM: CPT | Performed by: OBSTETRICS & GYNECOLOGY

## 2021-06-30 PROCEDURE — 90471 IMMUNIZATION ADMIN: CPT | Performed by: OBSTETRICS & GYNECOLOGY

## 2021-06-30 NOTE — PROGRESS NOTES
CPT code 39900 - patient states her insurance company advised her to use this code**    Patient here for her 1st HPV vaccine  Area prepped with alcohol swab, injection given in left deltoid per patient's request  Tolerated well, no concerns  Patient saw for 15 minutes prior to leaving the office  Next appointment scheduled with

## 2021-07-07 ENCOUNTER — HOSPITAL ENCOUNTER (OUTPATIENT)
Dept: ULTRASOUND IMAGING | Facility: HOSPITAL | Age: 46
Discharge: HOME | End: 2021-07-07
Attending: OBSTETRICS & GYNECOLOGY
Payer: COMMERCIAL

## 2021-07-07 DIAGNOSIS — N93.9 ABNORMAL UTERINE BLEEDING (AUB): ICD-10-CM

## 2021-07-07 PROCEDURE — 76856 US EXAM PELVIC COMPLETE: CPT

## 2021-07-07 PROCEDURE — 76830 TRANSVAGINAL US NON-OB: CPT

## 2021-07-19 ENCOUNTER — TELEPHONE (OUTPATIENT)
Dept: OBGYN CLINIC | Facility: CLINIC | Age: 46
End: 2021-07-19

## 2021-07-19 ENCOUNTER — TELEPHONE (OUTPATIENT)
Dept: FAMILY MEDICINE CLINIC | Facility: CLINIC | Age: 46
End: 2021-07-19

## 2021-07-19 DIAGNOSIS — F43.21 SITUATIONAL DEPRESSION: ICD-10-CM

## 2021-07-19 RX ORDER — SERTRALINE HYDROCHLORIDE 25 MG/1
25 TABLET, FILM COATED ORAL DAILY
Qty: 30 TABLET | Refills: 5 | Status: SHIPPED | OUTPATIENT
Start: 2021-07-19 | End: 2021-11-02 | Stop reason: ALTCHOICE

## 2021-07-19 NOTE — TELEPHONE ENCOUNTER
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

## 2021-07-19 NOTE — TELEPHONE ENCOUNTER
Medication refill requested: Zoloft 25 mg   Last office visit: 05/20/2021  Next office visit: 08/16/2021  Last refilled: 05/20/2021  Pharm[facility]: [facility]    Pended: 01A2

## 2021-07-19 NOTE — TELEPHONE ENCOUNTER
LM for patient stating MRI order is in [facility], call central scheduled [phone] to get scheduled

## 2021-07-20 ENCOUNTER — TELEPHONE (OUTPATIENT)
Dept: OBGYN CLINIC | Facility: CLINIC | Age: 46
End: 2021-07-20

## 2021-07-20 NOTE — TELEPHONE ENCOUNTER
Attempted to obtain MRI prior-authorization for patient's MRI which is scheduled for 08/02/2021    Per [employer], patient does not meet medical necessity for immediate approval   Peer to Peer review recommended - phone number provided: [phone]

## 2021-07-22 ENCOUNTER — HOSPITAL ENCOUNTER (OUTPATIENT)
Dept: MAMMOGRAPHY | Facility: HOSPITAL | Age: 46
Discharge: HOME | End: 2021-07-22
Attending: FAMILY MEDICINE
Payer: COMMERCIAL

## 2021-07-22 VITALS — BODY MASS INDEX: 19.14 KG/M2 | HEIGHT: 63 IN | WEIGHT: 108 LBS

## 2021-07-22 DIAGNOSIS — Z12.31 VISIT FOR SCREENING MAMMOGRAM: ICD-10-CM

## 2021-07-22 PROCEDURE — 77063 BREAST TOMOSYNTHESIS BI: CPT

## 2021-07-22 PROCEDURE — 77067 SCR MAMMO BI INCL CAD: CPT

## 2021-08-02 ENCOUNTER — HOSPITAL ENCOUNTER (OUTPATIENT)
Dept: MRI IMAGING | Facility: HOSPITAL | Age: 46
Discharge: HOME | End: 2021-08-02
Attending: OBSTETRICS & GYNECOLOGY
Payer: COMMERCIAL

## 2021-08-02 DIAGNOSIS — R93.5 ABNORMAL ULTRASOUND OF ENDOMETRIUM: ICD-10-CM

## 2021-08-02 DIAGNOSIS — N94.89 ENDOMETRIAL MASS: ICD-10-CM

## 2021-08-02 PROCEDURE — G1004 CDSM NDSC: HCPCS

## 2021-08-02 PROCEDURE — 72197 MRI PELVIS W/O & W/DYE: CPT

## 2021-08-02 PROCEDURE — A9585 GADOBUTROL INJECTION: HCPCS | Performed by: OBSTETRICS & GYNECOLOGY

## 2021-08-02 RX ADMIN — GADOBUTROL 4 ML: 604.72 INJECTION INTRAVENOUS at 17:41

## 2021-08-10 ENCOUNTER — TELEPHONE (OUTPATIENT)
Dept: OBGYN CLINIC | Facility: CLINIC | Age: 46
End: 2021-08-10

## 2021-08-10 NOTE — TELEPHONE ENCOUNTER
Called and reviewed results with pt - recommend D+C hysteroscopy - messages sent to HUMPHREY Duke and Darrell

## 2021-08-10 NOTE — TELEPHONE ENCOUNTER
She is a patient of Dr Valencia Led and received her results of the MI via [facility]  Pt is very nervous and would like to speak with another provider about her results  She can be reached on her cellphone or at work [phone]

## 2021-08-16 ENCOUNTER — OFFICE VISIT (OUTPATIENT)
Dept: FAMILY MEDICINE CLINIC | Facility: CLINIC | Age: 46
End: 2021-08-16
Payer: COMMERCIAL

## 2021-08-16 ENCOUNTER — PREP FOR PROCEDURE (OUTPATIENT)
Dept: OBGYN CLINIC | Facility: CLINIC | Age: 46
End: 2021-08-16

## 2021-08-16 VITALS
RESPIRATION RATE: 16 BRPM | BODY MASS INDEX: 20.02 KG/M2 | SYSTOLIC BLOOD PRESSURE: 112 MMHG | DIASTOLIC BLOOD PRESSURE: 80 MMHG | OXYGEN SATURATION: 99 % | WEIGHT: 113 LBS | HEART RATE: 87 BPM | TEMPERATURE: 98 F | HEIGHT: 63 IN

## 2021-08-16 DIAGNOSIS — Z00.00 ANNUAL PHYSICAL EXAM: Primary | ICD-10-CM

## 2021-08-16 DIAGNOSIS — R73.01 IMPAIRED FASTING BLOOD SUGAR: ICD-10-CM

## 2021-08-16 DIAGNOSIS — N93.9 ABNORMAL UTERINE BLEEDING (AUB): Primary | ICD-10-CM

## 2021-08-16 DIAGNOSIS — R35.0 URINARY FREQUENCY: ICD-10-CM

## 2021-08-16 DIAGNOSIS — R93.89 ABNORMAL FINDING PRESENT ON DIAGNOSTIC IMAGING OF UTERUS: ICD-10-CM

## 2021-08-16 LAB
SL AMB  POCT GLUCOSE, UA: NORMAL
SL AMB LEUKOCYTE ESTERASE,UA: NORMAL
SL AMB POCT BILIRUBIN,UA: NORMAL
SL AMB POCT BLOOD,UA: NORMAL
SL AMB POCT CLARITY,UA: CLEAR
SL AMB POCT COLOR,UA: NORMAL
SL AMB POCT KETONES,UA: NORMAL
SL AMB POCT NITRITE,UA: NORMAL
SL AMB POCT PH,UA: 7
SL AMB POCT SPECIFIC GRAVITY,UA: 1
SL AMB POCT URINE PROTEIN: NORMAL
SL AMB POCT UROBILINOGEN: 0.2

## 2021-08-16 PROCEDURE — 81003 URINALYSIS AUTO W/O SCOPE: CPT | Performed by: FAMILY MEDICINE

## 2021-08-16 PROCEDURE — 99396 PREV VISIT EST AGE 40-64: CPT | Performed by: FAMILY MEDICINE

## 2021-08-16 RX ORDER — SODIUM CHLORIDE, SODIUM LACTATE, POTASSIUM CHLORIDE, CALCIUM CHLORIDE 600; 310; 30; 20 MG/100ML; MG/100ML; MG/100ML; MG/100ML
125 INJECTION, SOLUTION INTRAVENOUS CONTINUOUS
Status: CANCELLED | OUTPATIENT
Start: 2021-08-16

## 2021-08-16 RX ORDER — ACETAMINOPHEN 325 MG/1
975 TABLET ORAL ONCE
Status: CANCELLED | OUTPATIENT
Start: 2021-08-16 | End: 2021-08-16

## 2021-08-16 NOTE — PROGRESS NOTES
Assessment/Plan:       Problem List Items Addressed This Visit     None      Visit Diagnoses     Annual physical exam    -  Primary- Reviewed health maintenance/preventive care  Discussed healthy diet and exercise/activity as able  Reviewed appropriate vaccinations- utd w Covid-19 and Tdap  Has had one HPV so far  Screening labwork ordered  Reviewed prior labs  Doing well from a stress standpoint- she will consider tapering off zoloft (ok to take every other day for two weeks, then d/c) when she is ready as long as she continues to do well  Relevant Orders    Comprehensive metabolic panel    Lipid Panel with Direct LDL reflex    Impaired fasting blood sugar    - check a1c with labs    Relevant Orders    Hemoglobin A1C    Urinary frequency    - UA negative  Back pain may be multifactorial- radiates to the pelvis  She does have fibroids and also has chronic constipation  She will start miralax half dose daily to see if this is more effective than using prn      Relevant Orders    POCT urine dip auto non-scope (Completed)                 Subjective:     Miracle Rojas is a 48 y o  female here today and has the below chronic conditions:    Patient Active Problem List   Diagnosis    Vitamin D deficiency    Family history of ischemic heart disease (IHD)    Diffuse cystic mastopathy    PVC (premature ventricular contraction)    Primary insomnia    Elevated TSH    Lymphadenopathy of head and neck    Chronic eczematous otitis externa of left ear    Excessive daytime sleepiness    Restless sleeper    TMJ syndrome     Current Outpatient Medications   Medication Sig Dispense Refill    Multiple Vitamins-Minerals (Multi-Vitamin Gummies) CHEW Chew daily Juice's multivitamin      sertraline (ZOLOFT) 25 mg tablet Take 1 tablet (25 mg total) by mouth daily 30 tablet 5    Tretinoin, Facial Wrinkles, (Tretinoin, Emollient,) 0 05 % CREA APPLY TO AFFECTED AREA NIGHTLY 40 g 2     No current facility-administered medications for this visit  HPI:  Chief Complaint   Patient presents with    Physical Exam    Back Pain     x 2 weeks      - CC above per clinical staff and reviewed  Here for annual PE    Has endometrial hyperplasia - d&C sched 9/3  She was having irregular bleeding  She is having low back pain radiating around to the front/lower abdomen/pelvis    She feels a little better after urinating  Stretching helps  She sleeps on her stomach- no pain when sleeping, but feels it when she wakes up  Hasn't noticed a difference with BM in terms of pain  No dysuria, urinates more at night, not more frequent during the daytime  She continues to take zoloft  Thinking about tapering off at some point  Chronic constipation, sometimes uses miralax and colace which help  The following portions of the patient's history were reviewed and updated as appropriate: allergies, current medications, past family history, past medical history, past social history, past surgical history and problem list     ROS:  Review of Systems   No fever, chills, congestion, chest pain, shortness of breath, nausea, vomiting, diarrhea    Objective:      /80   Pulse 87   Temp 98 °F (36 7 °C)   Resp 16   Ht 5' 3" (1 6 m)   Wt 51 3 kg (113 lb)   SpO2 99%   BMI 20 02 kg/m²   BP Readings from Last 3 Encounters:   08/16/21 112/80   06/30/21 118/64   05/05/21 120/76     Wt Readings from Last 3 Encounters:   08/16/21 51 3 kg (113 lb)   07/22/21 49 kg (108 lb)   06/30/21 49 kg (108 lb)               Physical Exam:   Physical Exam  Vitals and nursing note reviewed  Constitutional:       General: She is not in acute distress  Appearance: Normal appearance  She is well-developed  HENT:      Head: Normocephalic and atraumatic  Eyes:      Conjunctiva/sclera: Conjunctivae normal    Neck:      Vascular: No carotid bruit  Cardiovascular:      Rate and Rhythm: Normal rate and regular rhythm  Heart sounds: Normal heart sounds  No murmur heard  Pulmonary:      Effort: Pulmonary effort is normal  No respiratory distress  Breath sounds: Normal breath sounds  No wheezing  Abdominal:      Palpations: Abdomen is soft  Tenderness: There is no abdominal tenderness  There is no guarding or rebound  Musculoskeletal:      Cervical back: Neck supple  Right lower leg: No edema  Left lower leg: No edema  Comments: No spinal tenderness  No paraspinal tenderness     Lymphadenopathy:      Cervical: No cervical adenopathy  Skin:     General: Skin is warm and dry  Neurological:      Mental Status: She is alert and oriented to person, place, and time     Psychiatric:         Mood and Affect: Mood normal          Behavior: Behavior normal

## 2021-08-16 NOTE — PATIENT INSTRUCTIONS
Check on coverage for colonoscopy/colon cancer screening  (starting age 39+ or do they only cover 52+)

## 2021-08-20 ENCOUNTER — ANESTHESIA EVENT (OUTPATIENT)
Dept: PERIOP | Facility: AMBULARY SURGERY CENTER | Age: 46
End: 2021-08-20
Payer: COMMERCIAL

## 2021-08-20 ENCOUNTER — OFFICE VISIT (OUTPATIENT)
Dept: OBGYN CLINIC | Facility: CLINIC | Age: 46
End: 2021-08-20
Payer: COMMERCIAL

## 2021-08-20 VITALS
HEIGHT: 63 IN | WEIGHT: 109 LBS | DIASTOLIC BLOOD PRESSURE: 62 MMHG | SYSTOLIC BLOOD PRESSURE: 100 MMHG | BODY MASS INDEX: 19.31 KG/M2

## 2021-08-20 DIAGNOSIS — N93.9 ABNORMAL UTERINE BLEEDING (AUB): ICD-10-CM

## 2021-08-20 DIAGNOSIS — Z01.818 PRE-OP EXAMINATION: Primary | ICD-10-CM

## 2021-08-20 DIAGNOSIS — N84.0 ENDOMETRIAL POLYP: ICD-10-CM

## 2021-08-20 PROCEDURE — 3008F BODY MASS INDEX DOCD: CPT | Performed by: OBSTETRICS & GYNECOLOGY

## 2021-08-20 PROCEDURE — 99214 OFFICE O/P EST MOD 30 MIN: CPT | Performed by: OBSTETRICS & GYNECOLOGY

## 2021-08-20 PROCEDURE — 1036F TOBACCO NON-USER: CPT | Performed by: OBSTETRICS & GYNECOLOGY

## 2021-08-20 NOTE — H&P (VIEW-ONLY)
Ryan Sullivan was seen today for pre-op exam     Diagnoses and all orders for this visit:    Pre-op examination    Endometrial polyp    Abnormal uterine bleeding (AUB)         Plan    Paulina Recio is scheduled for [facility] , hysteroscopy, resection of uterine pathology on 9/3/2021  Pre-op instructions, including showering with Hibiclens, discussed with patient and patient received paper copy  The risks, benefits and alternatives to the procedure were discussed  We discussed the risks of pain, bleeding, blood clot, infection, allergic reaction, neurovascular injury, injury to uterus, injury to surrounding structures such as bowel, bladder and/or ureters, and possibility of inability to complete the procedure  We discussed code status - full code  Blood transfusion is acceptable  We discussed resident physician participation in the procedure, including pelvic exam   All questions answered, consent obtained  PATIENT REQUESTS THAT RESIDENT DOES NOT DO CASE  Will use myosure XL due to lesion size 2 0cm  Subjective     Paulina Recio is a 48 y o  female here for a pre-op consultation  Patient called the office with abnormal uterine bleeding and an ultrasound was performed  The ultrasound showed a 14 mm stripe with an eye so hypoechoic structure with internal vascularity measuring 1 7 x 2 1 x 1 1 cm likely representing an endometrial polyp versus fibroid  The radiology read recommended further evaluation with sonohysterogram or MRI of the pelvis and patient requested MRI of the pelvis which was performed  Which showed possible polyp within the area of thickening as suggested by the ultrasound  There is an intramural fibroid noted but none noted within the cavity  D&C with hysteroscopy and resection of uterine pathology was recommended, patient agreeable and is scheduled for 9/3/2021  All questions answered today      Patient Active Problem List   Diagnosis    Vitamin D deficiency    Family history of ischemic heart disease (IHD)    Diffuse cystic mastopathy    PVC (premature ventricular contraction)    Primary insomnia    Elevated TSH    Lymphadenopathy of head and neck    Chronic eczematous otitis externa of left ear    Restless sleeper    TMJ syndrome         Gynecologic History  Patient's last menstrual period was 2021 (approximate)  Contraception: abstinence and condoms  Last Pap: 2021  Results were: normal; + other HR HPV  Last mammogram: 2021  Results were: normal    Obstetric History  OB History    Para Term  AB Living   2 2 2     2   SAB TAB Ectopic Multiple Live Births           2      # Outcome Date GA Lbr Conor/2nd Weight Sex Delivery Anes PTL Lv   2 Term      Vag-Spont   FRANSISCO   1 Term      Vag-Spont   FRANSISCO       Past Medical/Surgical/Family/Social History    Past Medical History:   Diagnosis Date    Otitis media 2019    Varicella      History reviewed  No pertinent surgical history    Family History   Problem Relation Age of Onset    Osteoarthritis Mother     Hyperlipidemia Mother     No Known Problems Father     Asthma Brother     Heart attack Maternal Grandfather 48    Vaginal cancer Maternal Aunt         unknown age   [place] No Known Problems Sister     No Known Problems Paternal Aunt     Hypertension Neg Hx     Coronary artery disease Neg Hx     Diabetes Neg Hx     Stroke Neg Hx     Cancer Neg Hx     Sudden death Neg Hx         SCD     Social History     Socioeconomic History    Marital status: /Civil Union     Spouse name: Not on file    Number of children: 2    Years of education: Not on file    Highest education level: Not on file   Occupational History    Not on file   Tobacco Use    Smoking status: Never Smoker    Smokeless tobacco: Never Used   Vaping Use    Vaping Use: Never used   Substance and Sexual Activity    Alcohol use: Yes     Comment: ocassional, social    Drug use: No    Sexual activity: Not Currently Partners: Male     Birth control/protection: Abstinence, Condom Male     Comment:    Other Topics Concern    Not on file   Social History Narrative    Exercise habits:     Social Determinants of Health     Financial Resource Strain:     Difficulty of Paying Living Expenses:    Food Insecurity:     Worried About Running Out of Food in the Last Year:     Ran Out of Food in the Last Year:    Transportation Needs:     Lack of Transportation (Medical):  Lack of Transportation (Non-Medical):    Physical Activity:     Days of Exercise per Week:     Minutes of Exercise per Session:    Stress:     Feeling of Stress :    Social Connections:     Frequency of Communication with Friends and Family:     Frequency of Social Gatherings with Friends and Family:     Attends Zoroastrian Services:     Active Member of Clubs or Organizations:     Attends Club or Organization Meetings:     Marital Status:    Intimate Partner Violence:     Fear of Current or Ex-Partner:     Emotionally Abused:     Physically Abused:     Sexually Abused:          Patient has no known allergies  Current Outpatient Medications:     Multiple Vitamins-Minerals (Multi-Vitamin Gummies) CHEW, Chew daily Juice's multivitamin, Disp: , Rfl:     sertraline (ZOLOFT) 25 mg tablet, Take 1 tablet (25 mg total) by mouth daily, Disp: 30 tablet, Rfl: 5    Tretinoin, Facial Wrinkles, (Tretinoin, Emollient,) 0 05 % CREA, APPLY TO AFFECTED AREA NIGHTLY, Disp: 40 g, Rfl: 2      Review of Systems  Review of Systems   Genitourinary: Positive for menstrual problem and vaginal bleeding  Objective     /62 (BP Location: Right arm, Patient Position: Sitting, Cuff Size: Large)   Ht 5' 3" (1 6 m)   Wt 49 4 kg (109 lb)   LMP 08/05/2021 (Approximate)   BMI 19 31 kg/m²     Physical Exam  Constitutional:       General: She is not in acute distress  Appearance: Normal appearance  She is well-developed  She is not ill-appearing  Cardiovascular:      Rate and Rhythm: Normal rate and regular rhythm  Pulmonary:      Effort: Pulmonary effort is normal  No respiratory distress  Neurological:      General: No focal deficit present  Mental Status: She is alert and oriented to person, place, and time  Psychiatric:         Mood and Affect: Mood normal          Behavior: Behavior normal          Thought Content: Thought content normal          Judgment: Judgment normal    Vitals and nursing note reviewed  MRI pelvis female wo and w contrast  Narrative: MRI OF THE PELVIS WITH AND WITHOUT CONTRAST (GYNECOLOGIC)    INDICATION:  Endometrial lesion noted on ultrasound; history of abnormal uterine bleeding    COMPARISON: Ultrasound from 7/7/2021    TECHNIQUE: The following pulse sequences were obtained:  Axial T1, axial and sagittal T2, coronal T2 with fat saturation, pre-contrast axial T1 with fat saturation, post-contrast axial and sagittal T1 with fat saturation  IV Contrast:  4 mL of Gadobutrol injection (SINGLE-DOSE)     FINDINGS:    UTERUS:   Junctional zone:  Normal in thickness  Myometrium:  Nodular areas of low signal is consistent with small myomas  One dominant lesion to the left of midline involving the anterior fundus measures up to 1 9 cm in size on image 14, series 4  This correlates with the ultrasound  Endometrium: The endometrial cavity is widened measuring up to 1 3 cm  There is slight heterogeneity but there is no evidence of central nodularity and no evidence of intracavitary low signal fibroid  With the aministration of intravenous contrast,   there is some slight delay in enhancement of the endometrial cavity but no enhancing nodules can be appreciated  Diffusion weighted images demonstrate diffuse increase restriction but without more focal abnormality as suggested on prior ultrasound  ADNEXA:    Right:  Ovary normal in size and morphology  No adnexal mass identified     No evidence of hydrosalpinx  Left:  Ovary normal in size and morphology  No adnexal mass identified  No evidence of hydrosalpinx  BLADDER: Normal     PELVIC CAVITY:  No lymphadenopathy  BOWEL:  Unremarkable MRI appearance  OSSEOUS STRUCTURES:   No osseous destruction  VASCULAR STRUCTURES:  Visualized vasculature is normal     PELVIC WALL:  Normal   Impression: Enlarged 13 mm endometrial cavity with high T2-weighted signal    No focal areas of additional enhancement are noted within the endometrial cavity  Given persistence since July, phase of menstruation is less likely  Findings suggest endometrial   hyperplasia  It is possible that is isointense polyp is present within this area of thickening as suggested by the ultrasound  Findings should be correlated with severity and duration of dysfunctional uterine bleeding  Endometrial sampling could be   considered  There is a intramural myoma but no evidence of an intracavitary myoma  The study was marked in EPIC for significant notification  Workstation performed: [id number]    7/19/2021  PELVIC ULTRASOUND, COMPLETE     INDICATION:  48years old  N93 9: Abnormal uterine and vaginal bleeding, unspecified      COMPARISON: None     TECHNIQUE:   Transabdominal pelvic ultrasound was performed in sagittal and transverse planes with a curvilinear transducer  Additional transvaginal imaging was performed to better evaluate the endometrium and ovaries  Imaging included volumetric   sweeps as well as traditional still imaging technique      FINDINGS:     UTERUS:  The uterus is anteverted in position, measuring 8 1 x 5 9 x 4 8 cm  There is mildly heterogeneous myometrial echotexture  There is an intramural leiomyoma in the uterine body measuring 1 6 x 1 6 x 1 4 cm  The cervix shows no suspicious abnormality      ENDOMETRIUM:    Normal caliber of 14 mm     There is an iso- hypoechoic structure with internal vascularity in the fundal endometrium measuring 1 7 x 2 1 x 1 0 cm, likely representing an endometrial polyp versus an intracavitary leiomyoma      OVARIES/ADNEXA:  Right ovary:  3 1 x 2 0 x 1 7 cm  No suspicious right ovarian abnormality  Doppler flow within normal limits      Left ovary:  2 1 x 1 4 x 1 6 cm  No suspicious left ovarian abnormality  Doppler flow within normal limits      No suspicious adnexal mass or loculated collections  There is no free fluid      IMPRESSION:     1  Iso- hypoechoic structure with internal vascularity in the endometrium of uterine fundus measuring 1 7 x 2 1 x 1 0 cm, likely representing an endometrial polyp versus an intracavitary leiomyoma  Further evaluation with sonohysterogram or MRI pelvis   is recommended  2   Heterogeneous uterus with a 1 6 cm intramural leiomyoma in the uterine body      3   Normal ovaries  No suspicious adnexal mass      The study was marked in EPIC for significant notification

## 2021-08-20 NOTE — PROGRESS NOTES
Vivek Howell was seen today for pre-op exam     Diagnoses and all orders for this visit:    Pre-op examination    Endometrial polyp    Abnormal uterine bleeding (AUB)         Plan    Julia Holliday is scheduled for [facility] , hysteroscopy, resection of uterine pathology on 9/3/2021  Pre-op instructions, including showering with Hibiclens, discussed with patient and patient received paper copy  The risks, benefits and alternatives to the procedure were discussed  We discussed the risks of pain, bleeding, blood clot, infection, allergic reaction, neurovascular injury, injury to uterus, injury to surrounding structures such as bowel, bladder and/or ureters, and possibility of inability to complete the procedure  We discussed code status - full code  Blood transfusion is acceptable  We discussed resident physician participation in the procedure, including pelvic exam   All questions answered, consent obtained  PATIENT REQUESTS THAT RESIDENT DOES NOT DO CASE  Will use myosure XL due to lesion size 2 0cm  Subjective     Julia Holliday is a 48 y o  female here for a pre-op consultation  Patient called the office with abnormal uterine bleeding and an ultrasound was performed  The ultrasound showed a 14 mm stripe with an eye so hypoechoic structure with internal vascularity measuring 1 7 x 2 1 x 1 1 cm likely representing an endometrial polyp versus fibroid  The radiology read recommended further evaluation with sonohysterogram or MRI of the pelvis and patient requested MRI of the pelvis which was performed  Which showed possible polyp within the area of thickening as suggested by the ultrasound  There is an intramural fibroid noted but none noted within the cavity  D&C with hysteroscopy and resection of uterine pathology was recommended, patient agreeable and is scheduled for 9/3/2021  All questions answered today      Patient Active Problem List   Diagnosis    Vitamin D deficiency    Family history of ischemic heart disease (IHD)    Diffuse cystic mastopathy    PVC (premature ventricular contraction)    Primary insomnia    Elevated TSH    Lymphadenopathy of head and neck    Chronic eczematous otitis externa of left ear    Restless sleeper    TMJ syndrome         Gynecologic History  Patient's last menstrual period was 2021 (approximate)  Contraception: abstinence and condoms  Last Pap: 2021  Results were: normal; + other HR HPV  Last mammogram: 2021  Results were: normal    Obstetric History  OB History    Para Term  AB Living   2 2 2     2   SAB TAB Ectopic Multiple Live Births           2      # Outcome Date GA Lbr Conor/2nd Weight Sex Delivery Anes PTL Lv   2 Term      Vag-Spont   FRANSISCO   1 Term      Vag-Spont   FRANSISCO       Past Medical/Surgical/Family/Social History    Past Medical History:   Diagnosis Date    Otitis media 2019    Varicella      History reviewed  No pertinent surgical history    Family History   Problem Relation Age of Onset    Osteoarthritis Mother     Hyperlipidemia Mother     No Known Problems Father     Asthma Brother     Heart attack Maternal Grandfather 48    Vaginal cancer Maternal Aunt         unknown age   Wily Silvino No Known Problems Sister     No Known Problems Paternal Aunt     Hypertension Neg Hx     Coronary artery disease Neg Hx     Diabetes Neg Hx     Stroke Neg Hx     Cancer Neg Hx     Sudden death Neg Hx         SCD     Social History     Socioeconomic History    Marital status: /Civil Union     Spouse name: Not on file    Number of children: 2    Years of education: Not on file    Highest education level: Not on file   Occupational History    Not on file   Tobacco Use    Smoking status: Never Smoker    Smokeless tobacco: Never Used   Vaping Use    Vaping Use: Never used   Substance and Sexual Activity    Alcohol use: Yes     Comment: ocassional, social    Drug use: No    Sexual activity: Not Currently Partners: Male     Birth control/protection: Abstinence, Condom Male     Comment:    Other Topics Concern    Not on file   Social History Narrative    Exercise habits:     Social Determinants of Health     Financial Resource Strain:     Difficulty of Paying Living Expenses:    Food Insecurity:     Worried About Running Out of Food in the Last Year:     Ran Out of Food in the Last Year:    Transportation Needs:     Lack of Transportation (Medical):  Lack of Transportation (Non-Medical):    Physical Activity:     Days of Exercise per Week:     Minutes of Exercise per Session:    Stress:     Feeling of Stress :    Social Connections:     Frequency of Communication with Friends and Family:     Frequency of Social Gatherings with Friends and Family:     Attends Rastafarian Services:     Active Member of Clubs or Organizations:     Attends Club or Organization Meetings:     Marital Status:    Intimate Partner Violence:     Fear of Current or Ex-Partner:     Emotionally Abused:     Physically Abused:     Sexually Abused:          Patient has no known allergies  Current Outpatient Medications:     Multiple Vitamins-Minerals (Multi-Vitamin Gummies) CHEW, Chew daily Juice's multivitamin, Disp: , Rfl:     sertraline (ZOLOFT) 25 mg tablet, Take 1 tablet (25 mg total) by mouth daily, Disp: 30 tablet, Rfl: 5    Tretinoin, Facial Wrinkles, (Tretinoin, Emollient,) 0 05 % CREA, APPLY TO AFFECTED AREA NIGHTLY, Disp: 40 g, Rfl: 2      Review of Systems  Review of Systems   Genitourinary: Positive for menstrual problem and vaginal bleeding  Objective     /62 (BP Location: Right arm, Patient Position: Sitting, Cuff Size: Large)   Ht 5' 3" (1 6 m)   Wt 49 4 kg (109 lb)   LMP 08/05/2021 (Approximate)   BMI 19 31 kg/m²     Physical Exam  Constitutional:       General: She is not in acute distress  Appearance: Normal appearance  She is well-developed  She is not ill-appearing  Cardiovascular:      Rate and Rhythm: Normal rate and regular rhythm  Pulmonary:      Effort: Pulmonary effort is normal  No respiratory distress  Neurological:      General: No focal deficit present  Mental Status: She is alert and oriented to person, place, and time  Psychiatric:         Mood and Affect: Mood normal          Behavior: Behavior normal          Thought Content: Thought content normal          Judgment: Judgment normal    Vitals and nursing note reviewed  MRI pelvis female wo and w contrast  Narrative: MRI OF THE PELVIS WITH AND WITHOUT CONTRAST (GYNECOLOGIC)    INDICATION:  Endometrial lesion noted on ultrasound; history of abnormal uterine bleeding    COMPARISON: Ultrasound from 7/7/2021    TECHNIQUE: The following pulse sequences were obtained:  Axial T1, axial and sagittal T2, coronal T2 with fat saturation, pre-contrast axial T1 with fat saturation, post-contrast axial and sagittal T1 with fat saturation  IV Contrast:  4 mL of Gadobutrol injection (SINGLE-DOSE)     FINDINGS:    UTERUS:   Junctional zone:  Normal in thickness  Myometrium:  Nodular areas of low signal is consistent with small myomas  One dominant lesion to the left of midline involving the anterior fundus measures up to 1 9 cm in size on image 14, series 4  This correlates with the ultrasound  Endometrium: The endometrial cavity is widened measuring up to 1 3 cm  There is slight heterogeneity but there is no evidence of central nodularity and no evidence of intracavitary low signal fibroid  With the aministration of intravenous contrast,   there is some slight delay in enhancement of the endometrial cavity but no enhancing nodules can be appreciated  Diffusion weighted images demonstrate diffuse increase restriction but without more focal abnormality as suggested on prior ultrasound  ADNEXA:    Right:  Ovary normal in size and morphology  No adnexal mass identified     No evidence of hydrosalpinx  Left:  Ovary normal in size and morphology  No adnexal mass identified  No evidence of hydrosalpinx  BLADDER: Normal     PELVIC CAVITY:  No lymphadenopathy  BOWEL:  Unremarkable MRI appearance  OSSEOUS STRUCTURES:   No osseous destruction  VASCULAR STRUCTURES:  Visualized vasculature is normal     PELVIC WALL:  Normal   Impression: Enlarged 13 mm endometrial cavity with high T2-weighted signal    No focal areas of additional enhancement are noted within the endometrial cavity  Given persistence since July, phase of menstruation is less likely  Findings suggest endometrial   hyperplasia  It is possible that is isointense polyp is present within this area of thickening as suggested by the ultrasound  Findings should be correlated with severity and duration of dysfunctional uterine bleeding  Endometrial sampling could be   considered  There is a intramural myoma but no evidence of an intracavitary myoma  The study was marked in EPIC for significant notification  Workstation performed: [id number]    7/19/2021  PELVIC ULTRASOUND, COMPLETE     INDICATION:  48years old  N93 9: Abnormal uterine and vaginal bleeding, unspecified      COMPARISON: None     TECHNIQUE:   Transabdominal pelvic ultrasound was performed in sagittal and transverse planes with a curvilinear transducer  Additional transvaginal imaging was performed to better evaluate the endometrium and ovaries  Imaging included volumetric   sweeps as well as traditional still imaging technique      FINDINGS:     UTERUS:  The uterus is anteverted in position, measuring 8 1 x 5 9 x 4 8 cm  There is mildly heterogeneous myometrial echotexture  There is an intramural leiomyoma in the uterine body measuring 1 6 x 1 6 x 1 4 cm  The cervix shows no suspicious abnormality      ENDOMETRIUM:    Normal caliber of 14 mm     There is an iso- hypoechoic structure with internal vascularity in the fundal endometrium measuring 1 7 x 2 1 x 1 0 cm, likely representing an endometrial polyp versus an intracavitary leiomyoma      OVARIES/ADNEXA:  Right ovary:  3 1 x 2 0 x 1 7 cm  No suspicious right ovarian abnormality  Doppler flow within normal limits      Left ovary:  2 1 x 1 4 x 1 6 cm  No suspicious left ovarian abnormality  Doppler flow within normal limits      No suspicious adnexal mass or loculated collections  There is no free fluid      IMPRESSION:     1  Iso- hypoechoic structure with internal vascularity in the endometrium of uterine fundus measuring 1 7 x 2 1 x 1 0 cm, likely representing an endometrial polyp versus an intracavitary leiomyoma  Further evaluation with sonohysterogram or MRI pelvis   is recommended  2   Heterogeneous uterus with a 1 6 cm intramural leiomyoma in the uterine body      3   Normal ovaries  No suspicious adnexal mass      The study was marked in EPIC for significant notification

## 2021-08-24 ENCOUNTER — APPOINTMENT (OUTPATIENT)
Dept: LAB | Facility: AMBULARY SURGERY CENTER | Age: 46
End: 2021-08-24
Payer: COMMERCIAL

## 2021-08-24 DIAGNOSIS — Z00.00 ANNUAL PHYSICAL EXAM: ICD-10-CM

## 2021-08-24 DIAGNOSIS — R73.01 IMPAIRED FASTING BLOOD SUGAR: ICD-10-CM

## 2021-08-24 LAB
ALBUMIN SERPL BCP-MCNC: 3.9 G/DL (ref 3.5–5)
ALP SERPL-CCNC: 50 U/L (ref 46–116)
ALT SERPL W P-5'-P-CCNC: 19 U/L (ref 12–78)
ANION GAP SERPL CALCULATED.3IONS-SCNC: 4 MMOL/L (ref 4–13)
AST SERPL W P-5'-P-CCNC: 14 U/L (ref 5–45)
BILIRUB SERPL-MCNC: 0.66 MG/DL (ref 0.2–1)
BUN SERPL-MCNC: 13 MG/DL (ref 5–25)
CALCIUM SERPL-MCNC: 8.9 MG/DL (ref 8.3–10.1)
CHLORIDE SERPL-SCNC: 108 MMOL/L (ref 100–108)
CHOLEST SERPL-MCNC: 196 MG/DL (ref 50–200)
CO2 SERPL-SCNC: 25 MMOL/L (ref 21–32)
CREAT SERPL-MCNC: 0.89 MG/DL (ref 0.6–1.3)
EST. AVERAGE GLUCOSE BLD GHB EST-MCNC: 100 MG/DL
GFR SERPL CREATININE-BSD FRML MDRD: 77 ML/MIN/1.73SQ M
GLUCOSE P FAST SERPL-MCNC: 79 MG/DL (ref 65–99)
HBA1C MFR BLD: 5.1 %
HDLC SERPL-MCNC: 66 MG/DL
LDLC SERPL CALC-MCNC: 116 MG/DL (ref 0–100)
POTASSIUM SERPL-SCNC: 3.9 MMOL/L (ref 3.5–5.3)
PROT SERPL-MCNC: 7.5 G/DL (ref 6.4–8.2)
SODIUM SERPL-SCNC: 137 MMOL/L (ref 136–145)
TRIGL SERPL-MCNC: 70 MG/DL

## 2021-08-24 PROCEDURE — 80061 LIPID PANEL: CPT

## 2021-08-24 PROCEDURE — 83036 HEMOGLOBIN GLYCOSYLATED A1C: CPT

## 2021-08-24 PROCEDURE — 36415 COLL VENOUS BLD VENIPUNCTURE: CPT

## 2021-08-24 PROCEDURE — 80053 COMPREHEN METABOLIC PANEL: CPT

## 2021-08-31 NOTE — PRE-PROCEDURE INSTRUCTIONS
Pre-Surgery Instructions:   Medication Instructions    Multiple Vitamins-Minerals (Multi-Vitamin Gummies) CHEW Instructed patient per Anesthesia Guidelines  stop 8/31    sertraline (ZOLOFT) 25 mg tablet Instructed patient per Anesthesia Guidelines  pm useage    Tretinoin, Facial Wrinkles, (Tretinoin, Emollient,) 0 05 % CREA Instructed patient per Anesthesia Guidelines  Pre procedure instructions reviewed verbalizes understanding  NPO after MN  Bathing reviewed  Morning meds with water  No NSAIDS  Stop supplements/vitamins

## 2021-09-03 ENCOUNTER — ANESTHESIA (OUTPATIENT)
Dept: PERIOP | Facility: AMBULARY SURGERY CENTER | Age: 46
End: 2021-09-03
Payer: COMMERCIAL

## 2021-09-03 ENCOUNTER — HOSPITAL ENCOUNTER (OUTPATIENT)
Facility: AMBULARY SURGERY CENTER | Age: 46
Setting detail: OUTPATIENT SURGERY
Discharge: HOME | End: 2021-09-03
Attending: OBSTETRICS & GYNECOLOGY | Admitting: OBSTETRICS & GYNECOLOGY
Payer: COMMERCIAL

## 2021-09-03 VITALS
OXYGEN SATURATION: 99 % | HEIGHT: 63 IN | HEART RATE: 61 BPM | DIASTOLIC BLOOD PRESSURE: 65 MMHG | SYSTOLIC BLOOD PRESSURE: 111 MMHG | BODY MASS INDEX: 19.31 KG/M2 | TEMPERATURE: 97 F | WEIGHT: 109 LBS | RESPIRATION RATE: 18 BRPM

## 2021-09-03 DIAGNOSIS — N93.9 ABNORMAL UTERINE BLEEDING (AUB): ICD-10-CM

## 2021-09-03 DIAGNOSIS — R93.89 ABNORMAL FINDING PRESENT ON DIAGNOSTIC IMAGING OF UTERUS: ICD-10-CM

## 2021-09-03 PROBLEM — Z98.890 S/P DILATION AND CURETTAGE: Status: ACTIVE | Noted: 2021-09-03

## 2021-09-03 PROBLEM — N84.0 ENDOMETRIAL POLYP: Status: ACTIVE | Noted: 2021-09-03

## 2021-09-03 LAB
EXT PREGNANCY TEST URINE: NEGATIVE
EXT. CONTROL: NORMAL

## 2021-09-03 PROCEDURE — 81025 URINE PREGNANCY TEST: CPT | Performed by: OBSTETRICS & GYNECOLOGY

## 2021-09-03 PROCEDURE — 88305 TISSUE EXAM BY PATHOLOGIST: CPT | Performed by: PATHOLOGY

## 2021-09-03 PROCEDURE — 58558 HYSTEROSCOPY BIOPSY: CPT | Performed by: OBSTETRICS & GYNECOLOGY

## 2021-09-03 RX ORDER — PROPOFOL 10 MG/ML
INJECTION, EMULSION INTRAVENOUS AS NEEDED
Status: DISCONTINUED | OUTPATIENT
Start: 2021-09-03 | End: 2021-09-03

## 2021-09-03 RX ORDER — PROPOFOL 10 MG/ML
INJECTION, EMULSION INTRAVENOUS CONTINUOUS PRN
Status: DISCONTINUED | OUTPATIENT
Start: 2021-09-03 | End: 2021-09-03

## 2021-09-03 RX ORDER — KETOROLAC TROMETHAMINE 30 MG/ML
INJECTION, SOLUTION INTRAMUSCULAR; INTRAVENOUS AS NEEDED
Status: DISCONTINUED | OUTPATIENT
Start: 2021-09-03 | End: 2021-09-03

## 2021-09-03 RX ORDER — FENTANYL CITRATE 50 UG/ML
INJECTION, SOLUTION INTRAMUSCULAR; INTRAVENOUS AS NEEDED
Status: DISCONTINUED | OUTPATIENT
Start: 2021-09-03 | End: 2021-09-03

## 2021-09-03 RX ORDER — LIDOCAINE HYDROCHLORIDE 10 MG/ML
INJECTION, SOLUTION EPIDURAL; INFILTRATION; INTRACAUDAL; PERINEURAL AS NEEDED
Status: DISCONTINUED | OUTPATIENT
Start: 2021-09-03 | End: 2021-09-03

## 2021-09-03 RX ORDER — ONDANSETRON 2 MG/ML
INJECTION INTRAMUSCULAR; INTRAVENOUS AS NEEDED
Status: DISCONTINUED | OUTPATIENT
Start: 2021-09-03 | End: 2021-09-03

## 2021-09-03 RX ORDER — LIDOCAINE HYDROCHLORIDE 10 MG/ML
0.5 INJECTION, SOLUTION EPIDURAL; INFILTRATION; INTRACAUDAL; PERINEURAL ONCE AS NEEDED
Status: DISCONTINUED | OUTPATIENT
Start: 2021-09-03 | End: 2021-09-03 | Stop reason: HOSPADM

## 2021-09-03 RX ORDER — SODIUM CHLORIDE, SODIUM LACTATE, POTASSIUM CHLORIDE, CALCIUM CHLORIDE 600; 310; 30; 20 MG/100ML; MG/100ML; MG/100ML; MG/100ML
125 INJECTION, SOLUTION INTRAVENOUS CONTINUOUS
Status: DISCONTINUED | OUTPATIENT
Start: 2021-09-03 | End: 2021-09-03 | Stop reason: HOSPADM

## 2021-09-03 RX ORDER — ACETAMINOPHEN 325 MG/1
975 TABLET ORAL ONCE
Status: COMPLETED | OUTPATIENT
Start: 2021-09-03 | End: 2021-09-03

## 2021-09-03 RX ORDER — ONDANSETRON 2 MG/ML
4 INJECTION INTRAMUSCULAR; INTRAVENOUS ONCE AS NEEDED
Status: DISCONTINUED | OUTPATIENT
Start: 2021-09-03 | End: 2021-09-03 | Stop reason: HOSPADM

## 2021-09-03 RX ORDER — FENTANYL CITRATE/PF 50 MCG/ML
25 SYRINGE (ML) INJECTION
Status: DISCONTINUED | OUTPATIENT
Start: 2021-09-03 | End: 2021-09-03 | Stop reason: HOSPADM

## 2021-09-03 RX ORDER — MIDAZOLAM HYDROCHLORIDE 2 MG/2ML
INJECTION, SOLUTION INTRAMUSCULAR; INTRAVENOUS AS NEEDED
Status: DISCONTINUED | OUTPATIENT
Start: 2021-09-03 | End: 2021-09-03

## 2021-09-03 RX ORDER — SODIUM CHLORIDE, SODIUM LACTATE, POTASSIUM CHLORIDE, CALCIUM CHLORIDE 600; 310; 30; 20 MG/100ML; MG/100ML; MG/100ML; MG/100ML
125 INJECTION, SOLUTION INTRAVENOUS CONTINUOUS
Status: DISCONTINUED | OUTPATIENT
Start: 2021-09-03 | End: 2021-09-03 | Stop reason: SDUPTHER

## 2021-09-03 RX ADMIN — FENTANYL CITRATE 50 MCG: 50 INJECTION, SOLUTION INTRAMUSCULAR; INTRAVENOUS at 11:33

## 2021-09-03 RX ADMIN — SODIUM CHLORIDE, SODIUM LACTATE, POTASSIUM CHLORIDE, AND CALCIUM CHLORIDE 125 ML/HR: .6; .31; .03; .02 INJECTION, SOLUTION INTRAVENOUS at 10:59

## 2021-09-03 RX ADMIN — PROPOFOL 100 MG: 10 INJECTION, EMULSION INTRAVENOUS at 11:54

## 2021-09-03 RX ADMIN — FENTANYL CITRATE 50 MCG: 50 INJECTION, SOLUTION INTRAMUSCULAR; INTRAVENOUS at 11:44

## 2021-09-03 RX ADMIN — FENTANYL CITRATE 25 MCG: 50 INJECTION, SOLUTION INTRAMUSCULAR; INTRAVENOUS at 11:54

## 2021-09-03 RX ADMIN — ONDANSETRON 4 MG: 2 INJECTION INTRAMUSCULAR; INTRAVENOUS at 12:02

## 2021-09-03 RX ADMIN — FENTANYL CITRATE 50 MCG: 50 INJECTION, SOLUTION INTRAMUSCULAR; INTRAVENOUS at 11:30

## 2021-09-03 RX ADMIN — ACETAMINOPHEN 975 MG: 325 TABLET, FILM COATED ORAL at 10:59

## 2021-09-03 RX ADMIN — KETOROLAC TROMETHAMINE 30 MG: 30 INJECTION, SOLUTION INTRAMUSCULAR at 12:03

## 2021-09-03 RX ADMIN — PROPOFOL 100 MG: 10 INJECTION, EMULSION INTRAVENOUS at 11:44

## 2021-09-03 RX ADMIN — MIDAZOLAM HYDROCHLORIDE 2 MG: 1 INJECTION, SOLUTION INTRAMUSCULAR; INTRAVENOUS at 11:30

## 2021-09-03 RX ADMIN — LIDOCAINE HYDROCHLORIDE 50 MG: 10 INJECTION, SOLUTION EPIDURAL; INFILTRATION; INTRACAUDAL at 11:37

## 2021-09-03 RX ADMIN — PROPOFOL 150 MCG/KG/MIN: 10 INJECTION, EMULSION INTRAVENOUS at 11:38

## 2021-09-03 NOTE — DISCHARGE INSTRUCTIONS
Endometrial Polyps   WHAT YOU NEED TO KNOW:   What is an endometrial polyp? A polyp is a mass of tissue that grows in the lining of your uterus (called the endometrium)  A polyp is connected to the lining by a stalk  A polyp may be cancer, but most polyps are benign (not cancer)  The size can range from very small to about the size of a golf ball  A large polyp may push down through the cervix and into your vagina  You may also have more than one polyp  What increases my risk for an endometrial polyp? · High estrogen (female hormone) levels    · Obesity    · Age 48 years or older    · Use of certain breast cancer medicines    What are the signs and symptoms of an endometrial polyp? You may have no signs or symptoms  The polyp may be found during tests or treatment for another condition  You may have any of the following if you do have signs or symptoms:  · Irregular bleeding during childbearing years, or trouble getting pregnant    · Heavy bleeding during your period    · Spotting, especially after sex    · Vaginal bleeding after menopause    How is an endometrial polyp diagnosed? Your healthcare provider will examine you and ask about your symptoms  Certain tests may be used to check for a polyp  During a sonohysterogram (water ultrasound), water is pushed into your uterus to keep it expanded  This helps the ultrasound see polyps more easily  A hysteroscopy is a test that uses a scope to see inside your uterus  Tissue samples may also be taken and sent to a lab to be tested for cancer  How is an endometrial polyp treated? An endometrial polyp may go away on its own without treatment  If it does not go away, causes discomfort, or gets larger, it may need to be removed  A procedure called a dilation and curettage (D&C) is usually used to remove endometrial polyps  During a D&C, your healthcare provider will remove tissue from your uterus  This will remove the polyp along with the other tissue   You may need more than 1 D&C if the polyp is not removed the first time  A hysterectomy may be used to remove your uterus if you have several polyps that are cancer  What can I do to manage an endometrial polyp? · Reach or maintain a healthy weight  Extra weight increases your estrogen level  This can increase your risk for more polyps  Talk to your healthcare provider about a healthy weight for you  He or she can help you create a weight loss plan if you need to lose weight  · Eat a variety of healthy foods  Healthy foods can help you manage your weight and lower your risk for cancer  Healthy foods include fruits, vegetables, low-fat dairy products, cooked beans, lean meats, and fish  Your healthcare provider or a dietitian can help you create a healthy meal plan  · Talk to your healthcare provider about pregnancy  You may have problems getting pregnant if the polyp affects other parts of your reproductive system  Your healthcare provider can talk to you about treatment that may help  When should I seek immediate care? · You have bleeding from your vagina that continues or is bright red  · You feel dizzy or lightheaded from heavy blood loss  When should I contact my healthcare provider? · You have new or worsening symptoms  · You have symptoms again after successful treatment for a polyp  · You have questions or concerns about your condition or care  CARE AGREEMENT:   You have the right to help plan your care  Learn about your health condition and how it may be treated  Discuss treatment options with your healthcare providers to decide what care you want to receive  You always have the right to refuse treatment  The above information is an  only  It is not intended as medical advice for individual conditions or treatments  Talk to your doctor, nurse or pharmacist before following any medical regimen to see if it is safe and effective for you    © Copyright [employer] 2021 Information is for [employer] use only and may not be sold, redistributed or otherwise used for commercial purposes  All illustrations and images included in CareNotes® are the copyrighted property of A D A M , Inc  or Emma Rodriguez      Dilation and Curettage   WHAT YOU SHOULD KNOW:   Dilation and curettage (D and C) is a procedure to remove tissue from the lining of your uterus  INSTRUCTIONS:   Medicines:   · Pain medicine: You may be given medicine to take away or decrease pain  Do not wait until the pain is severe before you take your medicine  · Antibiotics: This medicine will help fight or prevent an infection  · Take your medicine as directed  Call your healthcare provider if you think your medicine is not helping or if you have side effects  Tell him if you are allergic to any medicine  Keep a list of the medicines, vitamins, and herbs you take  Include the amounts, and when and why you take them  Bring the list or the pill bottles to follow-up visits  Carry your medicine list with you in case of an emergency  Follow up with your healthcare provider as directed:  Write down your questions so you remember to ask them during your visits  Activity:  Ask your primary healthcare provider when you can return to your normal activities  Contact your primary healthcare provider if:   · You have foul-smelling vaginal discharge  · You do not get your monthly period  · You feel depressed or anxious  · You feel very tired and weak  · You have questions or concerns about your condition or care  Return to the emergency department if:   · You have heavy vaginal bleeding that soaks 1 pad in 1 hour for 2 hours in a row  · You have a fever and abdominal cramps  · Your pain does not get better, even after treatment  © 2014 [address] is for End User's use only and may not be sold, redistributed or otherwise used for commercial purposes   All illustrations and images included in CareNotes® are the copyrighted property of [employer]  or Jamie Perera  The above information is an  only  It is not intended as medical advice for individual conditions or treatments  Talk to your doctor, nurse or pharmacist before following any medical regimen to see if it is safe and effective for you

## 2021-09-03 NOTE — ANESTHESIA PREPROCEDURE EVALUATION
Procedure:  DILATATION AND CURETTAGE (D&C) WITH HYSTEROSCOPY ( (N/A Uterus)  resection to uterine pathology (N/A Uterus)    Relevant Problems   CARDIO   (+) PVC (premature ventricular contraction)      Other   (+) Restless sleeper   (+) TMJ syndrome        Physical Exam    Airway    Mallampati score: I  TM Distance: >3 FB  Neck ROM: full     Dental       Cardiovascular      Pulmonary      Other Findings        Anesthesia Plan  ASA Score- 1     Anesthesia Type- IV sedation with anesthesia with ASA Monitors  Additional Monitors:   Airway Plan: LMA  Comment: Possible GA w/ LMA if required for assisted ventilation while under deep sedation  Plan Factors-Exercise tolerance (METS): >4 METS  Chart reviewed  Existing labs reviewed  Patient summary reviewed  Patient is not a current smoker  Induction- intravenous  Postoperative Plan- Plan for postoperative opioid use  Informed Consent- Anesthetic plan and risks discussed with patient  I personally reviewed this patient with the CRNA  Discussed and agreed on the Anesthesia Plan with the CRNA  Arlene Floyd

## 2021-09-03 NOTE — OP NOTE
OPERATIVE REPORT  PATIENT NAME: Jose Daniel Deal    :  1975  MRN: [id number]  Pt Location: [facility] OR ROOM 04    SURGERY DATE: 9/3/2021    Surgeon(s) and Role:     * Antoinette Palacios DO - Primary    Preop Diagnosis:  Abnormal uterine bleeding (AUB) [N93 9]  Abnormal finding present on diagnostic imaging of uterus [R93 89]    Post-Op Diagnosis Codes:     * Abnormal uterine bleeding (AUB) [N93 9]     * Abnormal finding present on diagnostic imaging of uterus [R93 89]   Endoemtrial polyps       Procedure(s) (LRB):  DILATATION AND CURETTAGE (D&C) WITH HYSTEROSCOPY ( (N/A)  resection to uterine pathology (N/A)    Specimen(s):  ID Type Source Tests Collected by Time Destination   1 : endometrial polyps Tissue Polyp, Cervical/Endometrial TISSUE EXAM Antoinette Palacios, DO 3/8/5977 [id number]    2 : endometrial curretings Tissue Endometrium TISSUE EXAM Antoinette Palacios, DO  [id number]        Estimated Blood Loss:   Minimal    Drains:  * No LDAs found *    Anesthesia Type:   Choice    Operative Indications:  Abnormal uterine bleeding (AUB) [N93 9]  Abnormal finding present on diagnostic imaging of uterus [R93 89]      Operative Findings:  Uterus sound to 7 cm  Polyp on anterior uterine wall  Small polyploid like tissue on posterior wall  Complications:   None    Procedure and Technique:  Patient was taken to the operating room where she was properly identified and Total Intravenous anesthesia (TIVA) was administered without difficulty  The patient was prepped and draped in the usual sterile fashion in the dorsal lithotomy position using the stirrups  Care was taken to avoid excessive flexion or extension of the hips and knees and pressure on the extremities  A time out was performed, and all were in agreement to proceed  A straight catheter was introduced into the bladder, which was drained of 100mL of clear yellow urine   A weighted speculum was inserted into the vagina and a Moisés retractor was used to visualize the anterior lip of the cervix, which was then grasped with a single toothed tenaculum  The uterus was sounded to 7cm  The cervix was serially dilated for introduction of the hysteroscope  Hysteroscope was introduced under direct visualization using normal saline solution as the distention media  Hysteroscope was advanced to the uterine fundus and the entire uterine cavity was inspected in a systematic manner  Above mentioned findings were noted  The myosure was used to remove both polyps without difficulty  Hysteroscope was withdrawn and sharp curetting was performed, starting at the 12'oclock position and rotating a total of 360 degrees to cover all surfaces  Endometrial tissue was obtained and sent for pathology  There were no significant findings  The hysteroscope was removed  All instruments were removed from the patient's vagina  There was no bleeding noted from the tenaculum site  The patient tolerated the procedure well  She was cleansed, awakened from anesthesia and taken to the recovery room in stable condition  Sponge, instrument and needle counts were correct x 2  I was present for the entire procedure       Patient Disposition:  PACU     SIGNATURE: Karen Pierce DO  DATE: September 3, 2021  TIME: 12:01 PM

## 2021-09-03 NOTE — INTERVAL H&P NOTE
H&P reviewed  After examining the patient I find no changes in the patients condition since the H&P had been written      Vitals:    09/03/21 1047   BP: 118/68   Pulse: (!) 108   Resp: 20   Temp: 97 8 °F (36 6 °C)   SpO2: 99%

## 2021-09-07 ENCOUNTER — CLINICAL SUPPORT (OUTPATIENT)
Dept: OBGYN CLINIC | Facility: CLINIC | Age: 46
End: 2021-09-07
Payer: COMMERCIAL

## 2021-09-07 VITALS — DIASTOLIC BLOOD PRESSURE: 60 MMHG | BODY MASS INDEX: 19.27 KG/M2 | SYSTOLIC BLOOD PRESSURE: 110 MMHG | WEIGHT: 108.8 LBS

## 2021-09-07 DIAGNOSIS — Z23 NEED FOR HPV VACCINE: ICD-10-CM

## 2021-09-07 DIAGNOSIS — Z23 IMMUNIZATION DUE: Primary | ICD-10-CM

## 2021-09-07 PROCEDURE — 90471 IMMUNIZATION ADMIN: CPT

## 2021-09-07 PROCEDURE — 90651 9VHPV VACCINE 2/3 DOSE IM: CPT

## 2021-09-07 NOTE — PROGRESS NOTES
Patient is here for second depo injection  Given in right deltoid   Patient to return in 3 months for third injection

## 2021-09-21 ENCOUNTER — OFFICE VISIT (OUTPATIENT)
Dept: OBGYN CLINIC | Facility: CLINIC | Age: 46
End: 2021-09-21

## 2021-09-21 VITALS
BODY MASS INDEX: 19.03 KG/M2 | DIASTOLIC BLOOD PRESSURE: 60 MMHG | WEIGHT: 107.4 LBS | HEIGHT: 63 IN | SYSTOLIC BLOOD PRESSURE: 112 MMHG

## 2021-09-21 DIAGNOSIS — N84.0 ENDOMETRIAL POLYP: ICD-10-CM

## 2021-09-21 DIAGNOSIS — Z48.89 POSTOPERATIVE VISIT: Primary | ICD-10-CM

## 2021-09-21 PROCEDURE — 99024 POSTOP FOLLOW-UP VISIT: CPT | Performed by: OBSTETRICS & GYNECOLOGY

## 2021-09-21 RX ORDER — TRETINOIN 0.5 MG/G
CREAM TOPICAL
COMMUNITY
Start: 2021-09-13 | End: 2021-11-02 | Stop reason: ALTCHOICE

## 2021-09-21 NOTE — PROGRESS NOTES
Assessment   Michelle Hunt was seen today for post-op  Diagnoses and all orders for this visit:    Postoperative visit    Endometrial polyp        Doing well postoperatively  Plan    1  Continue any current medications  2  Activity restrictions: none  3  F/U in April for annual exam          Sita Helton is a 48 y o  y o  female who presents to the clinic 3 weeks status post D&C with hysteroscopy and resection of uterine pathology for Suspected polyp on ultrasound  Eating a regular diet without difficulty  Bowel movements are normal  The patient is not having any pain  Operative findings, photos and pathology discussed with patient  The following portions of the patient's history were reviewed and updated as appropriate: allergies, current medications, past family history, past medical history, past social history, past surgical history and problem list       Patient has no known allergies  Current Outpatient Medications:     Multiple Vitamins-Minerals (Multi-Vitamin Gummies) CHEW, Chew daily Juice's multivitamin, Disp: , Rfl:     sertraline (ZOLOFT) 25 mg tablet, Take 1 tablet (25 mg total) by mouth daily, Disp: 30 tablet, Rfl: 5    tretinoin (REFISSA) 0 05 % cream, APPLY TO AFFECTED AREA NIGHTLY, Disp: , Rfl:     Tretinoin, Facial Wrinkles, (Tretinoin, Emollient,) 0 05 % CREA, APPLY TO AFFECTED AREA NIGHTLY, Disp: 40 g, Rfl: 2      Review of Systems  Review of Systems   Constitutional: Negative for chills and fever  Respiratory: Negative for chest tightness and shortness of breath  Gastrointestinal: Negative for abdominal distention, abdominal pain, nausea and vomiting  Genitourinary: Negative for difficulty urinating and vaginal bleeding  Objective   /60   Ht 5' 3" (1 6 m)   Wt 48 7 kg (107 lb 6 4 oz)   LMP 08/29/2021 (LMP Unknown)   BMI 19 03 kg/m²     Physical Exam  Constitutional:       General: She is not in acute distress       Appearance: Normal appearance  She is well-developed  She is not ill-appearing  Pulmonary:      Effort: Pulmonary effort is normal  No respiratory distress  Neurological:      General: No focal deficit present  Mental Status: She is alert and oriented to person, place, and time  Psychiatric:         Mood and Affect: Mood normal          Behavior: Behavior normal          Thought Content: Thought content normal          Judgment: Judgment normal    Vitals and nursing note reviewed

## 2021-09-23 ENCOUNTER — COSMETIC (OUTPATIENT)
Dept: PLASTIC SURGERY | Facility: CLINIC | Age: 46
End: 2021-09-23

## 2021-09-23 VITALS — BODY MASS INDEX: 18.96 KG/M2 | TEMPERATURE: 97.5 F | WEIGHT: 107 LBS | HEIGHT: 63 IN

## 2021-09-23 DIAGNOSIS — Z41.1 ENCOUNTER FOR COSMETIC PROCEDURE: ICD-10-CM

## 2021-09-23 PROCEDURE — 3008F BODY MASS INDEX DOCD: CPT | Performed by: OBSTETRICS & GYNECOLOGY

## 2021-09-23 PROCEDURE — RECHECK: Performed by: STUDENT IN AN ORGANIZED HEALTH CARE EDUCATION/TRAINING PROGRAM

## 2021-09-23 PROCEDURE — BOTOX1U PR BOTOX BY THE UNIT: Performed by: STUDENT IN AN ORGANIZED HEALTH CARE EDUCATION/TRAINING PROGRAM

## 2021-09-23 NOTE — PROGRESS NOTES
Botox Consult     First time?:  No, had botox and filler by Dr Ammy Smith, referred to me  Allergies: none  Blood thinners: none   Pregnant: none     Interested in lateral brow lift, glabellar, and forehead wrinkles  Patient also interested in facial fillers in nasolabial fold, but informed that we are holding performing facial injections with lowered mask due to covid breakthrough cases  Will proceed conservatively with 20 units of botox     Risks and benefits discussed, patient agreed to proceed       4 units to each crows feet  6 units to corrugators  6 units to forehead     Total 20 units, 20% off     Patient tolerated well, f/u in 3 months        Igor Pinto MD   [facility]   [address], [address], [address]   Office: [phone]

## 2021-10-22 ENCOUNTER — TELEPHONE (OUTPATIENT)
Dept: FAMILY MEDICINE CLINIC | Facility: CLINIC | Age: 46
End: 2021-10-22

## 2021-11-02 ENCOUNTER — OFFICE VISIT (OUTPATIENT)
Dept: FAMILY MEDICINE CLINIC | Facility: CLINIC | Age: 46
End: 2021-11-02
Payer: COMMERCIAL

## 2021-11-02 VITALS
RESPIRATION RATE: 16 BRPM | SYSTOLIC BLOOD PRESSURE: 100 MMHG | WEIGHT: 107.8 LBS | HEIGHT: 63 IN | BODY MASS INDEX: 19.1 KG/M2 | DIASTOLIC BLOOD PRESSURE: 66 MMHG | OXYGEN SATURATION: 100 % | TEMPERATURE: 97.5 F | HEART RATE: 73 BPM

## 2021-11-02 DIAGNOSIS — G47.00 INSOMNIA, UNSPECIFIED TYPE: Primary | ICD-10-CM

## 2021-11-02 PROCEDURE — 1036F TOBACCO NON-USER: CPT | Performed by: FAMILY MEDICINE

## 2021-11-02 PROCEDURE — 3725F SCREEN DEPRESSION PERFORMED: CPT | Performed by: FAMILY MEDICINE

## 2021-11-02 PROCEDURE — 3008F BODY MASS INDEX DOCD: CPT | Performed by: FAMILY MEDICINE

## 2021-11-02 PROCEDURE — 99214 OFFICE O/P EST MOD 30 MIN: CPT | Performed by: FAMILY MEDICINE

## 2021-11-02 RX ORDER — TRAZODONE HYDROCHLORIDE 50 MG/1
25-50 TABLET ORAL
Qty: 30 TABLET | Refills: 1 | Status: SHIPPED | OUTPATIENT
Start: 2021-11-02

## 2021-12-07 ENCOUNTER — TELEPHONE (OUTPATIENT)
Dept: FAMILY MEDICINE CLINIC | Facility: CLINIC | Age: 46
End: 2021-12-07

## 2021-12-07 ENCOUNTER — TELEMEDICINE (OUTPATIENT)
Dept: FAMILY MEDICINE CLINIC | Facility: CLINIC | Age: 46
End: 2021-12-07
Payer: COMMERCIAL

## 2021-12-07 VITALS — HEIGHT: 63 IN | BODY MASS INDEX: 18.96 KG/M2 | WEIGHT: 107 LBS

## 2021-12-07 DIAGNOSIS — J01.00 ACUTE NON-RECURRENT MAXILLARY SINUSITIS: Primary | ICD-10-CM

## 2021-12-07 PROCEDURE — 99213 OFFICE O/P EST LOW 20 MIN: CPT | Performed by: FAMILY MEDICINE

## 2021-12-07 PROCEDURE — 3008F BODY MASS INDEX DOCD: CPT | Performed by: FAMILY MEDICINE

## 2021-12-07 PROCEDURE — 1036F TOBACCO NON-USER: CPT | Performed by: FAMILY MEDICINE

## 2021-12-07 RX ORDER — AMOXICILLIN 875 MG/1
875 TABLET, COATED ORAL 2 TIMES DAILY
Qty: 20 TABLET | Refills: 0 | Status: SHIPPED | OUTPATIENT
Start: 2021-12-07 | End: 2021-12-17

## 2022-01-04 ENCOUNTER — CLINICAL SUPPORT (OUTPATIENT)
Dept: OBGYN CLINIC | Facility: CLINIC | Age: 47
End: 2022-01-04
Payer: COMMERCIAL

## 2022-01-04 VITALS — BODY MASS INDEX: 18.67 KG/M2 | SYSTOLIC BLOOD PRESSURE: 108 MMHG | DIASTOLIC BLOOD PRESSURE: 64 MMHG | WEIGHT: 105.4 LBS

## 2022-01-04 DIAGNOSIS — Z23 NEED FOR HPV VACCINATION: Primary | ICD-10-CM

## 2022-01-04 PROCEDURE — 90471 IMMUNIZATION ADMIN: CPT

## 2022-01-04 PROCEDURE — 90651 9VHPV VACCINE 2/3 DOSE IM: CPT

## 2022-01-04 NOTE — PROGRESS NOTES
Pt presents for last gardasil injection  Pt has no problems  Gardasil given in left deltoid without difficulty, pt tolerated well

## 2022-01-15 ENCOUNTER — IMMUNIZATIONS (OUTPATIENT)
Dept: FAMILY MEDICINE CLINIC | Facility: HOSPITAL | Age: 47
End: 2022-01-15

## 2022-01-15 DIAGNOSIS — Z23 ENCOUNTER FOR IMMUNIZATION: Primary | ICD-10-CM

## 2022-01-15 PROCEDURE — 91300 COVID-19 PFIZER VACC 0.3 ML: CPT

## 2022-01-15 PROCEDURE — 0001A COVID-19 PFIZER VACC 0.3 ML: CPT

## 2022-07-07 NOTE — ASSESSMENT & PLAN NOTE
Issue is sleep maintenance  Her sleep hygiene seems adequate  - Discussed Z drugs that may help with sleep maintenance    - Pt will continue trying melatonin and see if CBT will improve her sleep first  no

## 2022-09-14 DIAGNOSIS — Z12.11 SCREEN FOR COLON CANCER: Primary | ICD-10-CM

## 2022-09-28 ENCOUNTER — HOSPITAL ENCOUNTER (OUTPATIENT)
Dept: RADIOLOGY | Facility: MEDICAL CENTER | Age: 47
Discharge: HOME | End: 2022-09-28
Payer: COMMERCIAL

## 2022-09-28 VITALS — HEIGHT: 63 IN | BODY MASS INDEX: 18.68 KG/M2 | WEIGHT: 105.4 LBS

## 2022-09-28 DIAGNOSIS — Z12.31 ENCOUNTER FOR SCREENING MAMMOGRAM FOR MALIGNANT NEOPLASM OF BREAST: ICD-10-CM

## 2022-09-28 PROCEDURE — 77067 SCR MAMMO BI INCL CAD: CPT

## 2022-09-28 PROCEDURE — 77063 BREAST TOMOSYNTHESIS BI: CPT

## 2022-10-11 ENCOUNTER — ANNUAL EXAM (OUTPATIENT)
Dept: OBGYN CLINIC | Facility: CLINIC | Age: 47
End: 2022-10-11
Payer: COMMERCIAL

## 2022-10-11 VITALS
DIASTOLIC BLOOD PRESSURE: 72 MMHG | WEIGHT: 108 LBS | SYSTOLIC BLOOD PRESSURE: 110 MMHG | HEIGHT: 63 IN | BODY MASS INDEX: 19.14 KG/M2

## 2022-10-11 DIAGNOSIS — Z01.419 WELL WOMAN EXAM WITH ROUTINE GYNECOLOGICAL EXAM: Primary | ICD-10-CM

## 2022-10-11 DIAGNOSIS — Z12.31 ENCOUNTER FOR SCREENING MAMMOGRAM FOR MALIGNANT NEOPLASM OF BREAST: ICD-10-CM

## 2022-10-11 DIAGNOSIS — N92.5 OTHER SPECIFIED IRREGULAR MENSTRUATION: ICD-10-CM

## 2022-10-11 DIAGNOSIS — Z11.51 SCREENING FOR HPV (HUMAN PAPILLOMAVIRUS): ICD-10-CM

## 2022-10-11 DIAGNOSIS — Z12.4 ENCOUNTER FOR SCREENING FOR MALIGNANT NEOPLASM OF CERVIX: ICD-10-CM

## 2022-10-11 PROCEDURE — 99396 PREV VISIT EST AGE 40-64: CPT | Performed by: OBSTETRICS & GYNECOLOGY

## 2022-10-11 PROCEDURE — G0476 HPV COMBO ASSAY CA SCREEN: HCPCS | Performed by: OBSTETRICS & GYNECOLOGY

## 2022-10-11 PROCEDURE — G0124 SCREEN C/V THIN LAYER BY MD: HCPCS | Performed by: PATHOLOGY

## 2022-10-11 PROCEDURE — G0145 SCR C/V CYTO,THINLAYER,RESCR: HCPCS | Performed by: PATHOLOGY

## 2022-10-11 RX ORDER — NORETHINDRONE ACETATE AND ETHINYL ESTRADIOL 1MG-20(21)
1 KIT ORAL DAILY
Qty: 84 TABLET | Refills: 3 | Status: SHIPPED | OUTPATIENT
Start: 2022-10-11 | End: 2022-10-26

## 2022-10-11 NOTE — PROGRESS NOTES
ASSESSMENT & PLAN: Lucy Montano is a 50 y o  [id number] with normal gynecologic exam     1   Routine well woman exam done today  2    Pap and HPV:Pap with HPV was done today  Current ASCCP Guidelines reviewed  Last Pap  [unfilled] :  NILM, Other HPV positive  3  Mammogram ordered  Recommend yearly mammography  4   The patient declined STD testing  5  The patient is sexually active  6  The following were reviewed in today's visit: breast self exam, use and side effects of OCPs and AUB  7  Patient to return to office in 12 months for WWE  8   AUB- Hx of endometrial polyp with hysteroscopic resection in 2021  Pelvic u/s ordered  Discussed treatment options for prolonged cycles  Reviewed hormonal vs  Endometrial ablation  Pt  Is interested in trial of OCP  Reviewed potential risks/ benefit/ alternatives  She appears to understand risk for dvt/ pe, and CVA  Declines IUD  Encouraged to call with any questions/ concerns  All questions have been answered to her satisfaction  CC:  Annual Gynecologic Examination    HPI: Lucy Montano is a 50 y o  [id number] who presents for annual gynecologic examination  She has the following concerns:  Prolonged menstrual cycle  States cycles have been like this for past few years  Last year had hysteroscopic endometrial polypectomy with Dr Kirit Moya  States bleeding did not change  Has menses q3 weeks and lasts 10-12 days  States first 10 days or so are light, then heavier days at the end  Health Maintenance:    She wears her seatbelt routinely  She does perform regular monthly self breast exams  She feels safe at home       Past Medical History:   Diagnosis Date   • Otitis media 11/2019   • Varicella        Past Surgical History:   Procedure Laterality Date   • NO PAST SURGERIES     • OR HYSTEROSCOPY,W/ENDO BX N/A 9/3/2021    Procedure: DILATATION AND CURETTAGE (D&C) WITH HYSTEROSCOPY (;  Surgeon: Nataly Burns DO;  Location: [facility];  Service: Gynecology   • OH HYSTEROSCOPY,W/ENDO BX N/A 9/3/2021    Procedure: resection to uterine pathology;  Surgeon: Reno Palumbo DO;  [facility];  Service: Gynecology       Past OB/Gyn History:  Period Cycle (Days): 21  Period Duration (Days): 10-12  Period Pattern: (!) Irregular  Menstrual Flow: Light, Heavy  Menstrual Control: Maxi pad, TamponPatient's last menstrual period was 10/02/2022 (exact date)  Menstrual History:  OB History        2    Para   2    Term   2            AB        Living   2       SAB        IAB        Ectopic        Multiple        Live Births   2                  Patient's last menstrual period was 10/02/2022 (exact date)  Period Cycle (Days): 21  Period Duration (Days): 10-12  Period Pattern: (!) Irregular  Menstrual Flow: Light, Heavy  Menstrual Control: Maxi pad, Tampon    History of sexually transmitted infection No  Patient is currently sexually active  heterosexual Birth control: none  Partner had a vasectomy  Last Pap  [unfilled] :  no abnormalities      Family History   Problem Relation Age of Onset   • Osteoarthritis Mother    • Hyperlipidemia Mother    • No Known Problems Father    • Asthma Brother    • Heart attack Maternal Grandfather 48   • Vaginal cancer Maternal Aunt         unknown age   • No Known Problems Sister    • No Known Problems Paternal Aunt    • Hypertension Neg Hx    • Coronary artery disease Neg Hx    • Diabetes Neg Hx    • Stroke Neg Hx    • Cancer Neg Hx    • Sudden death Neg Hx         SCD       Social History:  Social History     Socioeconomic History   • Marital status: /Civil Union     Spouse name: Not on file   • Number of children: 2   • Years of education: Not on file   • Highest education level: Not on file   Occupational History   • Not on file   Tobacco Use   • Smoking status: Never Smoker   • Smokeless tobacco: Never Used   Vaping Use   • Vaping Use: Never used   Substance and Sexual Activity   • Alcohol use: Yes     Comment: ocassional, social   • Drug use: No   • Sexual activity: Yes     Partners: Male     Birth control/protection: Condom Male, Male Sterilization     Comment: Vasectomy   Other Topics Concern   • Not on file   Social History Narrative    Exercise habits:     Social Determinants of Health     Financial Resource Strain: Not on file   Food Insecurity: Not on file   Transportation Needs: Not on file   Physical Activity: Not on file   Stress: Not on file   Social Connections: Not on file   Intimate Partner Violence: Not on file   Housing Stability: Not on file       Patient is currently employed [employer]  No Known Allergies    Current Outpatient Medications:   •  Multiple Vitamins-Minerals (Multi-Vitamin Gummies) CHEW, Chew daily Juice's multivitamin, Disp: , Rfl:   •  norethindrone-ethinyl estradiol (Junel FE 1/20) 1-20 MG-MCG per tablet, Take 1 tablet by mouth daily, Disp: 84 tablet, Rfl: 3  •  Tretinoin, Facial Wrinkles, (Tretinoin, Emollient,) 0 05 % CREA, APPLY TO AFFECTED AREA NIGHTLY, Disp: 40 g, Rfl: 2    Review of Systems:  Review of Systems   Constitutional: Negative for activity change, chills, fever and unexpected weight change  HENT: Negative for congestion, ear pain, hearing loss and sore throat  Respiratory: Negative for cough, chest tightness and shortness of breath  Cardiovascular: Negative for chest pain and leg swelling  Gastrointestinal: Negative for abdominal pain, constipation, diarrhea, nausea and vomiting  Genitourinary: Positive for menstrual problem and vaginal bleeding  Negative for difficulty urinating, dysuria, frequency, pelvic pain, vaginal discharge and vaginal pain  Skin: Negative for color change and rash  Neurological: Negative for dizziness, numbness and headaches  Psychiatric/Behavioral: Negative for agitation and confusion         Physical Exam:  /72 (BP Location: Left arm, Patient Position: Sitting, Cuff Size: Standard) Ht 5' 3" (1 6 m)   Wt 49 kg (108 lb)   LMP 10/02/2022 (Exact Date)   Breastfeeding No   BMI 19 13 kg/m²    Physical Exam  Constitutional:       General: She is not in acute distress  Appearance: Normal appearance  She is normal weight  Genitourinary:      Vulva, bladder, rectum and urethral meatus normal       No lesions in the vagina  Right Labia: No rash, tenderness or lesions  Left Labia: No tenderness, lesions or rash  No labial fusion noted  No vaginal discharge or tenderness  No vaginal prolapse present  No vaginal atrophy present  Right Adnexa: not tender, not full and no mass present  Left Adnexa: not tender, not full and no mass present  Cervix is parous  No cervical motion tenderness or friability  Uterus is not enlarged or prolapsed  Breasts:      Breasts are soft  Right: No inverted nipple, mass, nipple discharge or skin change  Left: No inverted nipple, mass, nipple discharge or skin change  HENT:      Head: Normocephalic and atraumatic  Nose: Nose normal    Eyes:      Conjunctiva/sclera: Conjunctivae normal       Pupils: Pupils are equal, round, and reactive to light  Cardiovascular:      Rate and Rhythm: Normal rate and regular rhythm  Pulses: Normal pulses  Heart sounds: Normal heart sounds  Pulmonary:      Effort: Pulmonary effort is normal  No respiratory distress  Breath sounds: Normal breath sounds  No wheezing  Abdominal:      General: Abdomen is flat  There is no distension  Palpations: Abdomen is soft  Tenderness: There is no abdominal tenderness  There is no guarding  Musculoskeletal:         General: Normal range of motion  Cervical back: Normal range of motion and neck supple  Neurological:      General: No focal deficit present  Mental Status: She is alert and oriented to person, place, and time  Mental status is at baseline     Skin:     General: Skin is warm and dry  Psychiatric:         Mood and Affect: Mood normal          Behavior: Behavior normal          Thought Content: Thought content normal          Judgment: Judgment normal    Vitals and nursing note reviewed

## 2022-10-13 ENCOUNTER — TELEPHONE (OUTPATIENT)
Dept: OBGYN CLINIC | Facility: CLINIC | Age: 47
End: 2022-10-13

## 2022-10-13 LAB
HPV HR 12 DNA CVX QL NAA+PROBE: NEGATIVE
HPV16 DNA CVX QL NAA+PROBE: NEGATIVE
HPV18 DNA CVX QL NAA+PROBE: NEGATIVE

## 2022-10-13 NOTE — TELEPHONE ENCOUNTER
Patient called  You sent her June to the [facility]  They don't have the prescription in stock   Can you please send to the [facility] on [address]

## 2022-10-14 DIAGNOSIS — N92.5 OTHER SPECIFIED IRREGULAR MENSTRUATION: Primary | ICD-10-CM

## 2022-10-14 RX ORDER — NORETHINDRONE ACETATE AND ETHINYL ESTRADIOL 1MG-20(21)
1 KIT ORAL DAILY
Qty: 84 TABLET | Refills: 3 | Status: SHIPPED | OUTPATIENT
Start: 2022-10-14 | End: 2023-01-06

## 2022-10-18 ENCOUNTER — HOSPITAL ENCOUNTER (OUTPATIENT)
Dept: ULTRASOUND IMAGING | Facility: HOSPITAL | Age: 47
Discharge: HOME | End: 2022-10-18
Attending: OBSTETRICS & GYNECOLOGY
Payer: COMMERCIAL

## 2022-10-18 DIAGNOSIS — N92.5 OTHER SPECIFIED IRREGULAR MENSTRUATION: ICD-10-CM

## 2022-10-18 PROCEDURE — 76856 US EXAM PELVIC COMPLETE: CPT

## 2022-10-18 PROCEDURE — 76830 TRANSVAGINAL US NON-OB: CPT

## 2022-10-19 LAB
LAB AP GYN PRIMARY INTERPRETATION: NORMAL
Lab: NORMAL
PATH INTERP SPEC-IMP: NORMAL

## 2022-10-26 ENCOUNTER — OFFICE VISIT (OUTPATIENT)
Dept: FAMILY MEDICINE CLINIC | Facility: CLINIC | Age: 47
End: 2022-10-26
Payer: COMMERCIAL

## 2022-10-26 VITALS
HEART RATE: 82 BPM | TEMPERATURE: 97.9 F | WEIGHT: 108 LBS | HEIGHT: 63 IN | DIASTOLIC BLOOD PRESSURE: 68 MMHG | BODY MASS INDEX: 19.14 KG/M2 | OXYGEN SATURATION: 99 % | SYSTOLIC BLOOD PRESSURE: 102 MMHG

## 2022-10-26 DIAGNOSIS — M54.50 CHRONIC LOW BACK PAIN WITHOUT SCIATICA, UNSPECIFIED BACK PAIN LATERALITY: ICD-10-CM

## 2022-10-26 DIAGNOSIS — Z00.00 ANNUAL PHYSICAL EXAM: Primary | ICD-10-CM

## 2022-10-26 DIAGNOSIS — Z23 ENCOUNTER FOR IMMUNIZATION: ICD-10-CM

## 2022-10-26 DIAGNOSIS — Z12.11 SCREEN FOR COLON CANCER: ICD-10-CM

## 2022-10-26 DIAGNOSIS — R79.89 ABNORMAL TSH: ICD-10-CM

## 2022-10-26 DIAGNOSIS — G89.29 CHRONIC LOW BACK PAIN WITHOUT SCIATICA, UNSPECIFIED BACK PAIN LATERALITY: ICD-10-CM

## 2022-10-26 PROCEDURE — 99396 PREV VISIT EST AGE 40-64: CPT | Performed by: FAMILY MEDICINE

## 2022-10-26 PROCEDURE — 90715 TDAP VACCINE 7 YRS/> IM: CPT

## 2022-10-26 PROCEDURE — 90472 IMMUNIZATION ADMIN EACH ADD: CPT

## 2022-10-26 PROCEDURE — 90471 IMMUNIZATION ADMIN: CPT

## 2022-10-26 PROCEDURE — 90686 IIV4 VACC NO PRSV 0.5 ML IM: CPT

## 2022-10-26 NOTE — PROGRESS NOTES
Assessment/Plan:       Problem List Items Addressed This Visit    None     Visit Diagnoses     Annual physical exam    -  Primary- Reviewed health maintenance/preventive care  Discussed healthy diet and exercise/activity  Reviewed appropriate vaccinations- flu and Tdap today  Screening labwork ordered as noted  Colonoscopy ordered- encouraged to call if she does not receive a call to schedule  Mammogram utd  Relevant Orders    Comprehensive metabolic panel    Lipid Panel with Direct LDL reflex    TSH, 3rd generation with Free T4 reflex    Hemoglobin A1C    CBC and differential    Screen for colon cancer        Relevant Orders    Ambulatory referral for colonoscopy    Encounter for immunization        Relevant Orders    influenza vaccine, quadrivalent, 0 5 mL, preservative-free, for adult and pediatric patients 6 mos+ (AFLURIA, FLUARIX, FLULAVAL, FLUZONE) (Completed)    TDAP VACCINE GREATER THAN OR EQUAL TO 6YO IM (Completed)    Abnormal TSH        check w/ labs  TSH elevated in 2018, normalized after that, hasn't been repeated recently  Relevant Orders    TSH, 3rd generation with Free T4 reflex   Refer to PT for the lateral low back pain  She will let me know if this is not helping  Subjective:     Jessika Beyer is a 50 y o  female here today with chief complaint below:  Chief Complaint   Patient presents with   • Physical Exam     - CC above per clinical staff and reviewed  HPI:  Pt is here for annual PE  She is generally feeling well  She has had pap recently  Diet is healthy overall  Keeps active  Due for labs  Notes she is having pain in her lower lateral back/hip area  Nothing makes it better or worse  Has been present for about five months  Not worse or better over time  Driving for 1-2 hours and then getting out of the car will make it more noticeable    No numbness/tingling  No trauma    Some anxiety, this is manageable for her  Doesn't desire meds/treatment at this point       PHQ-2/9 Depression Screening    Little interest or pleasure in doing things: 1 - several days  Feeling down, depressed, or hopeless: 1 - several days  Trouble falling or staying asleep, or sleeping too much: 1 - several days  Feeling tired or having little energy: 0 - not at all  Poor appetite or overeatin - not at all  Feeling bad about yourself - or that you are a failure or have let yourself or your family down: 0 - not at all  Trouble concentrating on things, such as reading the newspaper or watching television: 0 - not at all  Moving or speaking so slowly that other people could have noticed  Or the opposite - being so fidgety or restless that you have been moving around a lot more than usual: 0 - not at all  Thoughts that you would be better off dead, or of hurting yourself in some way: 0 - not at all  PHQ-2 Score: 2  PHQ-2 Interpretation: Negative depression screen  PHQ-9 Score: 3   PHQ-9 Interpretation: No or Minimal depression        JAZMIN-7 Flowsheet Screening    Flowsheet Row Most Recent Value   Over the last 2 weeks, how often have you been bothered by any of the following problems? Feeling nervous, anxious, or on edge 1   Not being able to stop or control worrying 1   Worrying too much about different things 1   Trouble relaxing 1   Being so restless that it is hard to sit still 1   Becoming easily annoyed or irritable 1   Feeling afraid as if something awful might happen 1   JAZMIN-7 Total Score 7            The following portions of the patient's history were reviewed and updated as appropriate: allergies, current medications, past family history, past medical history, past social history, past surgical history and problem list     ROS:  Review of Systems   No CP or SOB  No changes in bowel habits  Does sometimes notice some mucous after passing a bowel movement, occ notes a little bit of blood streak in the mucous  This is x years        Objective:      /68 (BP Location: Left arm, Patient Position: Sitting, Cuff Size: Standard)   Pulse 82   Temp 97 9 °F (36 6 °C) (Temporal)   Ht 5' 3" (1 6 m)   Wt 49 kg (108 lb)   LMP 10/02/2022 (Exact Date)   SpO2 99%   BMI 19 13 kg/m²   BP Readings from Last 3 Encounters:   10/26/22 102/68   10/11/22 110/72   01/04/22 108/64     Wt Readings from Last 3 Encounters:   10/26/22 49 kg (108 lb)   10/11/22 49 kg (108 lb)   09/28/22 47 8 kg (105 lb 6 4 oz)               Physical Exam:   Physical Exam  Vitals and nursing note reviewed  Constitutional:       General: She is not in acute distress  Appearance: Normal appearance  She is well-developed  She is not ill-appearing  HENT:      Head: Normocephalic and atraumatic  Eyes:      Conjunctiva/sclera: Conjunctivae normal    Neck:      Vascular: No carotid bruit  Cardiovascular:      Rate and Rhythm: Normal rate and regular rhythm  Heart sounds: Normal heart sounds  No murmur heard  Pulmonary:      Effort: Pulmonary effort is normal  No respiratory distress  Breath sounds: Normal breath sounds  No wheezing  Abdominal:      Palpations: Abdomen is soft  Tenderness: There is no abdominal tenderness  There is no guarding or rebound  Musculoskeletal:      Cervical back: Neck supple  Right lower leg: No edema  Left lower leg: No edema  Comments: Mild tenderness R low lateral back, feels improvement in pain with pressure/palpation  Normal ROM without pain of the R hip  Normal gait  Lymphadenopathy:      Cervical: No cervical adenopathy  Skin:     General: Skin is warm and dry  Neurological:      Mental Status: She is alert and oriented to person, place, and time  Psychiatric:         Mood and Affect: Mood normal          Behavior: Behavior normal            Depression Screening and Follow-up Plan: Patient was screened for depression during today's encounter  They screened negative with a PHQ-2 score of 2

## 2022-10-28 ENCOUNTER — APPOINTMENT (OUTPATIENT)
Dept: LAB | Facility: AMBULARY SURGERY CENTER | Age: 47
End: 2022-10-28
Payer: COMMERCIAL

## 2022-10-28 DIAGNOSIS — R79.89 ABNORMAL TSH: ICD-10-CM

## 2022-10-28 DIAGNOSIS — Z00.00 ANNUAL PHYSICAL EXAM: ICD-10-CM

## 2022-10-28 LAB
ALBUMIN SERPL BCP-MCNC: 3.5 G/DL (ref 3.5–5)
ALP SERPL-CCNC: 36 U/L (ref 46–116)
ALT SERPL W P-5'-P-CCNC: 17 U/L (ref 12–78)
ANION GAP SERPL CALCULATED.3IONS-SCNC: 9 MMOL/L (ref 4–13)
AST SERPL W P-5'-P-CCNC: 12 U/L (ref 5–45)
BASOPHILS # BLD AUTO: 0.06 THOUSANDS/ÂΜL (ref 0–0.1)
BASOPHILS NFR BLD AUTO: 1 % (ref 0–1)
BILIRUB SERPL-MCNC: 0.5 MG/DL (ref 0.2–1)
BUN SERPL-MCNC: 8 MG/DL (ref 5–25)
CALCIUM SERPL-MCNC: 9.1 MG/DL (ref 8.3–10.1)
CHLORIDE SERPL-SCNC: 109 MMOL/L (ref 96–108)
CHOLEST SERPL-MCNC: 165 MG/DL
CO2 SERPL-SCNC: 20 MMOL/L (ref 21–32)
CREAT SERPL-MCNC: 0.99 MG/DL (ref 0.6–1.3)
EOSINOPHIL # BLD AUTO: 0.13 THOUSAND/ÂΜL (ref 0–0.61)
EOSINOPHIL NFR BLD AUTO: 2 % (ref 0–6)
ERYTHROCYTE [DISTWIDTH] IN BLOOD BY AUTOMATED COUNT: 12.8 % (ref 11.6–15.1)
EST. AVERAGE GLUCOSE BLD GHB EST-MCNC: 100 MG/DL
GFR SERPL CREATININE-BSD FRML MDRD: 67 ML/MIN/1.73SQ M
GLUCOSE P FAST SERPL-MCNC: 91 MG/DL (ref 65–99)
HBA1C MFR BLD: 5.1 %
HCT VFR BLD AUTO: 37.2 % (ref 34.8–46.1)
HDLC SERPL-MCNC: 58 MG/DL
HGB BLD-MCNC: 11.6 G/DL (ref 11.5–15.4)
IMM GRANULOCYTES # BLD AUTO: 0.02 THOUSAND/UL (ref 0–0.2)
IMM GRANULOCYTES NFR BLD AUTO: 0 % (ref 0–2)
LDLC SERPL CALC-MCNC: 88 MG/DL (ref 0–100)
LYMPHOCYTES # BLD AUTO: 1.19 THOUSANDS/ÂΜL (ref 0.6–4.47)
LYMPHOCYTES NFR BLD AUTO: 20 % (ref 14–44)
MCH RBC QN AUTO: 29.6 PG (ref 26.8–34.3)
MCHC RBC AUTO-ENTMCNC: 31.2 G/DL (ref 31.4–37.4)
MCV RBC AUTO: 95 FL (ref 82–98)
MONOCYTES # BLD AUTO: 0.7 THOUSAND/ÂΜL (ref 0.17–1.22)
MONOCYTES NFR BLD AUTO: 12 % (ref 4–12)
NEUTROPHILS # BLD AUTO: 4.01 THOUSANDS/ÂΜL (ref 1.85–7.62)
NEUTS SEG NFR BLD AUTO: 65 % (ref 43–75)
NRBC BLD AUTO-RTO: 0 /100 WBCS
PLATELET # BLD AUTO: 255 THOUSANDS/UL (ref 149–390)
PMV BLD AUTO: 9.5 FL (ref 8.9–12.7)
POTASSIUM SERPL-SCNC: 4 MMOL/L (ref 3.5–5.3)
PROT SERPL-MCNC: 7.4 G/DL (ref 6.4–8.4)
RBC # BLD AUTO: 3.92 MILLION/UL (ref 3.81–5.12)
SODIUM SERPL-SCNC: 138 MMOL/L (ref 135–147)
TRIGL SERPL-MCNC: 93 MG/DL
TSH SERPL DL<=0.05 MIU/L-ACNC: 2.94 UIU/ML (ref 0.45–4.5)
WBC # BLD AUTO: 6.11 THOUSAND/UL (ref 4.31–10.16)

## 2022-10-28 PROCEDURE — 80053 COMPREHEN METABOLIC PANEL: CPT

## 2022-10-28 PROCEDURE — 36415 COLL VENOUS BLD VENIPUNCTURE: CPT

## 2022-10-28 PROCEDURE — 85025 COMPLETE CBC W/AUTO DIFF WBC: CPT

## 2022-10-28 PROCEDURE — 80061 LIPID PANEL: CPT

## 2022-10-28 PROCEDURE — 84443 ASSAY THYROID STIM HORMONE: CPT

## 2022-10-28 PROCEDURE — 83036 HEMOGLOBIN GLYCOSYLATED A1C: CPT

## 2022-11-17 ENCOUNTER — COSMETIC (OUTPATIENT)
Dept: DERMATOLOGY | Facility: CLINIC | Age: 47
End: 2022-11-17

## 2022-11-17 VITALS — TEMPERATURE: 97.3 F | WEIGHT: 107.8 LBS | BODY MASS INDEX: 19.1 KG/M2 | HEIGHT: 63 IN

## 2022-11-17 DIAGNOSIS — Z09 AFTERCARE INVOLVING THE USE OF PLASTIC SURGERY: ICD-10-CM

## 2022-11-17 DIAGNOSIS — Z41.1 ENCOUNTER FOR COSMETIC PROCEDURE: Primary | ICD-10-CM

## 2022-11-17 RX ORDER — TRETINOIN 0.5 MG/G
CREAM TOPICAL
Qty: 40 G | Refills: 2 | Status: SHIPPED | OUTPATIENT
Start: 2022-11-17 | End: 2022-11-18

## 2022-11-17 NOTE — PATIENT INSTRUCTIONS
SKIN CARE RECOMMENDATIONS     Morning    Wash your face with a BHA/AHA cleanser  Some examples are listed below but get one that works for you  Avoid ones with harsh scrubbing beads   *SkinFix Acne Cleanser (Bonny)   Skin Medica AHA/BHA cleanser Exfoliating Cleanser      Apply Vitamin C  Look for "Ascorbic acid" as an ingredient  This is the most effective form of Vitamin C  Some examples are listed below but get one that works for you  L'Oreal Revitalift 10% Pure Vitamin C   Cerave Skin Renewing Vitamin C Serum   Kiehl's Powerful Strength Line-Reducing Concentrate with Vitamin C  Shakila's Choice C15 Super Booster   SkinCeuticals Phloretin CF   ISDINs Vitamin C ampules (Flavo-C)    Apply sunscreen with zinc or titanium dioxide (check the ingredient list)  Some examples are listed below but get one that works for you and that you will use daily  It is ok to use a different sunscreen for the body if you have on already  La-Roche Posay Antihelios 50 Mineral    Elta MD UV Clear Broad Spectrum SPF 55  ISDIN Eryfotona Ageless Broad Spectrum SPF 50  Krave Beauty The Beet Shield   Unsun Mineral Tinted Broad Spectrum Face Sunscreen   Skinceuticals Physical Matte UV Defense SPF 48  First Aid Beauty Weightless Liquid Mineral Sunscreen with Zinc Oxide   Colorscience Sunforgettable Total Protection Face Shield SPF 48  Beautycounter Countersun Mineral Sunscreen Lotion     Follow with make-up as needed       [employer] your face with a gentle cleanser or just splash with water  Moisturize your face with Cerave or Cetaphil creasm or Neutrogena Hydroboost for ExtraDry Skin  Next, alternate the following:   Mon, Wed, Friday: Apply pea to chick pea sized amount to the face and neck  It is ok to apply moisturizer on top of it as needed  On opposite days, start usign lightening cream  We will send this to you via [facility]    [facility]     We use this service to prescribe medications that are often not covered by insurance  Your physician will send your prescription to the pharmacy  You will receive an email from [email or web address] where you will follow the instructions within the email to provide your billing and shipping information  Your medicine will be shipped directly to you  If you have any questions, you can call [phone] or email [email or web address]

## 2022-11-17 NOTE — PROGRESS NOTES
[facility] [place] [address] [facility]     Patient Name: Saima Ash  Encounter Date: 11/17/22     Have you been cared for by a [place] [address] Dermatologist in the last 3 years and, if so, which description applies to you? Yes  I have been here within the last 3 years, and my medical history has NOT changed since that time  I am FEMALE/of child-bearing potential     REVIEW OF SYSTEMS:  Have you recently had or currently have any of the following? · No changes in my recent health  PAST MEDICAL HISTORY:  Have you personally ever had or currently have any of the following? If "YES," then please provide more detail  · No changes in my medical history  FAMILY HISTORY:  Any "first degree relatives" (parent, brother, sister, or child) with the following? • No changes in my family's known health  PATIENT EXPERIENCE:    • Do you want the Dermatologist to perform a COMPLETE skin exam today including a clinical examination under the "bra and underwear" areas? NO  • If necessary, do we have your permission to call and leave a detailed message on your Preferred Phone number that includes your specific medical information? Yes      No Known Allergies   Current Outpatient Medications:   •  Multiple Vitamins-Minerals (Multi-Vitamin Gummies) CHEW, Chew daily Juice's multivitamin, Disp: , Rfl:   •  Tretinoin, Facial Wrinkles, (Tretinoin, Emollient,) 0 05 % CREA, APPLY TO AFFECTED AREA NIGHTLY, Disp: 40 g, Rfl: 2  •  norethindrone-ethinyl estradiol (Junel FE 1/20) 1-20 MG-MCG per tablet, Take 1 tablet by mouth daily (Patient not taking: Reported on 11/17/2022), Disp: 84 tablet, Rfl: 3          • Whom besides the patient is providing clinical information about today's encounter?   o NO ADDITIONAL HISTORIAN (patient alone provided history)    Physical Exam and Assessment/Plan by Diagnosis:    SKIN CARE RECOMMENDATIONS     Morning    1  Wash your face with a BHA/AHA cleanser   Some examples are listed below but get one that works for you  Avoid ones with harsh scrubbing beads   · SkinFix Acne Cleanser (Bonny)     · Skin Medica AHA/BHA cleanser Exfoliating Cleanser  · Topix (or Replenix brand) AHA 2%/BHA 2%      2  Apply Vitamin C  Look for "Ascorbic acid" as an ingredient  This is the most effective form of Vitamin C  Some examples are listed below but get one that works for you  a  L'Oreal Revitalift 10% Pure Vitamin C   b  Cerave Skin Renewing Vitamin C Serum   c  Kiehl's Powerful Strength Line-Reducing Concentrate with Vitamin C  d  Shakila's Choice C15 Super Booster   e  SkinCeuticals Phloretin CF   f  ISDINs Vitamin C ampules (Flavo-C)    3  Apply sunscreen with zinc or titanium dioxide (check the ingredient list)  Some examples are listed below but get one that works for you and that you will use daily  It is ok to use a different sunscreen for the body if you have on already  a  La-Roche Posay Antihelios 50 Mineral    b  Elta MD UV Clear Broad Spectrum SPF 55  c  ISDIN Eryfotona Ageless Broad Spectrum SPF 50  d  Lindsay Dunaway Beauty The Beet Shield   e  Unsun Mineral Tinted Broad Spectrum Face Sunscreen   f  Skinceuticals Physical Matte UV Defense SPF 50  g  First Aid Be[employer] Sunscreen with Zinc Oxide   h  Colorscience Sunforgettable Total Protection Face Shield SPF 48  Beautycounter Countersun Mineral Sunscreen Lotion     4  Follow with make-up as needed       Night    1  Wash your face with a gentle cleanser or just splash with water  2  Moisturize your face with Cerave or Cetaphil creasm or Neutrogena Hydroboost for ExtraDry Skin  3  Next, alternate the following:   a  Mon, Wed, Friday: Apply pea to chick pea sized amount of tretinoin 0 05% emollient cream to the face and neck  It is ok to apply moisturizer on top of it as needed  Sent to [facility]    b  On opposite days, start using lightening cream (Azelaic Acid: 20%; Kojic Acid: 6%; Niacinamide: 4% Cream)   We will send this to you via [facility]     We use this service to prescribe medications that are often not covered by insurance  Your physician will send your prescription to the pharmacy  You will receive an email from [email or web address] where you will follow the instructions within the email to provide your billing and shipping information  Your medicine will be shipped directly to you  If you have any questions, you can call [phone] or email [email or web address]        Botox Procedure Note  VERBAL CONSENT     Screening Questions   Are you pregnant or breastfeeding? No  Do you take aspirin, fish oil or any other blood thinning medicines? No  Have you had an allergic reaction to any injectables before? No  Have you had any surgeries on your face? No  Have you been diagnosed with a muscle or nerve condition like myasthenia gravis, ALS or Lambert-Eaton syndrome? NO    Diagnosis: rhytides    Location: glabella, forehead and eyebrows    Informed consent: Discussed risks (infection, pain, bleeding, bruising, swelling, allergic reaction, paralysis of nearby muscles, eyelid droop, double vision, neck weakness, difficulty breathing, headache, undesirable cosmetic result, need for additional treatment, and death) and benefits of the procedure, as well as the alternatives  Informed consent was obtained  Preparation: The area was cleansed with alcohol  Procedure Details: Botox was injected into the dermis with a 30-gauge needle  Pressure applied to any bleeding  Lot Number: [id number]  Expiration: 06/2024  Total Units Injected: 12 U glabella, 15 U forehead, 2 x 2 5U eyebrow lift    Plan: Patient instructed to remain upright for 6 hours  Tylenol may be used for headache  Allow 2 weeks before returning to clinic for additional dosing as needed  Call for any problems  Written instructions provided       Tolerance: well-tolerated; no issues  Complications: None  Other procedures: N/A  Photography: yes    THIS IS A COSMETIC PATIENT WHO PAID OUT OF POCKET AT TIME OF VISIT  DO NOT BILL       TOTAL UNITS USED: 32  TOTAL COST: 432  Normal cost: $15 x # of units    If 20% off: $12 x # of units   If 30% off: $10 50 x # of units    Scribe Attestation    I,:  Sylwia Lopez am acting as a scribe while in the presence of the attending physician :       I,:  Anila Mcdonough MD personally performed the services described in this documentation    as scribed in my presence :

## 2022-11-22 ENCOUNTER — TELEPHONE (OUTPATIENT)
Dept: FAMILY MEDICINE CLINIC | Facility: CLINIC | Age: 47
End: 2022-11-22

## 2022-11-22 ENCOUNTER — TELEPHONE (OUTPATIENT)
Dept: OBGYN CLINIC | Facility: CLINIC | Age: 47
End: 2022-11-22

## 2022-11-22 DIAGNOSIS — R82.90 CLOUDY URINE: Primary | ICD-10-CM

## 2022-11-22 DIAGNOSIS — R39.9 URINARY SYMPTOM OR SIGN: Primary | ICD-10-CM

## 2022-11-22 NOTE — TELEPHONE ENCOUNTER
My chart message from pt  Sea Means  to [address] [facility] (supporting Dirk Blas MD)    CE    1:54 PM  Hi Dr Dwight Goldberg, I wanted to have a urine test for possible UTI - cloudy urine, back pain  Would it be possible for me to get a urine test for bacteria possibly today or tomorrow?   Thank you in advance,   Tommy Daniels

## 2022-11-22 NOTE — TELEPHONE ENCOUNTER
I ordered urine studies, but looks like her ob/gyn ordered the same  Ok to get these done and then will treat based on results  Find out what other symptoms she is having:  Pain w/ urination? Urinary frequency? Blood in urine? Fever?

## 2022-11-22 NOTE — TELEPHONE ENCOUNTER
Pt c/o new onset mid back pain and cloudy urine  No frequency, pain/burning, blood in urine, or fevers  Pt also called GYN and will be going to get the UA done tomorrow

## 2022-11-28 ENCOUNTER — TELEPHONE (OUTPATIENT)
Dept: DERMATOLOGY | Facility: CLINIC | Age: 47
End: 2022-11-28

## 2022-11-28 NOTE — TELEPHONE ENCOUNTER
Received fax stating PA is required for Tretinoin 0 025% Cream  Do you want a PA done or will you send to [facility]?  Thank you

## 2022-12-02 ENCOUNTER — EVALUATION (OUTPATIENT)
Dept: PHYSICAL THERAPY | Facility: CLINIC | Age: 47
End: 2022-12-02

## 2022-12-02 DIAGNOSIS — G89.29 CHRONIC LOW BACK PAIN WITHOUT SCIATICA, UNSPECIFIED BACK PAIN LATERALITY: Primary | ICD-10-CM

## 2022-12-02 DIAGNOSIS — M54.50 CHRONIC LOW BACK PAIN WITHOUT SCIATICA, UNSPECIFIED BACK PAIN LATERALITY: Primary | ICD-10-CM

## 2022-12-02 NOTE — PROGRESS NOTES
PT Evaluation     Today's date: 2022  Patient name: Sea Means  : 1975  MRN: [id number]  Referring provider: Dirk Blas MD  Dx:   Encounter Diagnosis     ICD-10-CM    1  Chronic low back pain without sciatica, unspecified back pain laterality  M54 50 Ambulatory Referral to Physical Therapy    G89 29 PT plan of care cert/re-cert          Start Time: 915  Stop Time: 1000  Total time in clinic (min): 45 minutes    Assessment  Assessment details: Sea Means is a pleasant 50 y o  female who presents with acute pain with extension preference and neural tension, as well as chronic pain due to weakness in hip abd and core stabilizers  The patient's greatest concerns are worry over not knowing what's wrong, concern at no signs of improvement and fear of not being able to keep active  No further referral appears necessary at this time based upon examination results  Primary movement impairment diagnosis of lumbar flexion dysfunction resulting in pathoanatomical symptoms of Chronic low back pain without sciatica, unspecified back pain laterality  (primary encounter diagnosis) and limiting her ability to drive, lift, sit and squat to  objects from the floor  Impairments include:  1) LBP extension preference  2) Neural tension  3) Weakness in hip abd and core    Discussed risks, benefits, and alternatives to treatment, and answered all patient questions to patient satisfaction    Impairments: abnormal muscle firing, abnormal muscle tone, abnormal or restricted ROM, abnormal movement, impaired physical strength, lacks appropriate home exercise program, pain with function and poor posture   Understanding of Dx/Px/POC: good   Prognosis: good    Goals  Impairment Goals 4-6 weeks  - Decrease pain to <3/10  - Improve lumbar AROM to >75% throughout  - Increase hip strength to 4+/5 throughout  - Increase core strength to be able to sit straight up without deviation    Functional Goals 6-8 weeks  - Return to Prior Level of Function  - Patient will be independent with HEP  - Patient will be able to sit without increased pain/compensation/difficulty  - Patient will be able to perform sit to stand without increased pain/compensation/difficulty   - Patient will be able to bend forward without increased pain/compensation/difficulty   - Patient will be able to lift with proper mechanics and no pain      Plan  Plan details: Prognosis above is given PT services 2x/week tapering to 1x/week over the next 2 months and home program adherence  Patient would benefit from: skilled physical therapy  Planned modality interventions: cryotherapy, TENS, thermotherapy: hydrocollator packs and unattended electrical stimulation  Planned therapy interventions: abdominal trunk stabilization, behavior modification, body mechanics training, breathing training, flexibility, functional ROM exercises, home exercise program, joint mobilization, manual therapy, massage, Sotelo taping, muscle pump exercises, neuromuscular re-education, patient education, postural training, strengthening, stretching, therapeutic activities, therapeutic exercise and therapeutic training  Frequency: 2x week  Duration in weeks: 8  Treatment plan discussed with: patient        Subjective Evaluation    History of Present Illness  Mechanism of injury: 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, at a computer most of the day  HOBBIES/EXERCISE: walks from 30 min to 1 5 hours daily  PLOF: hx of chronic pain in back/hips for 5+ months  HISTORY OF CURRENT INJURY:  Patient was having pain in both hips and low back pain for several months (5+)  2 weeks ago, after lifting something, the next day she got pain in her back pain that was severe that radiated around to her stomach  She was having trouble walking, sleeping, and moving  She prayed about it on Monday and Tuesday she felt great   It came back Wednesday and overall she is feeling much better  She is back to all of her normal activities  She does still have the chronic pain but it is not too bad right now  This is pretty consistent  No change in symptoms with movement  Acute pain was improved with walking  PAIN LOCATION/DESCRIPTORS: Pain in both hips just below iliac crest is the old pain  Some low back pain intermittently  AGGRAVATING FACTORS: bending forwards, sitting, driving, going on her toes  EASES: walking, advil  DAY PATTERN: none  IMAGING:  none  SPECIAL QUESTIONS:    Lala Reed denies a new onset of History of cancer, Tingling and Numbness  PATIENT GOALS: Patient wishes to know what exercises she can do at home to help with the pain  Pain  Current pain ratin  At best pain ratin  At worst pain ratin  Location: low back  Quality: dull ache and discomfort  Progression: improved    Patient Goals  Patient goals for therapy: decreased pain, increased motion and increased strength          Objective     Active Range of Motion     Lumbar   Flexion: 80 degrees  with pain  Extension: 30 degrees   Left lateral flexion:  WFL  Right lateral flexion:  WFL  Left rotation:  WF  Right rotation:  [facility]    Additional Active Range of Motion Details  No pain in standing, slight ache  Repeated extension in standing improved pain  FF in standing increased low back discomfort    SB pulling in hips, asterisks discomfort in hips      Strength/Myotome Testing     Left Hip   Planes of Motion   Flexion: 4  Abduction: 3+  External rotation: 4  Internal rotation: 4    Right Hip   Planes of Motion   Flexion: 4  Abduction: 3+  External rotation: 4  Internal rotation: 4    Left Knee   Flexion: 4  Extension: 4    Right Knee   Flexion: 4  Extension: 4    Left Ankle/Foot   Dorsiflexion: 4+    Right Ankle/Foot   Dorsiflexion: 4+    Tests     Lumbar     Left   Positive slump test    Negative passive SLR  Right   Positive slump test    Negative passive SLR       Left Pelvic Girdle/Sacrum Negative: thigh thrust      Right Pelvic Girdle/Sacrum   Negative: thigh thrust      Left Hip   Positive SALVADOR  Negative FADIR  Right Hip   Negative SALVADOR and FADIR       Additional Tests Details  Multifidus: glute, back, then hamstring               Diagnosis: Acute on chronic LBP   Precautions: none   Primary Goals: 1) LBP extension preference  2) Neural tension  3) Weakness in hip abd and core   *asterisks by exercise = given for HEP   Manuals 12/2       Prone PAs                                        There Ex        TM        Prone        RICKI        PPU        Supine nerve glide        Sitting nerve glide        LTR                        Neuro Re-Ed        ADELINA Akins with amps        Mult press        Bridge        Clam        XL SLR        X walk        Monster walk        SL deadlift                         Re-evaluation              Ther Act                                         Modalities

## 2022-12-05 ENCOUNTER — OFFICE VISIT (OUTPATIENT)
Dept: PHYSICAL THERAPY | Facility: CLINIC | Age: 47
End: 2022-12-05

## 2022-12-05 DIAGNOSIS — G89.29 CHRONIC LOW BACK PAIN WITHOUT SCIATICA, UNSPECIFIED BACK PAIN LATERALITY: Primary | ICD-10-CM

## 2022-12-05 DIAGNOSIS — M54.50 CHRONIC LOW BACK PAIN WITHOUT SCIATICA, UNSPECIFIED BACK PAIN LATERALITY: Primary | ICD-10-CM

## 2022-12-05 NOTE — PROGRESS NOTES
Daily Note     Today's date: 2022  Patient name: Julieth Lopes  : 1975  MRN: [id number]  Referring provider: Fay Cormier MD  Dx:   Encounter Diagnosis     ICD-10-CM    1  Chronic low back pain without sciatica, unspecified back pain laterality  M54 50     G89 29           Start Time: 0915  Stop Time: 1000  Total time in clinic (min): 45 minutes    Subjective: Patient went to yoga and downward dog was really bad for her  Sitting in her car is also painful  She has pain in low back currently but nothing in the hips  Objective: See treatment diary below    Assessment: Started with prone progression and patient noted improvement in back pain with this and LTR  Began hip and core strengthening which she tolerated well  Added all to HEP  Plan: Progress treatment as tolerated         Diagnosis: Acute on chronic LBP   Precautions: none   Primary Goals: 1) LBP extension preference  2) Neural tension  3) Weakness in hip abd and core   *asterisks by exercise = given for HEP   Manuals       Prone PAs  IM, PT                                      There Ex        TM  5 min       Prone  2 min      RICKI  2 min      PPU*  2x10 elbows, 2x10 hands      Supine nerve glide  2x10 cherry      Sitting nerve glide        LTR*  5 sec x 10                      Neuro Re-Ed        TA        kegel        Mult with amps        Mult press        Bridge*  RTB around knees 3x10      Clam*  RTB 3x10      SL SLR*  3x10 cherry      X walk        Monster walk        SL deadlift                         Re-evaluation              Ther Act                                         Modalities

## 2022-12-06 ENCOUNTER — TELEPHONE (OUTPATIENT)
Dept: DERMATOLOGY | Facility: CLINIC | Age: 47
End: 2022-12-06

## 2022-12-07 ENCOUNTER — OFFICE VISIT (OUTPATIENT)
Dept: PHYSICAL THERAPY | Facility: CLINIC | Age: 47
End: 2022-12-07

## 2022-12-07 DIAGNOSIS — M54.50 CHRONIC LOW BACK PAIN WITHOUT SCIATICA, UNSPECIFIED BACK PAIN LATERALITY: Primary | ICD-10-CM

## 2022-12-07 DIAGNOSIS — G89.29 CHRONIC LOW BACK PAIN WITHOUT SCIATICA, UNSPECIFIED BACK PAIN LATERALITY: Primary | ICD-10-CM

## 2022-12-07 NOTE — PROGRESS NOTES
Daily Note     Today's date: 2022  Patient name: Dylan Pritchard  : 1975  MRN: [id number]  Referring provider: Hipolito Castillo MD  Dx:   Encounter Diagnosis     ICD-10-CM    1  Chronic low back pain without sciatica, unspecified back pain laterality  M54 50     G89 29           Start Time: 1530  Stop Time: 1615  Total time in clinic (min): 45 minutes    Subjective: Patient states she is feeling better  Objective: See treatment diary below      Assessment:Continued with outlined program  Patient was able to begin with piriformis S with noticeable tightness bilaterally  She has no change in symptoms with RICKI  Patient educated on core, kegel and multifidus activation  She does require cues to allow for activation and then sustaining  She instinctively arches her back with multifidus, but is able to self correct and has good carryover following  Patient aware of muscle activation with dynamic movement as she will continue to trial with daily activities  Will assess next visit  Plan: Progress treatment as tolerated         Diagnosis: Acute on chronic LBP   Precautions: none   Primary Goals: 1) LBP extension preference  2) Neural tension  3) Weakness in hip abd and core   *asterisks by exercise = given for HEP   Manuals      Prone PAs  IM, PT                                      There Ex        TM  5 min  5 min      Prone  2 min      RICKI  2 min 2 min      PPU*  2x10 elbows, 2x10 hands      Supine nerve glide  2x10 cherry      Sitting nerve glide        LTR*  5 sec x 10      Piriformis S    3x30 sec                                      Neuro Re-Ed        TA   1 min      kegel   1 min      Mult with amps*   10x10 sec      Mult press        Bridge*  RTB around knees 3x10 RTB 2x10 with TA/kegel      Clam*  RTB 3x10 RTB 2x10 with TA/kegel      SL SLR*  3x10 cherry      X walk        Monster walk        SL deadlift                         Re-evaluation              Ther Act             Pt education     KLS, PTA                      Modalities

## 2022-12-14 ENCOUNTER — APPOINTMENT (OUTPATIENT)
Dept: PHYSICAL THERAPY | Facility: CLINIC | Age: 47
End: 2022-12-14

## 2022-12-16 ENCOUNTER — APPOINTMENT (OUTPATIENT)
Dept: PHYSICAL THERAPY | Facility: CLINIC | Age: 47
End: 2022-12-16

## 2022-12-19 ENCOUNTER — OFFICE VISIT (OUTPATIENT)
Dept: PHYSICAL THERAPY | Facility: CLINIC | Age: 47
End: 2022-12-19

## 2022-12-19 DIAGNOSIS — M54.50 CHRONIC LOW BACK PAIN WITHOUT SCIATICA, UNSPECIFIED BACK PAIN LATERALITY: Primary | ICD-10-CM

## 2022-12-19 DIAGNOSIS — G89.29 CHRONIC LOW BACK PAIN WITHOUT SCIATICA, UNSPECIFIED BACK PAIN LATERALITY: Primary | ICD-10-CM

## 2022-12-19 NOTE — PROGRESS NOTES
Daily Note     Today's date: 2022  Patient name: Johnathon Ferrer  : 1975  MRN: [id number]  Referring provider: Ivone Méndez MD  Dx:   Encounter Diagnosis     ICD-10-CM    1  Chronic low back pain without sciatica, unspecified back pain laterality  M54 50     G89 29           Start Time: 1000  Stop Time: 1040  Total time in clinic (min): 40 minutes    Subjective: Patient states she is sore  She states she is progressing  She is still having trouble with the kegels, but she is trying  Objective: See treatment diary below      Assessment: Continued with outlined program  Patient states she has some pain in her low back with HS stretching  She responded well to IASTM for cherry paraspinals in low back with erythema  She was able to perform open books cherry with noticeable tightness greater in the R  Patient has noticeable weakness in cherry hips with [employer] and monster walk progression this visit; added Xwalks to HEP  Will continue to progress as she is able to tolerate  Plan: Progress treatment as tolerated         Diagnosis: Acute on chronic LBP   Precautions: none   Primary Goals: 1) LBP extension preference  2) Neural tension  3) Weakness in hip abd and core   *asterisks by exercise = given for HEP   Manuals     Prone PAs  IM, PT      IASTM     Low back cherry KLS, PTA                             There Ex        TM  5 min  5 min  5 min     Prone  2 min      RICKI  2 min 2 min      PPU*  2x10 elbows, 2x10 hands      Supine nerve glide  2x10 cherry      Sitting nerve glide        LTR*  5 sec x 10      Piriformis S    3x30 sec  3x30 sec     HS S     3x30 sec cherry     Open books    10x10 sec                     Neuro Re-Ed        TA   1 min      kegel   1 min      Mult with amps*   10x10 sec      Mult press        Bridge*  RTB around knees 3x10 RTB 2x10 with TA/kegel      Clam*  RTB 3x10 RTB 2x10 with TA/kegel      SL SLR*  3x10 cherry      X walk*    YTB 1 lap     Monster walk    YTB 2 laps SL deadlift                         Re-evaluation              Ther Act             Pt education     KLS, PTA  KLS, PTA                    Modalities

## 2022-12-28 ENCOUNTER — APPOINTMENT (OUTPATIENT)
Dept: PHYSICAL THERAPY | Facility: CLINIC | Age: 47
End: 2022-12-28

## 2023-01-03 ENCOUNTER — EVALUATION (OUTPATIENT)
Dept: PHYSICAL THERAPY | Facility: CLINIC | Age: 48
End: 2023-01-03

## 2023-01-03 DIAGNOSIS — M54.50 CHRONIC LOW BACK PAIN WITHOUT SCIATICA, UNSPECIFIED BACK PAIN LATERALITY: Primary | ICD-10-CM

## 2023-01-03 DIAGNOSIS — G89.29 CHRONIC LOW BACK PAIN WITHOUT SCIATICA, UNSPECIFIED BACK PAIN LATERALITY: Primary | ICD-10-CM

## 2023-01-03 NOTE — PROGRESS NOTES
PT Discharge    Today's date: 1/3/2023  Patient name: Ernestine Gordon  : 1975  MRN: [id number]  Referring provider: Gary Hinds MD  Dx:   Encounter Diagnosis     ICD-10-CM    1  Chronic low back pain without sciatica, unspecified back pain laterality  M54 50     G89 29           Start Time: 745  Stop Time: 823  Total time in clinic (min): 38 minutes    Assessment  Assessment details: Ernestine Gordon has been compliant with attending physical therapy and completing home exercise program since initial evaluation  Maria A Betty  has made improvements in objective data since initial evalulation and has achieved all goals  Patient reports having returned to their prior level of function  Patient provided with updated Home Exercise Program, all questions answered, and verbalized understanding, agreeing to plan of care   Thus it was mutually decided to discontinue this episode of care and transition to Home Exercise Program    Impairments: impaired physical strength  Understanding of Dx/Px/POC: good   Prognosis: good    Goals  Impairment Goals 4-6 weeks  - Decrease pain to <3/10  - Improve lumbar AROM to >75% throughout  - Increase hip strength to 4+/5 throughout  - Increase core strength to be able to sit straight up without deviation    Functional Goals 6-8 weeks  - Return to Prior Level of Function  - Patient will be independent with HEP  - Patient will be able to sit without increased pain/compensation/difficulty  - Patient will be able to perform sit to stand without increased pain/compensation/difficulty   - Patient will be able to bend forward without increased pain/compensation/difficulty   - Patient will be able to lift with proper mechanics and no pain      Plan  Planned therapy interventions: home exercise program  Treatment plan discussed with: patient        Subjective Evaluation    History of Present Illness  Mechanism of injury: 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[employer], at a [employer] most of the day  HOBBIES/EXERCISE: walks from 30 min to 1 5 hours daily  PLOF: hx of chronic pain in back/hips for 5+ months  HISTORY OF CURRENT INJURY:  Patient was having pain in both hips and low back pain for several months (5+)  2 weeks ago, after lifting something, the next day she got pain in her back pain that was severe that radiated around to her stomach  She was having trouble walking, sleeping, and moving  She prayed about it on Monday and Tuesday she felt great  It came back Wednesday and overall she is feeling much better  She is back to all of her normal activities  She does still have the chronic pain but it is not too bad right now  This is pretty consistent  No change in symptoms with movement  Acute pain was improved with walking  Update: Patient reports complete resolution of her symptoms at this time  She has not had any pain since last week  PAIN LOCATION/DESCRIPTORS: None remains  AGGRAVATING FACTORS: none  Improved: bending forwards, sitting, driving, going on her toes  EASES: exercises  DAY PATTERN: none  IMAGING:  none  SPECIAL QUESTIONS:    Mateus Price denies a new onset of History of cancer, Tingling and Numbness     PATIENT GOALS: Patient wishes to know what exercises she can do at home to help with the pain  -100% improved    Pain  No pain reported  Current pain ratin  At best pain ratin  At worst pain ratin  Progression: resolved    Patient Goals  Patient goals for therapy: decreased pain, increased motion and increased strength          Objective     Active Range of Motion     Lumbar   Flexion: 80 degrees  WFL  Extension: 30 degrees  WFL  Left lateral flexion:  WFL  Right lateral flexion:  WFL  Left rotation:  [facility]  Right rotation:  [facility]    Additional Active Range of Motion Details        Strength/Myotome Testing     Left Hip   Planes of Motion   Flexion: 4+  Abduction: 4-  External rotation: 4  Internal rotation: 4    Right Hip   Planes of Motion Flexion: 4+  Abduction: 4-  External rotation: 4  Internal rotation: 4    Left Knee   Flexion: 4  Extension: 4    Right Knee   Flexion: 4  Extension: 4    Left Ankle/Foot   Dorsiflexion: 4+    Right Ankle/Foot   Dorsiflexion: 4+    Tests     Lumbar     Left   Negative passive SLR and slump test      Right   Negative passive SLR and slump test      Left Pelvic Girdle/Sacrum   Negative: thigh thrust      Right Pelvic Girdle/Sacrum   Negative: thigh thrust      Left Hip   Negative SALVADOR and FADIR  Right Hip   Negative SALVADOR and FADIR       Additional Tests Details  Multifidus: glute and back simultaneously, then hamstring                Diagnosis: Acute on chronic LBP   Precautions: none   Primary Goals: 1) LBP extension preference  2) Neural tension  3) Weakness in hip abd and core   *asterisks by exercise = given for HEP   Manuals 12/2 12/5 12/7 12/19 1/3   Prone PAs  IM, PT      IASTM     Low back cherry KLS, PTA                             There Ex        TM  5 min  5 min  5 min  5 min   Prone  2 min      RICKI  2 min 2 min      PPU*  2x10 elbows, 2x10 hands      Supine nerve glide  2x10 cherry      Sitting nerve glide        LTR*  5 sec x 10      Piriformis S    3x30 sec  3x30 sec     HS S     3x30 sec cherry     Open books    10x10 sec                     Neuro Re-Ed        TA   1 min      kegel   1 min      Mult with amps*   10x10 sec      Mult press        Bridge*  RTB around knees 3x10 RTB 2x10 with TA/kegel      Clam*  RTB 3x10 RTB 2x10 with TA/kegel      SL SLR*  3x10 cherry      X walk*    YTB 1 lap     Monster walk    YTB 2 laps     SL deadlift                         Re-evaluation          IM, PT    Ther Act             Pt education     KLS, PTA  KLS, PTA   IM, PT                 Modalities

## 2023-01-09 ENCOUNTER — APPOINTMENT (OUTPATIENT)
Dept: PHYSICAL THERAPY | Facility: CLINIC | Age: 48
End: 2023-01-09

## 2023-02-18 ENCOUNTER — COSMETIC (OUTPATIENT)
Dept: PLASTIC SURGERY | Facility: CLINIC | Age: 48
End: 2023-02-18

## 2023-02-18 DIAGNOSIS — Z41.1 ENCOUNTER FOR COSMETIC PROCEDURE: ICD-10-CM

## 2023-02-18 DIAGNOSIS — Z41.1 ENCOUNTER FOR COSMETIC SURGERY: Primary | ICD-10-CM

## 2023-02-18 RX ORDER — TRETINOIN 0.5 MG/G
CREAM TOPICAL
Qty: 20 G | Refills: 0 | Status: SHIPPED | OUTPATIENT
Start: 2023-02-18

## 2023-02-18 NOTE — PROGRESS NOTES
After informed consent,15 units of botox was injected into the glabella and forehead  The patient tolerated the procedure well  Post-procedure instructions provided  Will send in progressive strength of retinol and discussed application  Also discussed PRP to eyes  Patient will schedule at her convenience as prior neurotoxin around eyes did not address her concerns  Patient to return in 2 weeks for recheck or sooner should any issues arise  Marguerite Dillon, DO  Plastic and Reconstructive Surgery

## 2023-03-08 ENCOUNTER — TELEPHONE (OUTPATIENT)
Dept: GASTROENTEROLOGY | Facility: CLINIC | Age: 48
End: 2023-03-08

## 2023-03-08 ENCOUNTER — TELEPHONE (OUTPATIENT)
Dept: FAMILY MEDICINE CLINIC | Facility: CLINIC | Age: 48
End: 2023-03-08

## 2023-03-08 ENCOUNTER — PREP FOR PROCEDURE (OUTPATIENT)
Dept: GASTROENTEROLOGY | Facility: CLINIC | Age: 48
End: 2023-03-08

## 2023-03-08 DIAGNOSIS — Z12.11 SCREENING FOR COLON CANCER: Primary | ICD-10-CM

## 2023-03-08 NOTE — TELEPHONE ENCOUNTER
Scheduled date of colonoscopy (as of today): 04/20/2023    Physician performing colonoscopy: Dr Devries    Location of colonoscopy:  [facility]    Clearances:

## 2023-03-08 NOTE — TELEPHONE ENCOUNTER
Patient called, she is turning 46 on 4[birth year]5 and would like to schedule a shingles vaccine, is this ok to set up?

## 2023-03-09 ENCOUNTER — OFFICE VISIT (OUTPATIENT)
Dept: OBGYN CLINIC | Facility: CLINIC | Age: 48
End: 2023-03-09

## 2023-03-09 VITALS
DIASTOLIC BLOOD PRESSURE: 80 MMHG | SYSTOLIC BLOOD PRESSURE: 110 MMHG | WEIGHT: 108.6 LBS | BODY MASS INDEX: 19.24 KG/M2 | HEIGHT: 63 IN

## 2023-03-09 DIAGNOSIS — N93.9 ABNORMAL UTERINE BLEEDING (AUB): Primary | ICD-10-CM

## 2023-03-09 NOTE — PROGRESS NOTES
Artis Eisenberg was seen today for vaginal bleeding  Diagnoses and all orders for this visit:    Abnormal uterine bleeding (AUB)  -     Tissue Exam         Plan   EMB performed - will call pt with results  Discussed possible use of LNG IUD as an alternative hormonal method of controlling bleeding  Could also consider surgical procedures such as endometrial ablation or, definitively, hysterectomy  F/up 4-6 weeks to discuss next steps         Subjective     Fernanda Morgan is a 50 y o  female here for a problem visit  Patient is complaining of prolonged and occasional intermenstrual bleeding  Did a trial of CHCs which resulted in significant mood swings so she stopped taking them  Period has been regular but very long, lasting 12 days  Most recently had a day of bleeding in the middle of her cycle on , prior to that had gone a few months without having intermenstrual bleeding    Denies bleeding after sex  Previously underwent D&C for endometrial polyp in , has had recent TVUS showing 2 small fibroids, has had normal recent pap  Patient Active Problem List   Diagnosis   • Vitamin D deficiency   • Family history of ischemic heart disease (IHD)   • Diffuse cystic mastopathy   • PVC (premature ventricular contraction)   • Insomnia   • Lymphadenopathy of head and neck   • Chronic eczematous otitis externa of left ear   • Restless sleeper   • TMJ syndrome   • Endometrial polyp   • S/P dilation and curettage         Gynecologic History  No LMP recorded (lmp unknown)        Obstetric History  OB History    Para Term  AB Living   2 2 2     2   SAB IAB Ectopic Multiple Live Births           2      # Outcome Date GA Lbr Conor/2nd Weight Sex Delivery Anes PTL Lv   2 Term      Vag-Spont   FRANSISCO   1 Term      Vag-Spont   FRANSISCO      Obstetric Comments   Menarche 11        Past Medical/Surgical/Family/Social History  The following portions of the patient's history were reviewed and updated as appropriate: allergies, current medications, past family history, past medical history, past social history, past surgical history and problem list     Allergies  Patient has no known allergies  Medications    Current Outpatient Medications:   •  Multiple Vitamins-Minerals (Multi-Vitamin Gummies) CHEW, Chew daily Juice's multivitamin, Disp: , Rfl:   •  tretinoin (REFISSA) 0 05 % cream, Apply every other night for two weeks then begin increasing one night per week until using nightly  Apply to face and neck  , Disp: 20 g, Rfl: 0  •  tretinoin (RETIN-A) 0 025 % cream, Apply topically daily at bedtime Please specify directions, refills and quantity, Disp: 45 g, Rfl: 2      Review of Systems  Constitutional: no fever, feels well  Gastrointestinal: no complaints of abdominal pain, constipation, nausea, vomiting  Genitourinary : see HPI       Objective     /80 (BP Location: Right arm, Patient Position: Sitting, Cuff Size: Standard)   Ht 5' 3" (1 6 m)   Wt 49 3 kg (108 lb 9 6 oz)   LMP  (LMP Unknown)   BMI 19 24 kg/m²     General: alert and oriented, in no acute distress  Vulva: normal, Bartholin's, Urethra, Kennan's normal  Vagina: normal mucosa, normal discharge  Cervix:  multiparous appearance and no cervical motion tenderness  Uterus:  normal size, mobile, non-tender, normal shape and consistency  Adnexa: normal adnexa and no mass, fullness, tenderness      Endometrial biopsy    Date/Time: 3/9/2023 10:30 AM  Performed by: Peña Amanda DO  Authorized by: Peña Amanda DO   Universal Protocol:  Consent: Verbal consent obtained  Written consent obtained    Risks and benefits: risks, benefits and alternatives were discussed  Consent given by: patient  Patient understanding: patient states understanding of the procedure being performed  Patient consent: the patient's understanding of the procedure matches consent given  Relevant documents: relevant documents present and verified      Indication:     Indications: Other disorder of menstruation and other abnormal bleeding from female genital tract    Procedure:     Procedure: endometrial biopsy with Pipelle      A bivalve speculum was placed in the vagina: yes      Cervix cleaned and prepped: yes      Uterus sounded: yes      Uterus sound depth (cm):  8    Specimen collected: specimen collected and sent to pathology    Comments:     Procedure comments:  A bimanual exam revealed normal size and position of the uterus  The patient was placed in dorsal lithotomy position, and a sterile speculum was inserted  The cervix was visualized and prepped with betadine  A tenaculum was applied to the anterior lip of the cervix  A sound was placed through the cervical os in sterile fashion, and the uterus sounded to 8 cm  The endometrial biopsy pipelle passed through the internal os easily  Tissue sample was collected in the usual fashion using two passes of the pipelle  The patient tolerated the procedure well with no complications

## 2023-04-25 ENCOUNTER — COSMETIC (OUTPATIENT)
Dept: PLASTIC SURGERY | Facility: CLINIC | Age: 48
End: 2023-04-25

## 2023-04-25 DIAGNOSIS — Z41.1 ENCOUNTER FOR COSMETIC PROCEDURE: ICD-10-CM

## 2023-04-26 ENCOUNTER — CLINICAL SUPPORT (OUTPATIENT)
Dept: FAMILY MEDICINE CLINIC | Facility: CLINIC | Age: 48
End: 2023-04-26

## 2023-04-26 DIAGNOSIS — Z23 ENCOUNTER FOR IMMUNIZATION: Primary | ICD-10-CM

## 2023-04-26 NOTE — PROGRESS NOTES
Botox Consult     First time?: no, patient had botox with me before in past and more recently with Dr Juan José Mendez a month ago  Allergies: none  Blood thinners: none   Pregnant: none    Patient has had botox in the past, more recently with Dr Juan José Mendez, interested in maintenance  Particular areas of concern: crows feet  Concerned about exacerbation of jelly roll areas  Will proceed with 10 units of botox, 5 on each crows foot  Discussed that this is conservative dosage  Patient ok and acknowledged  Risks and benefits discussed, patient agreed to proceed       5 units to each crows feet     Total 10 units, 10% off     -Will email quote for lower bleph  Patient tolerated well, f/u in 3 months        Tammy Miguel MD   [facility] Plastic and Reconstructive Surgery   [address], [address]   Office: [phone]

## 2023-07-05 ENCOUNTER — TELEPHONE (OUTPATIENT)
Age: 48
End: 2023-07-05

## 2023-07-05 NOTE — TELEPHONE ENCOUNTER
Scheduled date of colonoscopy (as of today): 07-   Physician performing colonoscopy: Dr. Elian Patel   Location of colonoscopy: [facility]  Bowel prep reviewed with patient: attached 5-   Instructions reviewed with patient by: attached 5-   Clearances:

## 2023-07-17 ENCOUNTER — OFFICE VISIT (OUTPATIENT)
Dept: OBGYN CLINIC | Facility: CLINIC | Age: 48
End: 2023-07-17
Payer: COMMERCIAL

## 2023-07-17 VITALS
DIASTOLIC BLOOD PRESSURE: 64 MMHG | HEIGHT: 63 IN | BODY MASS INDEX: 19.24 KG/M2 | SYSTOLIC BLOOD PRESSURE: 98 MMHG | WEIGHT: 108.6 LBS

## 2023-07-17 DIAGNOSIS — N93.9 ABNORMAL UTERINE BLEEDING: Primary | ICD-10-CM

## 2023-07-17 DIAGNOSIS — N93.9 ABNORMAL UTERINE BLEEDING: ICD-10-CM

## 2023-07-17 PROCEDURE — 99214 OFFICE O/P EST MOD 30 MIN: CPT | Performed by: OBSTETRICS & GYNECOLOGY

## 2023-07-17 RX ORDER — MEDROXYPROGESTERONE ACETATE 5 MG/1
5 TABLET ORAL DAILY
Qty: 90 TABLET | Refills: 4 | Status: SHIPPED | OUTPATIENT
Start: 2023-07-17

## 2023-07-17 NOTE — TELEPHONE ENCOUNTER
Called and spoke with pt, pt states she is not sure if she even wants to start [facility] as of now. She would like to do research and read about it first, if she would like to try Slynd then pt states she was given a coupon card that she will try to use at the pharmacy. If the pharmacy isnt able to use coupon card or medication is still too expensive then pt will call to let us know she wants us to work on the prior [facility].

## 2023-07-17 NOTE — TELEPHONE ENCOUNTER
Can you please call the patient and let her know this requires a prior auth? She may want to just not do the Slynd and do provera instead. If she wants to do the prior auth will you please do that too? Thank you!

## 2023-07-17 NOTE — PROGRESS NOTES
Lashawn Gillis was seen today for follow-up. Diagnoses and all orders for this visit:    Abnormal uterine bleeding  -     Drospirenone 4 MG TABS; Take 1 tablet by mouth daily at bedtime  -     medroxyPROGESTERone (PROVERA) 5 mg tablet; Take 1 tablet (5 mg total) by mouth daily         Plan   Discussed options - CHCs, POPs (contraceptive dose vs higher dose), IUD, surgical management. Pt declined CHCs, IUD, surgical options. Would like to try POPs. Recommend slynd as this has placebo days to facilitate regular bleeding without breakthrough. Discussed inconsistent insurance coverage - pt requested I send both slynd and provera to her pharmacy. If covered, recommend she start with Slynd. If not covered then take provera once daily. F/up for annual exam or sooner if continue irregular/breakthrough bleeding. Subjective     Stacie Urrutia is a 46 y.o. female here for follow up. Underwent EMB in March for prolonged bleeding. Has regular cycles, but they last approx 2 weeks. First week is dark brown bleeding and then second week is heavy bleeding. Tried CHCs but did not feel well on them. Reviewed benign pathology results. Patient Active Problem List   Diagnosis   • Vitamin D deficiency   • Family history of ischemic heart disease (IHD)   • Diffuse cystic mastopathy   • PVC (premature ventricular contraction)   • Insomnia   • Lymphadenopathy of head and neck   • Chronic eczematous otitis externa of left ear   • Restless sleeper   • TMJ syndrome   • Endometrial polyp   • S/P dilation and curettage         Gynecologic History  No LMP recorded (lmp unknown).       Obstetric History  OB History    Para Term  AB Living   2 2 2     2   SAB IAB Ectopic Multiple Live Births           2      # Outcome Date GA Lbr Conor/2nd Weight Sex Delivery Anes PTL Lv   2 Term 2006     Vag-Spont   FRANSISCO   1 Term      Vag-Spont   FRANSISCO      Obstetric Comments   Menarche 11        Past Medical/Surgical/Family/Social History  The following portions of the patient's history were reviewed and updated as appropriate: allergies, current medications, past family history, past medical history, past social history, past surgical history and problem list.    Allergies  Patient has no known allergies. Medications    Current Outpatient Medications:   •  Multiple Vitamins-Minerals (Multi-Vitamin Gummies) CHEW, Chew daily Juice's multivitamin, Disp: , Rfl:   •  tretinoin (REFISSA) 0.05 % cream, Apply every other night for two weeks then begin increasing one night per week until using nightly. Apply to face and neck. , Disp: 20 g, Rfl: 0  •  tretinoin (RETIN-A) 0.025 % cream, Apply topically daily at bedtime Please specify directions, refills and quantity, Disp: 45 g, Rfl: 2      Review of Systems  Constitutional: no fever, feels well  Gastrointestinal: no complaints of abdominal pain, constipation  Genitourinary : see HPI       Objective     BP 98/64 (BP Location: Left arm, Patient Position: Sitting, Cuff Size: Standard)   Ht 5' 3" (1.6 m)   Wt 49.3 kg (108 lb 9.6 oz)   LMP  (LMP Unknown)   BMI 19.24 kg/m²     GEN: The patient was alert and oriented x3, pleasant well-appearing female in no acute distress.    CV: Regular rate  PULM: nonlabored respirations  MSK: Normal gait  Skin: warm, dry  Neuro: no focal deficits  Psych: normal affect and judgement, cooperative

## 2023-07-18 DIAGNOSIS — N93.9 ABNORMAL UTERINE BLEEDING: ICD-10-CM

## 2023-07-18 RX ORDER — DROSPIRENONE 4 MG/1
TABLET, FILM COATED ORAL
Qty: 84 TABLET | Refills: 4 | Status: SHIPPED | OUTPATIENT
Start: 2023-07-18

## 2023-07-18 RX ORDER — DROSPIRENONE 4 MG/1
TABLET, FILM COATED ORAL
Qty: 84 TABLET | Refills: 4 | OUTPATIENT
Start: 2023-07-18

## 2023-08-07 DIAGNOSIS — Z12.11 SCREENING FOR COLON CANCER: Primary | ICD-10-CM

## 2023-08-07 NOTE — TELEPHONE ENCOUNTER
Please follow directions sent to you by the office. Golytely ordered for your procedure 08/28/2023 at  [facility] with Dr. Waldemar Chavez.   They will call you night prior to give you the arrival  time

## 2023-08-16 ENCOUNTER — TELEPHONE (OUTPATIENT)
Age: 48
End: 2023-08-16

## 2023-08-16 NOTE — TELEPHONE ENCOUNTER
Patient contacted office. She received information for 2 different preps. Patient prefers Miralax/dulcolax. Procedure directions sent via  [employer].

## 2023-08-28 ENCOUNTER — HOSPITAL ENCOUNTER (OUTPATIENT)
Dept: GASTROENTEROLOGY | Facility: AMBULARY SURGERY CENTER | Age: 48
Setting detail: OUTPATIENT SURGERY
Discharge: HOME | End: 2023-08-28
Attending: INTERNAL MEDICINE
Payer: COMMERCIAL

## 2023-08-28 ENCOUNTER — ANESTHESIA (OUTPATIENT)
Dept: GASTROENTEROLOGY | Facility: AMBULARY SURGERY CENTER | Age: 48
End: 2023-08-28

## 2023-08-28 ENCOUNTER — ANESTHESIA EVENT (OUTPATIENT)
Dept: GASTROENTEROLOGY | Facility: AMBULARY SURGERY CENTER | Age: 48
End: 2023-08-28

## 2023-08-28 VITALS
TEMPERATURE: 97.8 F | HEIGHT: 63 IN | WEIGHT: 104 LBS | BODY MASS INDEX: 18.43 KG/M2 | RESPIRATION RATE: 16 BRPM | DIASTOLIC BLOOD PRESSURE: 65 MMHG | HEART RATE: 67 BPM | OXYGEN SATURATION: 100 % | SYSTOLIC BLOOD PRESSURE: 99 MMHG

## 2023-08-28 DIAGNOSIS — Z12.11 SCREENING FOR COLON CANCER: ICD-10-CM

## 2023-08-28 PROCEDURE — 88305 TISSUE EXAM BY PATHOLOGIST: CPT | Performed by: PATHOLOGY

## 2023-08-28 RX ORDER — PROPOFOL 10 MG/ML
INJECTION, EMULSION INTRAVENOUS AS NEEDED
Status: DISCONTINUED | OUTPATIENT
Start: 2023-08-28 | End: 2023-08-28

## 2023-08-28 RX ORDER — LIDOCAINE HYDROCHLORIDE 10 MG/ML
INJECTION, SOLUTION EPIDURAL; INFILTRATION; INTRACAUDAL; PERINEURAL AS NEEDED
Status: DISCONTINUED | OUTPATIENT
Start: 2023-08-28 | End: 2023-08-28

## 2023-08-28 RX ORDER — SODIUM CHLORIDE, SODIUM LACTATE, POTASSIUM CHLORIDE, CALCIUM CHLORIDE 600; 310; 30; 20 MG/100ML; MG/100ML; MG/100ML; MG/100ML
INJECTION, SOLUTION INTRAVENOUS CONTINUOUS PRN
Status: DISCONTINUED | OUTPATIENT
Start: 2023-08-28 | End: 2023-08-28

## 2023-08-28 RX ADMIN — SODIUM CHLORIDE, SODIUM LACTATE, POTASSIUM CHLORIDE, AND CALCIUM CHLORIDE: .6; .31; .03; .02 INJECTION, SOLUTION INTRAVENOUS at 07:38

## 2023-08-28 RX ADMIN — PROPOFOL 120 MG: 10 INJECTION, EMULSION INTRAVENOUS at 07:41

## 2023-08-28 RX ADMIN — LIDOCAINE HYDROCHLORIDE 50 MG: 10 INJECTION, SOLUTION EPIDURAL; INFILTRATION; INTRACAUDAL; PERINEURAL at 07:41

## 2023-08-28 RX ADMIN — PROPOFOL 40 MG: 10 INJECTION, EMULSION INTRAVENOUS at 08:03

## 2023-08-28 RX ADMIN — PROPOFOL 40 MG: 10 INJECTION, EMULSION INTRAVENOUS at 07:44

## 2023-08-28 RX ADMIN — PROPOFOL 40 MG: 10 INJECTION, EMULSION INTRAVENOUS at 08:10

## 2023-08-28 RX ADMIN — PROPOFOL 40 MG: 10 INJECTION, EMULSION INTRAVENOUS at 08:07

## 2023-08-28 RX ADMIN — PROPOFOL 40 MG: 10 INJECTION, EMULSION INTRAVENOUS at 07:48

## 2023-08-28 RX ADMIN — PROPOFOL 40 MG: 10 INJECTION, EMULSION INTRAVENOUS at 07:59

## 2023-08-28 RX ADMIN — PROPOFOL 40 MG: 10 INJECTION, EMULSION INTRAVENOUS at 07:52

## 2023-08-28 RX ADMIN — PROPOFOL 40 MG: 10 INJECTION, EMULSION INTRAVENOUS at 07:55

## 2023-08-28 NOTE — H&P
History and Physical -  [facility]  Patric Vo 46 y.o. female MRN: [id number]                  HPI: Patric Vo is a 46y.o. year old female who presents for screening      REVIEW OF SYSTEMS: Per the HPI, and otherwise unremarkable.     Historical Information   Past Medical History:   Diagnosis Date   • Abnormal ECG April 2019    PVCs   • Abnormal Pap smear of cervix    • Otitis media 11/2019   • Skin tag    • Varicella      Past Surgical History:   Procedure Laterality Date   • MYOMECTOMY     • DC HYSTEROSCOPY BX ENDOMETRIUM&/POLYPC W/WO D&C N/A 09/03/2021    Procedure: DILATATION AND CURETTAGE (D&C) WITH HYSTEROSCOPY (;  Surgeon: Marylee Sayre, DO;  Location: [facility]IN [facility];  Service: Gynecology   • DC HYSTEROSCOPY BX ENDOMETRIUM&/POLYPC W/WO D&C N/A 09/03/2021    Procedure: resection to uterine pathology;  Surgeon: Marylee Sayre, DO;  Location: [facility];  Service: Gynecology     Social History   Social History     Substance and Sexual Activity   Alcohol Use Yes    Comment: ocassional, social     Social History     Substance and Sexual Activity   Drug Use No     Social History     Tobacco Use   Smoking Status Never   Smokeless Tobacco Never     Family History   Problem Relation Age of Onset   • Osteoarthritis Mother    • Hyperlipidemia Mother    • No Known Problems Father    • No Known Problems Sister    • Asthma Brother    • Migraines Daughter    • Heart attack Maternal Grandfather 48   • Vaginal cancer Maternal Aunt         unknown age   • No Known Problems Paternal Aunt    • Hypertension Neg Hx    • Coronary artery disease Neg Hx    • Diabetes Neg Hx    • Stroke Neg Hx    • Cancer Neg Hx    • Sudden death Neg Hx         SCD       Meds/Allergies       Current Outpatient Medications:   •  Multiple Vitamins-Minerals (Multi-Vitamin Gummies) CHEW  •  Slynd 4 MG TABS  •  tretinoin (RETIN-A) 0.025 % cream    No Known Allergies    Objective     /69   Pulse 82   Temp (!) 96.7 °F (35.9 °C) (Temporal)   Resp 18   Ht 5' 3" (1.6 m)   Wt 47.2 kg (104 lb)   LMP 08/26/2023 (Exact Date) Comment: pt sign waiver  SpO2 100%   BMI 18.42 kg/m²       PHYSICAL EXAM    Gen: NAD  Head: NCAT  CV: RRR  CHEST: Clear  ABD: soft, NT/ND  EXT: no edema      ASSESSMENT/PLAN:  This is a 46y.o. year old female here for Colonoscopy, and she is stable and optimized for her procedure.

## 2023-08-28 NOTE — ANESTHESIA POSTPROCEDURE EVALUATION
Post-Op Assessment Note    CV Status:  Stable    Pain management: adequate     Mental Status:  Alert and awake   Hydration Status:  Euvolemic   PONV Controlled:  Controlled   Airway Patency:  Patent      Post Op Vitals Reviewed: Yes      Staff: CRNA         No notable events documented.     BP (!) 87/50 (08/28/23 0820)    Temp 97.8 °F (36.6 °C) (08/28/23 0820)    Pulse 66 (08/28/23 0820)   Resp 15 (08/28/23 0820)    SpO2 99 % (08/28/23 0820)

## 2023-08-28 NOTE — ANESTHESIA PREPROCEDURE EVALUATION
Procedure:  COLONOSCOPY    Relevant Problems   CARDIO   (+) PVC (premature ventricular contraction)        Physical Exam    Airway    Mallampati score: III  TM Distance: >3 FB  Neck ROM: full     Dental       Cardiovascular  Cardiovascular exam normal    Pulmonary  Pulmonary exam normal     Other Findings        Anesthesia Plan  ASA Score- 2     Anesthesia Type- IV sedation with anesthesia with ASA Monitors. Additional Monitors:   Airway Plan:           Plan Factors-Exercise tolerance (METS): >4 METS. Chart reviewed. Existing labs reviewed. Patient summary reviewed. Patient is not a current smoker. Induction- intravenous. Postoperative Plan-     Informed Consent- Anesthetic plan and risks discussed with patient. I personally reviewed this patient with the CRNA. Discussed and agreed on the Anesthesia Plan with the CRNA. [place]            Chronic eczematous otitis externa of left ear   Diffuse cystic mastopathy   Endometrial polyp   Family history of ischemic heart disease (IHD)   Insomnia   Lymphadenopathy of head and neck   PVC (premature ventricular contraction)   Restless sleeper   S/P dilation and curettage   TMJ syndrome   Vitamin D deficiency

## 2023-08-30 PROCEDURE — 88305 TISSUE EXAM BY PATHOLOGIST: CPT | Performed by: PATHOLOGY

## 2023-09-13 ENCOUNTER — OFFICE VISIT (OUTPATIENT)
Dept: FAMILY MEDICINE CLINIC | Facility: CLINIC | Age: 48
End: 2023-09-13
Payer: COMMERCIAL

## 2023-09-13 ENCOUNTER — OFFICE VISIT (OUTPATIENT)
Dept: CARDIOLOGY CLINIC | Facility: CLINIC | Age: 48
End: 2023-09-13
Payer: COMMERCIAL

## 2023-09-13 VITALS
BODY MASS INDEX: 19.31 KG/M2 | HEART RATE: 84 BPM | WEIGHT: 109 LBS | DIASTOLIC BLOOD PRESSURE: 60 MMHG | SYSTOLIC BLOOD PRESSURE: 94 MMHG | HEIGHT: 63 IN | OXYGEN SATURATION: 100 %

## 2023-09-13 VITALS
BODY MASS INDEX: 19.31 KG/M2 | SYSTOLIC BLOOD PRESSURE: 100 MMHG | TEMPERATURE: 97.6 F | DIASTOLIC BLOOD PRESSURE: 58 MMHG | RESPIRATION RATE: 16 BRPM | OXYGEN SATURATION: 100 % | HEIGHT: 63 IN | WEIGHT: 109 LBS | HEART RATE: 91 BPM

## 2023-09-13 DIAGNOSIS — Z29.9 PREVENTIVE MEASURE: ICD-10-CM

## 2023-09-13 DIAGNOSIS — G47.00 INSOMNIA, UNSPECIFIED TYPE: Primary | ICD-10-CM

## 2023-09-13 DIAGNOSIS — I49.3 PVC (PREMATURE VENTRICULAR CONTRACTION): Primary | ICD-10-CM

## 2023-09-13 DIAGNOSIS — R40.0 DAYTIME SOMNOLENCE: ICD-10-CM

## 2023-09-13 DIAGNOSIS — R06.02 SOB (SHORTNESS OF BREATH): ICD-10-CM

## 2023-09-13 DIAGNOSIS — G47.9 RESTLESS SLEEPER: ICD-10-CM

## 2023-09-13 PROCEDURE — 93000 ELECTROCARDIOGRAM COMPLETE: CPT | Performed by: INTERNAL MEDICINE

## 2023-09-13 PROCEDURE — 99214 OFFICE O/P EST MOD 30 MIN: CPT | Performed by: INTERNAL MEDICINE

## 2023-09-13 PROCEDURE — 99214 OFFICE O/P EST MOD 30 MIN: CPT | Performed by: FAMILY MEDICINE

## 2023-09-13 RX ORDER — HYDROXYZINE HYDROCHLORIDE 25 MG/1
25 TABLET, FILM COATED ORAL
Qty: 30 TABLET | Refills: 2 | Status: SHIPPED | OUTPATIENT
Start: 2023-09-13

## 2023-09-13 NOTE — PROGRESS NOTES
Cardiology Consultation     Zach Galicia  [id number]  1975  [facility] [address] [address]    1. PVC (premature ventricular contraction)  POCT ECG    Echo complete w/ contrast if indicated    Stress test only, exercise      2. SOB (shortness of breath)  Echo complete w/ contrast if indicated    Stress test only, exercise        Chief Complaint   Patient presents with   • Follow-up   • Shortness of Breath     At rest , pt states some fluids in lungs      HPI: Patient has noted some increased shortness of breath more recently. Seems to be more when she lies down, sleeps on 2 pillows, but not changed in past couple years. Not worse with exertion. No reported chest pain, palpitations, lightheadedness, syncope, LE edema, orthopnea, PND, or significant weight changes. Patient remains active without any increased fatigue out of the ordinary.       Patient Active Problem List   Diagnosis   • Vitamin D deficiency   • Family history of ischemic heart disease (IHD)   • Diffuse cystic mastopathy   • PVC (premature ventricular contraction)   • Insomnia   • Lymphadenopathy of head and neck   • Chronic eczematous otitis externa of left ear   • Restless sleeper   • TMJ syndrome   • Endometrial polyp   • S/P dilation and curettage   • SOB (shortness of breath)     Past Medical History:   Diagnosis Date   • Abnormal ECG April 2019    PVCs   • Abnormal Pap smear of cervix    • Otitis media 11/2019   • Skin tag    • Varicella      Social History     Socioeconomic History   • Marital status: /Civil Union     Spouse name: Not on file   • Number of children: 2   • Years of education: Not on file   • Highest education level: Not on file   Occupational History   • Not on file   Tobacco Use   • Smoking status: Never   • Smokeless tobacco: Never   Vaping Use   • Vaping Use: Never used   Substance and Sexual Activity   • Alcohol use: Yes     Comment: ocassional, social   • Drug use: No   • Sexual activity: Yes     Partners: Male     Birth control/protection: Male Sterilization     Comment: Vasectomy   Other Topics Concern   • Not on file   Social History Narrative    Exercise habits:     Social Determinants of Health     Financial Resource Strain: Not on file   Food Insecurity: Not on file   Transportation Needs: Not on file   Physical Activity: Not on file   Stress: Not on file   Social Connections: Not on file   Intimate Partner Violence: Not on file   Housing Stability: Not on file      Family History   Problem Relation Age of Onset   • Osteoarthritis Mother    • Hyperlipidemia Mother    • No Known Problems Father    • No Known Problems Sister    • Asthma Brother    • Migraines Daughter    • Heart attack Maternal Grandfather 48   • Vaginal cancer Maternal Aunt         unknown age   • No Known Problems Paternal Aunt    • Hypertension Neg Hx    • Coronary artery disease Neg Hx    • Diabetes Neg Hx    • Stroke Neg Hx    • Cancer Neg Hx    • Sudden death Neg Hx         SCD     Past Surgical History:   Procedure Laterality Date   • MYOMECTOMY     • MS HYSTEROSCOPY BX ENDOMETRIUM&/POLYPC W/WO D&C N/A 09/03/2021    Procedure: DILATATION AND CURETTAGE (D&C) WITH HYSTEROSCOPY (;  Surgeon: Freda Quick DO;  Location: [facility];  Service: Gynecology   • MS HYSTEROSCOPY BX ENDOMETRIUM&/POLYPC W/WO D&C N/A 09/03/2021    Procedure: resection to uterine pathology;  Surgeon: Freda Quick DO;  Location: [facility];  Service: Gynecology       Current Outpatient Medications:   •  Multiple Vitamins-Minerals (Multi-Vitamin Gummies) CHEW, Chew daily Juice's multivitamin, Disp: , Rfl:   •  Slynd 4 MG TABS, TAKE 1 TABLET BY MOUTH EVERYDAY AT BEDTIME, Disp: 84 tablet, Rfl: 4  •  tretinoin (RETIN-A) 0.025 % cream, Apply topically daily at bedtime Please specify directions, refills and quantity, Disp: 45 g, Rfl: 2  No Known Allergies  Vitals:    09/13/23 0900   BP: 94/60   BP Location: Left arm   Patient Position: Sitting   Cuff Size: Standard   Pulse: 84   SpO2: 100%   Weight: 49.4 kg (109 lb)   Height: 5' 3" (1.6 m)       Labs:  Hospital Outpatient Visit on 08/28/2023   Component Date Value   • Case Report 08/28/2023                      Value:Surgical Pathology Report                         Case: [id number]                                   Authorizing Provider:  Bonifacio Ko MD         Collected:           08/28/2023 0757              Ordering Location:     [address] [place] [address]        Received:            08/28/2023 [id number] [facility]                                                    Pathologist:           Mitch Kyle MD                                                         Specimen:    Polyp, Colorectal, cecal polyp hot snare                                                  • Final Diagnosis 08/28/2023                      Value: This result contains rich text formatting which cannot be displayed here. • Additional Information 08/28/2023                      Value: This result contains rich text formatting which cannot be displayed here. • Synoptic Checklist 08/28/2023                      Value:                            COLON/RECTUM POLYP FORM - GI - All Specimens                                                                                     :    Adenoma(s)     • Gross Description 08/28/2023                      Value: This result contains rich text formatting which cannot be displayed here. • Clinical Information 08/28/2023                      Value:8 ml Saline lift  2 clips     Lab Results   Component Value Date    TRIG 93 10/28/2022    HDL 58 10/28/2022     Imaging: No results found. Review of Systems:  Review of Systems   Constitutional: Negative for activity change, appetite change, chills, diaphoresis, fatigue and unexpected weight change.    HENT: Negative for hearing loss, nosebleeds and sore throat. Eyes: Negative for photophobia and visual disturbance. Respiratory: Positive for shortness of breath. Negative for cough, chest tightness and wheezing. Cardiovascular: Negative for chest pain, palpitations and leg swelling. Gastrointestinal: Negative for abdominal pain, diarrhea, nausea and vomiting. Endocrine: Negative for polyuria. Genitourinary: Negative for dysuria, frequency and hematuria. Musculoskeletal: Negative for arthralgias, back pain, gait problem and neck pain. Skin: Negative for pallor and rash. Neurological: Negative for dizziness, syncope and headaches. Hematological: Does not bruise/bleed easily. Psychiatric/Behavioral: Negative for behavioral problems and confusion. Physical Exam:  Physical Exam  Vitals reviewed. Constitutional:       Appearance: She is well-developed. She is not diaphoretic. HENT:      Head: Normocephalic and atraumatic. Nose: Nose normal.   Eyes:      General: No scleral icterus. Pupils: Pupils are equal, round, and reactive to light. Neck:      Vascular: No JVD. Cardiovascular:      Rate and Rhythm: Normal rate and regular rhythm. Heart sounds: Normal heart sounds. No murmur heard. No friction rub. No gallop. Pulmonary:      Effort: Pulmonary effort is normal. No respiratory distress. Breath sounds: Normal breath sounds. No wheezing or rales. Abdominal:      General: Bowel sounds are normal. There is no distension. Palpations: Abdomen is soft. Tenderness: There is no abdominal tenderness. Musculoskeletal:         General: No deformity. Normal range of motion. Cervical back: Normal range of motion and neck supple. Skin:     General: Skin is warm and dry. Findings: No rash. Neurological:      Mental Status: She is alert and oriented to person, place, and time. Cranial Nerves: No cranial nerve deficit.    Psychiatric: Behavior: Behavior normal.     Blood pressure 94/60, pulse 84, height 5' 3" (1.6 m), weight 49.4 kg (109 lb), last menstrual period 08/26/2023, SpO2 100 %, not currently breastfeeding. EKG:  Normal sinus rhythm  Normal ECG    Discussion/Summary:  PVCs: seems to be well controlled currently. Not currently on any medical therapy for this. Would continue her reduced caffeine intake and increasing water intake with electrolytes. Echo to make sure there are no effects on her LV morphology revealed normal structure and function of her heart. Now with new shortness of breath, so we will repeat echocardiogram and will also check stress test to ensure no ischemic disease.

## 2023-09-13 NOTE — PROGRESS NOTES
Assessment/Plan:       Problem List Items Addressed This Visit        Other    Insomnia - Primary     Home sleep study ordered. She will schedule this. Check lab work as noted. She did not do well with trazodone. We will try hydroxyzine 25 mg at night as needed for sleep         Relevant Medications    hydrOXYzine HCL (ATARAX) 25 mg tablet    Other Relevant Orders    Home Study    Comprehensive metabolic panel    TSH, 3rd generation with Free T4 reflex    CBC and differential    Restless sleeper    Relevant Orders    Home Study   Other Visit Diagnoses     Daytime somnolence        Relevant Orders    Home Study    TSH, 3rd generation with Free T4 reflex    CBC and differential    Preventive measure        Relevant Orders    Lipid Panel with Direct LDL reflex    Hemoglobin A1C                   Subjective:     Jessica Elder is a 46 y.o. female here today with chief complaint below:  Chief Complaint   Patient presents with   • Insomnia     Not sleeping      - CC above per clinical staff and reviewed. HPI:    Pt here for concerns with insomnia. Wonders about home sleep study. Wakes several times at night. Not sure if she snores. No morning HA.  +daytime somnolence. Sleep is not refreshing. Doesn't wake up and feel well, despite how long she sleeps. Not sure if she snores. She had been recommended to get a sleep study in the past but has not done so yet. Even if she takes a nap, she will wake again in 20 mins. Can't stay asleep during naps or during nighttime sleep. Wakes at night sometimes five times that she is aware of. Typically wakes 2-5 times at night each night. Sometimes has to urinate when she gets up, but not all the time. Sleep trouble since her 45s. Getting worse. No falling asleep watching tv or meeting. No falling asleep in car. She does note that sometimes she is ruminating at night, some anxiety at night that contributes to sleep trouble.     She did not do well with trazodone in the past.    The following portions of the patient's history were reviewed and updated as appropriate: allergies, current medications, past family history, past medical history, past social history, past surgical history and problem list.    ROS:  Review of Systems     Objective:      /58   Pulse 91   Temp 97.6 °F (36.4 °C)   Resp 16   Ht 5' 3" (1.6 m)   Wt 49.4 kg (109 lb)   LMP 08/26/2023 (Exact Date) Comment: pt sign waiver  SpO2 100%   BMI 19.31 kg/m²   BP Readings from Last 3 Encounters:   09/13/23 100/58   09/13/23 94/60   08/28/23 99/65     Wt Readings from Last 3 Encounters:   09/13/23 49.4 kg (109 lb)   09/13/23 49.4 kg (109 lb)   08/28/23 47.2 kg (104 lb)               Physical Exam:   Physical Exam  Vitals and nursing note reviewed. Constitutional:       Appearance: Normal appearance. She is not ill-appearing. Cardiovascular:      Rate and Rhythm: Normal rate and regular rhythm. Heart sounds: No murmur heard. Pulmonary:      Effort: Pulmonary effort is normal.      Breath sounds: Normal breath sounds. No wheezing or rhonchi. Neurological:      Mental Status: She is alert and oriented to person, place, and time.    Psychiatric:         Mood and Affect: Mood normal.         Behavior: Behavior normal.

## 2023-09-13 NOTE — ASSESSMENT & PLAN NOTE
Home sleep study ordered. She will schedule this. Check lab work as noted. She did not do well with trazodone.   We will try hydroxyzine 25 mg at night as needed for sleep

## 2023-09-22 ENCOUNTER — APPOINTMENT (OUTPATIENT)
Dept: LAB | Facility: CLINIC | Age: 48
End: 2023-09-22
Payer: COMMERCIAL

## 2023-09-22 DIAGNOSIS — Z29.9 PREVENTIVE MEASURE: ICD-10-CM

## 2023-09-22 DIAGNOSIS — R40.0 DAYTIME SOMNOLENCE: ICD-10-CM

## 2023-09-22 DIAGNOSIS — G47.00 INSOMNIA, UNSPECIFIED TYPE: ICD-10-CM

## 2023-09-22 LAB
ALBUMIN SERPL BCP-MCNC: 4.2 G/DL (ref 3.5–5)
ALP SERPL-CCNC: 38 U/L (ref 34–104)
ALT SERPL W P-5'-P-CCNC: 11 U/L (ref 7–52)
ANION GAP SERPL CALCULATED.3IONS-SCNC: 9 MMOL/L
AST SERPL W P-5'-P-CCNC: 16 U/L (ref 13–39)
BASOPHILS # BLD AUTO: 0.08 THOUSANDS/ÂΜL (ref 0–0.1)
BASOPHILS NFR BLD AUTO: 2 % (ref 0–1)
BILIRUB SERPL-MCNC: 0.74 MG/DL (ref 0.2–1)
BUN SERPL-MCNC: 12 MG/DL (ref 5–25)
CALCIUM SERPL-MCNC: 8.9 MG/DL (ref 8.4–10.2)
CHLORIDE SERPL-SCNC: 105 MMOL/L (ref 96–108)
CHOLEST SERPL-MCNC: 189 MG/DL
CO2 SERPL-SCNC: 24 MMOL/L (ref 21–32)
CREAT SERPL-MCNC: 1.01 MG/DL (ref 0.6–1.3)
EOSINOPHIL # BLD AUTO: 0.18 THOUSAND/ÂΜL (ref 0–0.61)
EOSINOPHIL NFR BLD AUTO: 4 % (ref 0–6)
ERYTHROCYTE [DISTWIDTH] IN BLOOD BY AUTOMATED COUNT: 13.8 % (ref 11.6–15.1)
EST. AVERAGE GLUCOSE BLD GHB EST-MCNC: 117 MG/DL
GFR SERPL CREATININE-BSD FRML MDRD: 65 ML/MIN/1.73SQ M
GLUCOSE P FAST SERPL-MCNC: 77 MG/DL (ref 65–99)
HBA1C MFR BLD: 5.7 %
HCT VFR BLD AUTO: 40 % (ref 34.8–46.1)
HDLC SERPL-MCNC: 62 MG/DL
HGB BLD-MCNC: 12.3 G/DL (ref 11.5–15.4)
IMM GRANULOCYTES # BLD AUTO: 0.02 THOUSAND/UL (ref 0–0.2)
IMM GRANULOCYTES NFR BLD AUTO: 0 % (ref 0–2)
LDLC SERPL CALC-MCNC: 113 MG/DL (ref 0–100)
LYMPHOCYTES # BLD AUTO: 1.38 THOUSANDS/ÂΜL (ref 0.6–4.47)
LYMPHOCYTES NFR BLD AUTO: 28 % (ref 14–44)
MCH RBC QN AUTO: 29 PG (ref 26.8–34.3)
MCHC RBC AUTO-ENTMCNC: 30.8 G/DL (ref 31.4–37.4)
MCV RBC AUTO: 94 FL (ref 82–98)
MONOCYTES # BLD AUTO: 0.61 THOUSAND/ÂΜL (ref 0.17–1.22)
MONOCYTES NFR BLD AUTO: 13 % (ref 4–12)
NEUTROPHILS # BLD AUTO: 2.6 THOUSANDS/ÂΜL (ref 1.85–7.62)
NEUTS SEG NFR BLD AUTO: 53 % (ref 43–75)
NRBC BLD AUTO-RTO: 0 /100 WBCS
PLATELET # BLD AUTO: 278 THOUSANDS/UL (ref 149–390)
PMV BLD AUTO: 9.7 FL (ref 8.9–12.7)
POTASSIUM SERPL-SCNC: 4.3 MMOL/L (ref 3.5–5.3)
PROT SERPL-MCNC: 6.9 G/DL (ref 6.4–8.4)
RBC # BLD AUTO: 4.24 MILLION/UL (ref 3.81–5.12)
SODIUM SERPL-SCNC: 138 MMOL/L (ref 135–147)
TRIGL SERPL-MCNC: 68 MG/DL
TSH SERPL DL<=0.05 MIU/L-ACNC: 2.08 UIU/ML (ref 0.45–4.5)
WBC # BLD AUTO: 4.87 THOUSAND/UL (ref 4.31–10.16)

## 2023-09-22 PROCEDURE — 84443 ASSAY THYROID STIM HORMONE: CPT

## 2023-09-22 PROCEDURE — 83036 HEMOGLOBIN GLYCOSYLATED A1C: CPT

## 2023-09-22 PROCEDURE — 85025 COMPLETE CBC W/AUTO DIFF WBC: CPT

## 2023-09-22 PROCEDURE — 80061 LIPID PANEL: CPT

## 2023-09-22 PROCEDURE — 80053 COMPREHEN METABOLIC PANEL: CPT

## 2023-09-22 PROCEDURE — 36415 COLL VENOUS BLD VENIPUNCTURE: CPT

## 2023-10-03 ENCOUNTER — HOSPITAL ENCOUNTER (OUTPATIENT)
Dept: NON INVASIVE DIAGNOSTICS | Facility: HOSPITAL | Age: 48
Discharge: HOME | End: 2023-10-03
Attending: INTERNAL MEDICINE
Payer: COMMERCIAL

## 2023-10-03 VITALS
WEIGHT: 109 LBS | DIASTOLIC BLOOD PRESSURE: 60 MMHG | HEART RATE: 100 BPM | BODY MASS INDEX: 19.31 KG/M2 | SYSTOLIC BLOOD PRESSURE: 90 MMHG | HEIGHT: 63 IN

## 2023-10-03 DIAGNOSIS — I49.3 PVC (PREMATURE VENTRICULAR CONTRACTION): ICD-10-CM

## 2023-10-03 DIAGNOSIS — R06.02 SOB (SHORTNESS OF BREATH): ICD-10-CM

## 2023-10-03 LAB
CHEST PAIN STATEMENT: NORMAL
MAX DIASTOLIC BP: 74 MMHG
MAX HEART RATE: 173 BPM
MAX PREDICTED HEART RATE: 170 BPM
MAX. SYSTOLIC BP: 140 MMHG
PROTOCOL NAME: NORMAL
TARGET HR FORMULA: NORMAL
TEST INDICATION: NORMAL
TIME IN EXERCISE PHASE: NORMAL

## 2023-10-03 PROCEDURE — 93306 TTE W/DOPPLER COMPLETE: CPT

## 2023-10-03 PROCEDURE — 93017 CV STRESS TEST TRACING ONLY: CPT

## 2023-10-05 DIAGNOSIS — G47.00 INSOMNIA, UNSPECIFIED TYPE: ICD-10-CM

## 2023-10-05 LAB
AORTIC ROOT: 2.6 CM
APICAL FOUR CHAMBER EJECTION FRACTION: 64 %
E WAVE DECELERATION TIME: 118 MS
FRACTIONAL SHORTENING: 31 (ref 28–44)
INTERVENTRICULAR SEPTUM IN DIASTOLE (PARASTERNAL SHORT AXIS VIEW): 0.6 CM
INTERVENTRICULAR SEPTUM: 0.6 CM (ref 0.6–1.1)
LAAS-AP2: 7.8 CM2
LAAS-AP4: 13.5 CM2
LEFT ATRIUM SIZE: 2.1 CM
LEFT ATRIUM VOLUME (MOD BIPLANE): 26 ML
LEFT INTERNAL DIMENSION IN SYSTOLE: 2.5 CM (ref 2.1–4)
LEFT VENTRICULAR INTERNAL DIMENSION IN DIASTOLE: 3.6 CM (ref 3.5–6)
LEFT VENTRICULAR POSTERIOR WALL IN END DIASTOLE: 0.7 CM
LEFT VENTRICULAR STROKE VOLUME: 32 ML
LVSV (TEICH): 32 ML
MAX HR PERCENT: 101 %
MAX HR: 173 BPM
MV E'TISSUE VEL-LAT: 6 CM/S
MV E'TISSUE VEL-SEP: 8 CM/S
MV PEAK A VEL: 0.54 M/S
MV PEAK E VEL: 67 CM/S
MV STENOSIS PRESSURE HALF TIME: 34 MS
MV VALVE AREA P 1/2 METHOD: 6.47
RATE PRESSURE PRODUCT: NORMAL
RIGHT ATRIUM AREA SYSTOLE A4C: 8.8 CM2
RIGHT VENTRICLE ID DIMENSION: 2.9 CM
SL CV LEFT ATRIUM LENGTH A2C: 3 CM
SL CV LV EF: 60
SL CV PED ECHO LEFT VENTRICLE DIASTOLIC VOLUME (MOD BIPLANE) 2D: 55 ML
SL CV PED ECHO LEFT VENTRICLE SYSTOLIC VOLUME (MOD BIPLANE) 2D: 23 ML
SL CV STRESS RECOVERY BP: NORMAL MMHG
SL CV STRESS RECOVERY HR: 105 BPM
SL CV STRESS RECOVERY O2 SAT: 98 %
SL CV STRESS STAGE REACHED: 4
STRESS ANGINA INDEX: 0
STRESS BASELINE BP: NORMAL MMHG
STRESS BASELINE HR: 97 BPM
STRESS O2 SAT REST: 96 %
STRESS PEAK HR: 171 BPM
STRESS POST ESTIMATED WORKLOAD: 13.4 METS
STRESS POST EXERCISE DUR MIN: 10 MIN
STRESS POST O2 SAT PEAK: 96 %
STRESS POST PEAK BP: 150 MMHG
TRICUSPID ANNULAR PLANE SYSTOLIC EXCURSION: 2 CM

## 2023-10-05 PROCEDURE — 93016 CV STRESS TEST SUPVJ ONLY: CPT | Performed by: INTERNAL MEDICINE

## 2023-10-05 PROCEDURE — 93018 CV STRESS TEST I&R ONLY: CPT | Performed by: INTERNAL MEDICINE

## 2023-10-05 PROCEDURE — 93306 TTE W/DOPPLER COMPLETE: CPT | Performed by: INTERNAL MEDICINE

## 2023-10-05 RX ORDER — HYDROXYZINE HYDROCHLORIDE 25 MG/1
25 TABLET, FILM COATED ORAL
Qty: 90 TABLET | Refills: 1 | Status: SHIPPED | OUTPATIENT
Start: 2023-10-05

## 2023-10-06 ENCOUNTER — TELEPHONE (OUTPATIENT)
Dept: CARDIOLOGY CLINIC | Facility: CLINIC | Age: 48
End: 2023-10-06

## 2023-10-06 ENCOUNTER — TELEPHONE (OUTPATIENT)
Age: 48
End: 2023-10-06

## 2023-10-06 NOTE — TELEPHONE ENCOUNTER
Patient needs to reschedule her nurse visit for after 10/25 for shingles vaccine.  I did not see any available slots if someone could please assist   She would also like a print out of her immunizations and wants to pick them up this morning around 11 am

## 2023-10-13 ENCOUNTER — TELEPHONE (OUTPATIENT)
Dept: CARDIOLOGY CLINIC | Facility: CLINIC | Age: 48
End: 2023-10-13

## 2023-10-25 ENCOUNTER — CLINICAL SUPPORT (OUTPATIENT)
Dept: FAMILY MEDICINE CLINIC | Facility: CLINIC | Age: 48
End: 2023-10-25
Payer: COMMERCIAL

## 2023-10-25 DIAGNOSIS — Z23 ENCOUNTER FOR IMMUNIZATION: Primary | ICD-10-CM

## 2023-10-25 PROCEDURE — 90471 IMMUNIZATION ADMIN: CPT

## 2023-10-25 PROCEDURE — 90750 HZV VACC RECOMBINANT IM: CPT

## 2023-11-03 ENCOUNTER — TELEPHONE (OUTPATIENT)
Age: 48
End: 2023-11-03

## 2023-11-03 NOTE — TELEPHONE ENCOUNTER
Rec'd call from patient requesting to schedule Botox & Filler with Dr. James Kern. (Patient unable to do tomorrow in [facility].) Scheduled for 12/2/2023 @ [facility] (double slot per patient to allow enough time for both - issue in the past maybe?)    She's also requesting:    REFILL of RETIN-A 0.025% CREAM    (I informed patient that last refill was Dr. Matt Hines. Patient states that her box states Dr. Manuel Fernandez.  Refill was sent to plastics due to patient not having seen derm in over one year.)

## 2023-12-02 ENCOUNTER — COSMETIC (OUTPATIENT)
Dept: PLASTIC SURGERY | Facility: CLINIC | Age: 48
End: 2023-12-02

## 2023-12-02 DIAGNOSIS — Z41.1 ENCOUNTER FOR COSMETIC PROCEDURE: ICD-10-CM

## 2023-12-02 DIAGNOSIS — L70.9 ACNE, UNSPECIFIED ACNE TYPE: Primary | ICD-10-CM

## 2023-12-02 DIAGNOSIS — Z09 AFTERCARE INVOLVING THE USE OF PLASTIC SURGERY: ICD-10-CM

## 2023-12-02 PROCEDURE — BOTOX1U PR BOTOX BY THE UNIT: Performed by: STUDENT IN AN ORGANIZED HEALTH CARE EDUCATION/TRAINING PROGRAM

## 2023-12-02 PROCEDURE — FILLER1 JUVEDERM/RESTYLANE 1 SYRINGE: Performed by: STUDENT IN AN ORGANIZED HEALTH CARE EDUCATION/TRAINING PROGRAM

## 2023-12-02 PROCEDURE — RESTY1: Performed by: STUDENT IN AN ORGANIZED HEALTH CARE EDUCATION/TRAINING PROGRAM

## 2023-12-02 PROCEDURE — RECHECK: Performed by: STUDENT IN AN ORGANIZED HEALTH CARE EDUCATION/TRAINING PROGRAM

## 2023-12-02 PROCEDURE — BOTOX1 ONE AREA OR 25 UNITS: Performed by: STUDENT IN AN ORGANIZED HEALTH CARE EDUCATION/TRAINING PROGRAM

## 2023-12-02 NOTE — PROGRESS NOTES
Botox Consult     First time?: no, patient had botox with me before   Allergies: none  Blood thinners: none   Pregnant: none     Patient has had botox in the past, more recently with Dr Blake Contreras, interested in maintenance. Particular areas of concern: crows feet. Concerned about exacerbation of jelly roll areas. Will proceed with 10 units of botox, 5 on each crows foot. Discussed that this is conservative dosage. Patient ok and acknowledged. Also, patient would like tear trough filler. Discussed risks, complications including intra-vascular injection which can rarely result in blindness, skin necrosis. Patient acknowledged. Risks and benefits discussed, patient agreed to proceed.      5 units to each crows feet  1 syringe of restalyn split between right and left tear trough deformities     Total 10 units, 1 syringe of restalyn, employee     Patient tolerated well, f/u in 3 months        Terrance Session, MD   [facility]   [facility]   Office: [phone]

## 2023-12-04 RX ORDER — TRETINOIN 0.5 MG/G
CREAM TOPICAL
Qty: 20 G | Refills: 1 | Status: SHIPPED | OUTPATIENT
Start: 2023-12-04

## 2023-12-04 RX ORDER — TRETINOIN 0.5 MG/G
CREAM TOPICAL
Qty: 20 G | Refills: 1 | Status: SHIPPED | OUTPATIENT
Start: 2023-12-04 | End: 2023-12-04

## 2023-12-12 DIAGNOSIS — Z00.6 ENCOUNTER FOR EXAMINATION FOR NORMAL COMPARISON OR CONTROL IN CLINICAL RESEARCH PROGRAM: ICD-10-CM

## 2023-12-18 ENCOUNTER — APPOINTMENT (OUTPATIENT)
Dept: LAB | Facility: AMBULARY SURGERY CENTER | Age: 48
End: 2023-12-18

## 2023-12-18 DIAGNOSIS — Z00.6 ENCOUNTER FOR EXAMINATION FOR NORMAL COMPARISON OR CONTROL IN CLINICAL RESEARCH PROGRAM: ICD-10-CM

## 2023-12-18 PROCEDURE — 36415 COLL VENOUS BLD VENIPUNCTURE: CPT

## 2023-12-27 ENCOUNTER — TELEPHONE (OUTPATIENT)
Age: 48
End: 2023-12-27

## 2023-12-27 NOTE — TELEPHONE ENCOUNTER
Patient called returning Elizabeth's call. She is not able to make 12/28 as she is in [place]. Will keep 1/3.

## 2024-01-01 NOTE — PROGRESS NOTES
Botulinum Toxin Injection Procedure Note    Procedure: Cosmetic botulinum toxin administration    Date of procedure: 10/23/2018    Pre-operative Diagnosis: Dynamic rhytides    Post-operative Diagnosis: Same    Surgeon: Violet Vu MD    Complications:  None    Brief history: Ros Jaffe desires botulinum toxin injection of her crew's feet area  I discussed with the patient this proposed procedure of botulinum toxin injections, which is customized depending on the particular needs of the patient  It is performed on facial rhytids as a temporary correction  The alternatives were discussed with the patient  The risks were addressed including bleeding, scarring, infection, damage to deeper structures, asymmetry, and chronic pain, which may occur infrequently after a procedure  The individual's choice to undergo a surgical procedure is based on the comparison of risks to potential benefits  Other risks include unsatisfactory results, brow ptosis, eyelid ptosis, allergic reaction, temporary paralysis, which should go away with time, bruising, blurring disturbances and delayed healing  Botulinum toxin injections do not arrest the aging process or produce permanent tightening of the eyelid  Operative intervention maybe necessary to maintain the results of a blepharoplasty or botulinum toxin  The patient understands and wishes to proceed  An informed consent was signed and informational brochures given to her prior to the procedure  Procedure: The area was prepped with alcohol and dried with a clean gauze  Using a clean technique, the botulinum toxin was diluted with 4 cc of preservative-free normal saline which was slowly injected with an 18 gauge needle in 1 cc tuberculin syringes  Subsequently 30 gauge needles were used to inject the botulinum toxin  This mixture allow for an aliquot of 2 5 units per 0 1 cc in each injection site  Crew's feet bilateral: 15 units     No complications were noted  Light pressure was held for 5 minutes  She was instructed explicitly in post-operative care    Total botox units injected: 15 Offered, feeding was successful

## 2024-01-03 ENCOUNTER — COSMETIC (OUTPATIENT)
Dept: PLASTIC SURGERY | Facility: CLINIC | Age: 49
End: 2024-01-03

## 2024-01-03 DIAGNOSIS — Z41.1 ENCOUNTER FOR COSMETIC SURGERY: Primary | ICD-10-CM

## 2024-01-03 DIAGNOSIS — Z41.1 ENCOUNTER FOR COSMETIC PROCEDURE: ICD-10-CM

## 2024-01-03 PROCEDURE — RECHECK: Performed by: STUDENT IN AN ORGANIZED HEALTH CARE EDUCATION/TRAINING PROGRAM

## 2024-01-03 NOTE — PROGRESS NOTES
PRS Note    Patient seen and examined.    Filler is appropriate. Discussed risks of dissolving with hylanex. Will hold off on hylanex.    Skinny Solis MD   [facility]   [address] [place] [address], [address]   [place] [address]   Office: [phone]

## 2024-01-22 LAB
APOB+LDLR+PCSK9 GENE MUT ANL BLD/T: NOT DETECTED
BRCA1+BRCA2 DEL+DUP + FULL MUT ANL BLD/T: NOT DETECTED
MLH1+MSH2+MSH6+PMS2 GN DEL+DUP+FUL M: NOT DETECTED

## 2024-02-07 DIAGNOSIS — G47.00 INSOMNIA, UNSPECIFIED TYPE: ICD-10-CM

## 2024-02-07 DIAGNOSIS — R06.83 SNORING: ICD-10-CM

## 2024-02-07 DIAGNOSIS — G47.33 OSA (OBSTRUCTIVE SLEEP APNEA): Primary | ICD-10-CM

## 2024-02-08 ENCOUNTER — HOSPITAL ENCOUNTER (OUTPATIENT)
Dept: RADIOLOGY | Facility: MEDICAL CENTER | Age: 49
Discharge: HOME | End: 2024-02-08
Payer: COMMERCIAL

## 2024-02-08 VITALS — HEIGHT: 63 IN | BODY MASS INDEX: 19.31 KG/M2 | WEIGHT: 109 LBS

## 2024-02-08 DIAGNOSIS — Z12.31 ENCOUNTER FOR SCREENING MAMMOGRAM FOR MALIGNANT NEOPLASM OF BREAST: ICD-10-CM

## 2024-02-08 PROCEDURE — 77067 SCR MAMMO BI INCL CAD: CPT

## 2024-02-08 PROCEDURE — 77063 BREAST TOMOSYNTHESIS BI: CPT

## 2024-03-04 ENCOUNTER — COSMETIC (OUTPATIENT)
Dept: PLASTIC SURGERY | Facility: CLINIC | Age: 49
End: 2024-03-04

## 2024-03-04 DIAGNOSIS — Z41.1 ENCOUNTER FOR COSMETIC PROCEDURE: Primary | ICD-10-CM

## 2024-03-04 PROCEDURE — BOTOX2 TWO AREAS OR 50 UNITS: Performed by: STUDENT IN AN ORGANIZED HEALTH CARE EDUCATION/TRAINING PROGRAM

## 2024-03-04 PROCEDURE — BOTOX1U PR BOTOX BY THE UNIT: Performed by: STUDENT IN AN ORGANIZED HEALTH CARE EDUCATION/TRAINING PROGRAM

## 2024-03-04 NOTE — PROGRESS NOTES
Botox Consult     First time?: no, patient had botox with me before in past   Allergies: none  Blood thinners: none   Pregnant: none     Patient has had botox in the past, more recently with Dr Dillon, interested in maintenance.      Will proceed with 20 units. Discussed that this is conservative dosage. Patient ok and acknowledged.     Risks and benefits discussed, patient agreed to proceed.     12 units to glabellum  6 units to central forehead  2 units to lateral forehead     Total 20 units, 30% off employee     Patient tolerated well, f/u in 3 months        Skinny Solis MD   [facility]   [address] [place] [address], [address]   [place] [address]   Office: [phone]

## 2024-03-06 ENCOUNTER — TELEPHONE (OUTPATIENT)
Age: 49
End: 2024-03-06

## 2024-03-06 DIAGNOSIS — N93.9 ABNORMAL UTERINE BLEEDING (AUB): Primary | ICD-10-CM

## 2024-03-06 RX ORDER — ACETAMINOPHEN AND CODEINE PHOSPHATE 120; 12 MG/5ML; MG/5ML
1 SOLUTION ORAL DAILY
Qty: 84 TABLET | Refills: 1 | Status: SHIPPED | OUTPATIENT
Start: 2024-03-06

## 2024-03-06 NOTE — TELEPHONE ENCOUNTER
Pt called stating she was at [facility] 2x and [facility] reached out both times about the medication Slynd no longer being covered under the pt's current insurance. Pt needs an alternate medication order covered by insurance placed with [facility]. She is currently out of the medication.    Medication: Slynd    Dose/Frequency: One tab daily at bedtime    Quantity: 4 MG tabs    Pharmacy: [id number] - [place] - [address] [facility]     Office:   [] PCP/Provider -   [x] Speciality/Provider -     Does the patient have enough for 3 days?   [] Yes   [x] No - Send as HP to POD

## 2024-03-06 NOTE — TELEPHONE ENCOUNTER
Patient states she would like to try Micronor, script can be sent to patients pharmacy in chart. She is aware of the possibility of irregular bleeding/ breakthrough bleeding. If you could please sent enough refills until her appointment with Dr. Muller.

## 2024-03-06 NOTE — TELEPHONE ENCOUNTER
There is micronor which is norethindrone. We can send some in and recommend to discuss at her upcoming appointment with Dr. Muller in May. Irregular bleeding can occur when switching progesterone pills and breakthrough bleeding may occur as well. She should also look into a discount coupon through [email or web address] if it was working well for her. Let me know if she needs meds sent in.

## 2024-04-18 ENCOUNTER — HOSPITAL ENCOUNTER (OUTPATIENT)
Dept: SLEEP CENTER | Facility: CLINIC | Age: 49
Discharge: HOME | End: 2024-04-18

## 2024-04-18 DIAGNOSIS — G47.33 OSA (OBSTRUCTIVE SLEEP APNEA): ICD-10-CM

## 2024-04-18 DIAGNOSIS — R06.83 SNORING: ICD-10-CM

## 2024-04-18 DIAGNOSIS — G47.00 INSOMNIA, UNSPECIFIED TYPE: ICD-10-CM

## 2024-05-31 ENCOUNTER — OFFICE VISIT (OUTPATIENT)
Dept: OBGYN CLINIC | Facility: CLINIC | Age: 49
End: 2024-05-31
Payer: COMMERCIAL

## 2024-05-31 VITALS
HEIGHT: 63 IN | SYSTOLIC BLOOD PRESSURE: 100 MMHG | WEIGHT: 108 LBS | BODY MASS INDEX: 19.14 KG/M2 | DIASTOLIC BLOOD PRESSURE: 74 MMHG

## 2024-05-31 DIAGNOSIS — Z12.31 ENCOUNTER FOR SCREENING MAMMOGRAM FOR MALIGNANT NEOPLASM OF BREAST: ICD-10-CM

## 2024-05-31 DIAGNOSIS — N92.6 IRREGULAR BLEEDING: ICD-10-CM

## 2024-05-31 DIAGNOSIS — Z01.419 ENCOUNTER FOR GYNECOLOGICAL EXAMINATION WITHOUT ABNORMAL FINDING: Primary | ICD-10-CM

## 2024-05-31 PROCEDURE — G0476 HPV COMBO ASSAY CA SCREEN: HCPCS | Performed by: OBSTETRICS & GYNECOLOGY

## 2024-05-31 PROCEDURE — G0124 SCREEN C/V THIN LAYER BY MD: HCPCS | Performed by: PATHOLOGY

## 2024-05-31 PROCEDURE — G0145 SCR C/V CYTO,THINLAYER,RESCR: HCPCS | Performed by: PATHOLOGY

## 2024-05-31 PROCEDURE — 99396 PREV VISIT EST AGE 40-64: CPT | Performed by: OBSTETRICS & GYNECOLOGY

## 2024-05-31 RX ORDER — NORETHINDRONE ACETATE AND ETHINYL ESTRADIOL, ETHINYL ESTRADIOL AND FERROUS FUMARATE 1MG-10(24)
1 KIT ORAL DAILY
Qty: 84 TABLET | Refills: 4 | Status: SHIPPED | OUTPATIENT
Start: 2024-05-31

## 2024-05-31 RX ORDER — DROSPIRENONE 4 MG/1
1 TABLET, FILM COATED ORAL
COMMUNITY
Start: 2024-04-04 | End: 2024-05-31 | Stop reason: ALTCHOICE

## 2024-05-31 NOTE — PROGRESS NOTES
Subjective      Sierra Naqvi is a 49 y.o.  female who presents for new patient annual well woman exam.  Patient has a history of submucosal fibroids as previously had a hysteroscopic resection, she had additional fibroids on most recent ultrasound.  Patient is using progesterone only OCP to try to control symptoms cycles are lighter but still getting twice monthly cycles that vary from 4 to 8 days.  No other discharge or problems.   Patient reports No hot flashes/night sweats, No pain problems intercourse, No vaginal dryness, sleeping fairly well.   Counseled on trying to control bleeding pattern better with low-dose OCP estrogen progesterone combination patient desires low-dose to decrease risk for blood clot and weight gain.  Fully counseled lo Loestrin prescribed    Current contraception: oral progesterone-only contraceptive  History of abnormal Pap smear: yes -history of HPV  Family history of uterine or ovarian cancer: no  Regular self breast exam: yes  History of abnormal mammogram: no  Family history of breast cancer: no  History of maternal aunt with vulvar or vaginal cancer no specific known details    Menstrual History:  OB History          2    Para   2    Term   2            AB        Living   2         SAB        IAB        Ectopic        Multiple        Live Births   2           Obstetric Comments   Menarche 13               Menarche age: 13  Patient's last menstrual period was 2024 (approximate).       Past Medical History:   Diagnosis Date    Abnormal ECG 2019    PVCs    Abnormal Pap smear of cervix     Otitis media 2019    Skin tag     Varicella      Past Surgical History:   Procedure Laterality Date    BREAST SURGERY      MYOMECTOMY      TX HYSTEROSCOPY BX ENDOMETRIUM&/POLYPC W/WO D&C N/A 2021    Procedure: DILATATION AND CURETTAGE (D&C) WITH HYSTEROSCOPY (;  Surgeon: Antoinette Palacios DO;  Location: [facility] MAIN OR;  Service: Gynecology    TX HYSTEROSCOPY  BX ENDOMETRIUM&/POLYPC W/WO D&C N/A 2021    Procedure: resection to uterine pathology;  Surgeon: Antoinette Palacios DO;  Location: [facility] MA[facility];  Service: Gynecology     OB History          2    Para   2    Term   2            AB        Living   2         SAB        IAB        Ectopic        Multiple        Live Births   2           Obstetric Comments   Menarche 13              Current Outpatient Medications   Medication Instructions    hydrOXYzine HCL (ATARAX) 25 mg, Oral, Daily at bedtime PRN    Multiple Vitamins-Minerals (Multi-Vitamin Gummies) CHEW Oral, Daily, Juice's multivitamin    Norethin-Eth Estrad-Fe Biphas (Lo Loestrin Fe) 1 MG-10 MCG / 10 MCG TABS 1 tablet, Oral, Daily    tretinoin (REFISSA) 0.05 % cream Apply to affected area nightly    tretinoin (RETIN-A) 0.025 % cream Topical, Daily at bedtime, Please specify directions, refills and quantity     No Known Allergies  Social History     Tobacco Use    Smoking status: Never    Smokeless tobacco: Never   Vaping Use    Vaping status: Never Used   Substance Use Topics    Alcohol use: Not Currently     Comment: occasional socially    Drug use: No       Review of Systems  Review of Systems   Constitutional:  Negative for fatigue, fever and unexpected weight change.   HENT:  Negative for dental problem, sinus pressure and sinus pain.    Eyes:  Negative for visual disturbance.   Respiratory:  Negative for cough, shortness of breath and wheezing.    Cardiovascular:  Negative for chest pain and leg swelling.   Gastrointestinal:  Negative for blood in stool, constipation, diarrhea, nausea and vomiting.   Endocrine: Negative for cold intolerance, heat intolerance and polydipsia.   Genitourinary:  Positive for menstrual problem. Negative for dysuria, frequency, hematuria and pelvic pain.        Still having AUB   Musculoskeletal:  Negative for arthralgias and back pain.   Neurological:  Negative for dizziness, seizures and headaches.  "  Psychiatric/Behavioral:  The patient is not nervous/anxious.         Objective      Ht 5' 3\" (1.6 m)   Wt 49 kg (108 lb)   LMP 2024 (Approximate)   BMI 19.13 kg/m²   Vitals:    24 1414   BP: 100/74   BP Location: Right arm   Patient Position: Sitting   Weight: 49 kg (108 lb)   Height: 5' 3\" (1.6 m)     General:   alert and oriented, in no acute distress   Heart: regular rate and rhythm, S1, S2 normal, no murmur, click, rub or gallop   Lungs: clear to auscultation bilaterally   Abdomen: soft, non-tender, without masses or organomegaly   Vulva: Normal, some light menstrual flow   Vagina: normal mucosa   Cervix: no bleeding following Pap, no cervical motion tenderness, and no lesions   Uterus: normal size, mobile, non-tender   Adnexa: Normal no mass or tenderness   Breast inspection negative, no nipple discharge or bleeding, no masses or nodularity palpable  Rectal deferred, just had colonoscopy last year had a polyp plan for 3 years  Thyroid normal no masses or nodules     Assessment   49-year-old  here for new patient annual has a history of HPV recent Paps normal last Pap 2022 normal, 2024 normal mammogram has hysteroscopic resection of fibroids but some are still present with irregular every 2 week bleeding on progestin only OCP normal endometrial biopsy 2023 would like to change options to manage better if possible.  Normal negative Helix DNA screening     Plan   Lo Loestrin prescribed, fully counseled and reviewed encourage patient to get blood pressure check in a few months return in 1 year or sooner as needed.  ThinPrep with cotesting performed, given slip for next year mammogram return in 1 year or sooner as needed  " Wheelchair/Stroller

## 2024-06-11 LAB
LAB AP GYN PRIMARY INTERPRETATION: NORMAL
LAB AP LMP: NORMAL
Lab: NORMAL
PATH INTERP SPEC-IMP: NORMAL

## 2024-06-21 ENCOUNTER — NURSE TRIAGE (OUTPATIENT)
Age: 49
End: 2024-06-21

## 2024-06-21 NOTE — TELEPHONE ENCOUNTER
Regarding: Medication Inquiry  ----- Message from Jody WASSERMAN sent at 6/21/2024  4:29 PM EDT -----  Patient has concerns with new birth control she started. She has been bleeding for 21 days.

## 2024-06-21 NOTE — TELEPHONE ENCOUNTER
Reason for Disposition  • Taking birth control pills and hasn't missed taking any pills    Protocols used: Vaginal Bleeding - Abnormal-ADULT-[place]

## 2024-06-21 NOTE — TELEPHONE ENCOUNTER
"Sierra reports she has light bleeding, changing pantyliner twice daily since starting new pill. Also feeling a little more emotional.    She is toward the end of the third week.      She is unsure if she should stay on OCP or go back to prior.  She is going away on vacation next week.     She is going to continue current pill pack until complete.   She is unsure if she should  go back to [facility] or continue with lo loestrin FE    Advised not uncommon to have BTB on new OCP.  Symptoms typically resolve over 2-3 cycles.  Does not feel emotions are out of control but noticing she has been more emotional. Aware symptom may resolve as she adjusts to new OCP.      She will be out of town next week but would like Dr. Muller's recommendation when she returns to office on Tuesday if [facility] should continue current [facility]CP [facility]  OK leave message on cell v/m.    Dr. Muller, please review and advise      Answer Assessment - Initial Assessment Questions  1. AMOUNT: \"Describe the bleeding that you are having.\"     - SPOTTING: spotting, or pinkish / brownish mucous discharge; does not fill panti-liner or pad     - MILD:  less than 1 pad / hour; less than patient's usual menstrual bleeding    - MODERATE: 1-2 pads / hour; 1 menstrual cup every 6 hours; small-medium blood clots (e.g., pea, grape, small coin)    - SEVERE: soaking 2 or more pads/hour for 2 or more hours; 1 menstrual cup every 2 hours; bleeding not contained by pads or continuous red blood from vagina; large blood clots (e.g., golf ball, large coin)       Red to rust color-changing pantyliner twice daily  2. ONSET: \"When did the bleeding begin?\" \"Is it continuing now?\"      Ongoing since last visit  3. MENSTRUAL PERIOD: \"When was the last normal menstrual period?\" \"How is this different than your period?\"      2 months  4. REGULARITY: \"How regular are your periods?\"      Bleeding every 2 or 3 weeks  5. ABDOMINAL PAIN: \"Do you have any pain?\" \"How bad is the pain?\"  " "(e.g., Scale 1-10; mild, moderate, or severe)    - MILD (1-3): doesn't interfere with normal activities, abdomen soft and not tender to touch     - MODERATE (4-7): interferes with normal activities or awakens from sleep, tender to touch     - SEVERE (8-10): excruciating pain, doubled over, unable to do any normal activities       denies  6. PREGNANCY: \"Could you be pregnant?\" \"Are you sexually active?\" \"Did you recently give birth?\"      + sexually active-denies chance of pregnancy  7. BREASTFEEDING: \"Are you breastfeeding?\"      N/a  8. HORMONES: \"Are you taking any hormone medications, prescription or OTC?\" (e.g., birth control pills, estrogen)      Oral contraceptive  9. BLOOD THINNERS: \"Do you take any blood thinners?\" (e.g., Coumadin/warfarin, Pradaxa/dabigatran, aspirin)      denies  10. CAUSE: \"What do you think is causing the bleeding?\" (e.g., recent gyn surgery, recent gyn procedure; known bleeding disorder, cervical cancer, polycystic ovarian disease, fibroids)          unsure  11. HEMODYNAMIC STATUS: \"Are you weak or feeling lightheaded?\" If Yes, ask: \"Can you stand and walk normally?\"         denies  12. OTHER SYMPTOMS: \"What other symptoms are you having with the bleeding?\" (e.g., passed tissue, vaginal discharge, fever, menstrual-type cramps)        denies    Protocols used: Vaginal Bleeding - Abnormal-ADULT-[place]    "

## 2024-07-16 ENCOUNTER — OFFICE VISIT (OUTPATIENT)
Dept: FAMILY MEDICINE CLINIC | Facility: CLINIC | Age: 49
End: 2024-07-16
Payer: COMMERCIAL

## 2024-07-16 VITALS
RESPIRATION RATE: 16 BRPM | OXYGEN SATURATION: 98 % | HEIGHT: 63 IN | DIASTOLIC BLOOD PRESSURE: 72 MMHG | BODY MASS INDEX: 19.13 KG/M2 | SYSTOLIC BLOOD PRESSURE: 104 MMHG | TEMPERATURE: 98.7 F | HEART RATE: 72 BPM

## 2024-07-16 DIAGNOSIS — M54.50 ACUTE BILATERAL LOW BACK PAIN WITHOUT SCIATICA: Primary | ICD-10-CM

## 2024-07-16 PROCEDURE — 99213 OFFICE O/P EST LOW 20 MIN: CPT | Performed by: FAMILY MEDICINE

## 2024-07-16 RX ORDER — NAPROXEN 500 MG/1
500 TABLET ORAL 2 TIMES DAILY WITH MEALS
Qty: 60 TABLET | Refills: 0 | Status: SHIPPED | OUTPATIENT
Start: 2024-07-16

## 2024-07-16 RX ORDER — CYCLOBENZAPRINE HCL 10 MG
10 TABLET ORAL 3 TIMES DAILY PRN
Qty: 30 TABLET | Refills: 0 | Status: SHIPPED | OUTPATIENT
Start: 2024-07-16

## 2024-07-16 NOTE — PATIENT INSTRUCTIONS
Refer to PT  Naproxen twice daily x 1 week, then prn  Cyclobenzaprine 10mg three times daily as needed (watch for sedation)

## 2024-07-16 NOTE — PROGRESS NOTES
"Ambulatory Visit  Name: Sierra Naqvi      : 1975      MRN: [id number]  Encounter Provider: Marilee Mireles DO  Encounter Date: 2024   Encounter department: [facility]    Assessment & Plan   1. Acute bilateral low back pain without sciatica  Comments:  naproxen BID x 1 week, then prn  flexeril prn  refer to PT  if pt needs less time in car or can work from home until PT starts, lmk  Orders:  -     Ambulatory Referral to Physical Therapy; Future  -     naproxen (Naprosyn) 500 mg tablet; Take 1 tablet (500 mg total) by mouth 2 (two) times a day with meals  -     cyclobenzaprine (FLEXERIL) 10 mg tablet; Take 1 tablet (10 mg total) by mouth 3 (three) times a day as needed for muscle spasms       History of Present Illness     HPI  Pt presents c/o 3 weeks low back pain in central low back radiating to sides.  No radiation down legs or paresthesias, weakness.  Started after lifting something heavy.  Made worse by sitting in the car which she has to do for work.  Has taken ibuprofen prn.  Would like referral to PT    Review of Systems  See hpi      Objective     /72 (BP Location: Left arm, Patient Position: Sitting, Cuff Size: Standard)   Pulse 72   Temp 98.7 °F (37.1 °C)   Resp 16   Ht 5' 3\" (1.6 m)   SpO2 98%   BMI 19.13 kg/m²     Physical Exam  Constitutional:       Appearance: Normal appearance.   HENT:      Head: Normocephalic and atraumatic.   Eyes:      Extraocular Movements: Extraocular movements intact.      Conjunctiva/sclera: Conjunctivae normal.   Cardiovascular:      Rate and Rhythm: Normal rate and regular rhythm.      Heart sounds: No murmur heard.     No friction rub. No gallop.   Pulmonary:      Effort: Pulmonary effort is normal.      Breath sounds: Normal breath sounds. No wheezing, rhonchi or rales.   Musculoskeletal:      Comments: FROM Lspine  No cva tenderness     Neurological:      General: No focal deficit present.      Mental Status: She is alert " and oriented to person, place, and time.      Deep Tendon Reflexes: Reflexes normal.       Administrative Statements

## 2024-08-14 DIAGNOSIS — M54.50 ACUTE BILATERAL LOW BACK PAIN WITHOUT SCIATICA: ICD-10-CM

## 2024-08-14 RX ORDER — NAPROXEN 500 MG/1
500 TABLET ORAL 2 TIMES DAILY WITH MEALS
Qty: 60 TABLET | Refills: 0 | Status: SHIPPED | OUTPATIENT
Start: 2024-08-14

## 2024-12-11 ENCOUNTER — TELEPHONE (OUTPATIENT)
Dept: FAMILY MEDICINE CLINIC | Facility: CLINIC | Age: 49
End: 2024-12-11

## 2024-12-11 DIAGNOSIS — R73.01 IMPAIRED FASTING GLUCOSE: ICD-10-CM

## 2024-12-11 DIAGNOSIS — Z00.00 ANNUAL PHYSICAL EXAM: ICD-10-CM

## 2024-12-11 DIAGNOSIS — E55.9 VITAMIN D DEFICIENCY: Primary | ICD-10-CM

## 2024-12-11 NOTE — TELEPHONE ENCOUNTER
Patient states she has a coupon for a free freestyle meron. Patient was wondering if you could write a script for this as she is concerned about her A1C. Patient states last A1C lab result was board[place] ling high.

## 2024-12-11 NOTE — TELEPHONE ENCOUNTER
Her last A1C was 5.7, which is on the border for prediabetes.  This would not qualify her to get a glucose monitor.  Also, that lab was from 9/2023.  She is overdue for labs and an annual PE.   New labs ordered, ok to schedule PE for the spring.   Continuous glucose monitors can only be ordered for diabetes (typically with insulin use).  I do have some patients that pay out of pocket for this, but I can't write an order without an appropriate diagnosis.

## 2024-12-11 NOTE — TELEPHONE ENCOUNTER
Relayed information to pt. Pt verbalized understanding. Pt states that she has already scheduled an appointment with PCP for a physical in March, 2025. Pt states that she will complete the labs. No further concerns at this time.

## 2025-01-29 ENCOUNTER — TELEPHONE (OUTPATIENT)
Age: 50
End: 2025-01-29

## 2025-01-29 NOTE — TELEPHONE ENCOUNTER
Patient calling in asking if she needs more than a screening mammogram due to her dense breast tissue.  Advised last screening mammogram did not suggest the need for a diagnostic mammogram/ultrasound.  No further questions or concerns.

## 2025-02-26 ENCOUNTER — OFFICE VISIT (OUTPATIENT)
Dept: FAMILY MEDICINE CLINIC | Facility: CLINIC | Age: 50
End: 2025-02-26
Payer: COMMERCIAL

## 2025-02-26 VITALS
RESPIRATION RATE: 12 BRPM | BODY MASS INDEX: 19.53 KG/M2 | SYSTOLIC BLOOD PRESSURE: 100 MMHG | DIASTOLIC BLOOD PRESSURE: 62 MMHG | HEART RATE: 79 BPM | WEIGHT: 110.2 LBS | OXYGEN SATURATION: 100 % | HEIGHT: 63 IN | TEMPERATURE: 98.4 F

## 2025-02-26 DIAGNOSIS — Z00.00 ANNUAL PHYSICAL EXAM: Primary | ICD-10-CM

## 2025-02-26 DIAGNOSIS — K59.09 CONSTIPATION, CHRONIC: ICD-10-CM

## 2025-02-26 DIAGNOSIS — Z12.31 ENCOUNTER FOR SCREENING MAMMOGRAM FOR BREAST CANCER: ICD-10-CM

## 2025-02-26 DIAGNOSIS — Z82.49 FAMILY HISTORY OF EARLY CAD: ICD-10-CM

## 2025-02-26 PROCEDURE — 99396 PREV VISIT EST AGE 40-64: CPT | Performed by: FAMILY MEDICINE

## 2025-03-01 NOTE — PROGRESS NOTES
Assessment & Plan  Annual physical exam  Health maintenance  - Last mammogram on 02/08/2024 showed scattered areas of fibrous density  - Lifetime risk 4%, below threshold for supplemental screening  - All other labs normal except slightly elevated A1c  - Not on any medications  - Up to date with colonoscopy, tetanus, and shingles vaccines  - Declined pneumonia vaccine  - Mammogram order placed today  - Ordered CBC, CMP, lipids, A1c, and vitamin D tests  - Encouraged to maintain daily walking and sunscreen use  - Can schedule nurse visit for pneumonia vaccine if desired before next appointment       Encounter for screening mammogram for breast cancer    Orders:  •  Mammo screening bilateral w 3d and cad; Future    Family history of early CAD  Discussed coronary calcium scoring- pt would like to have done and will schedule  Orders:  •  CT coronary calcium score; Future    Constipation, chronic  - Advised to incorporate fiber into daily diet if well-tolerated  - If no bowel movement for 2-3 days, consider stool softener or half dose of MiraLAX  - Recommended Metamucil or Benefiber with adequate water intake           Assessment & Plan                       Subjective:     Sierra is a 49 y.o. female here today and has the below chronic conditions:    Patient Active Problem List   Diagnosis   • Vitamin D deficiency   • Family history of ischemic heart disease (IHD)   • Diffuse cystic mastopathy   • PVC (premature ventricular contraction)   • Insomnia   • Lymphadenopathy of head and neck   • Chronic eczematous otitis externa of left ear   • Restless sleeper   • TMJ syndrome   • Endometrial polyp   • S/P dilation and curettage   • SOB (shortness of breath)     Current Outpatient Medications   Medication Sig Dispense Refill   • Multiple Vitamins-Minerals (Multi-Vitamin Gummies) CHEW Chew daily Juice's multivitamin     • tretinoin (RETIN-A) 0.025 % cream Apply topically daily at bedtime Please specify directions,  refills and quantity 45 g 2     No current facility-administered medications for this visit.          Chief Complaint   Patient presents with   • Physical Exam     Annual. No new problems or concerns at this time.      HPI:  - CC above per clinical staff and reviewed.    History of Present Illness  The patient presents for a routine checkup.    Sun Damage  - Underwent laser treatment for sun damage, removing 2 dark patches  [facility]  - Now using sunscreen faithfully      Gynecological Health  - Up to date with Pap smears and gynecological visits.    Dental Health  - Regular dental check-ups.    Physical Activity  - Daily physical activity (walking 1-1.5 hours).    Vision  - Does not require corrective lenses    Medications  - Not on any medications.    Cardiac Health  - Interested in coronary calcium score test or carotid ultrasound due to family history of cardiac events.  - Reports sensation of a pulsing mass in the aorta.    Constipation  - Experiences constipation, uses fiber supplements for bowel movements.  - Without supplements, goes up to a week without a bowel movement.  - Avoids laxatives, finds relief with fiber pills but unsure of fiber content.  - Consumes salads daily with no significant impact on bowel movements.    Supplemental Information: No significant health changes since her last visit.    FAMILY HISTORY  - Maternal grandmother, maternal grandfather, and maternal uncle had significant cardiac history  - Maternal uncle and grandfather had first cardiac event in their 50s; maternal grandfather  in his 50s  - Mother has genetic hypercholesterolemia, managed with diet and exercise  - Paternal grandmother had a brain aneurysm in her 80s    IMMUNIZATIONS  - Up to date with tetanus and shingles vaccines    The following portions of the patient's history were reviewed and updated as appropriate: allergies, current medications, past family history, past medical history, past social  "history, past surgical history and problem list.    ROS:  Review of Systems   As noted above.    Objective:      /62   Pulse 79   Temp 98.4 °F (36.9 °C) (Temporal)   Resp 12   Ht 5' 3\" (1.6 m)   Wt 50 kg (110 lb 3.2 oz)   SpO2 100%   BMI 19.52 kg/m²   BP Readings from Last 3 Encounters:   02/26/25 100/62   07/16/24 104/72   05/31/24 100/74     Wt Readings from Last 3 Encounters:   02/26/25 50 kg (110 lb 3.2 oz)   05/31/24 49 kg (108 lb)   02/08/24 49.4 kg (109 lb)               Physical Exam:   Physical Exam  Vitals and nursing note reviewed.   Constitutional:       General: She is not in acute distress.     Appearance: Normal appearance. She is well-developed. She is not ill-appearing.   HENT:      Head: Normocephalic and atraumatic.      Mouth/Throat:      Mouth: Mucous membranes are moist.   Eyes:      Conjunctiva/sclera: Conjunctivae normal.   Neck:      Vascular: No carotid bruit.   Cardiovascular:      Rate and Rhythm: Normal rate and regular rhythm.      Heart sounds: Normal heart sounds. No murmur heard.  Pulmonary:      Effort: Pulmonary effort is normal. No respiratory distress.      Breath sounds: Normal breath sounds. No wheezing.   Abdominal:      Palpations: Abdomen is soft.      Tenderness: There is no abdominal tenderness. There is no guarding or rebound.   Musculoskeletal:      Cervical back: Neck supple.      Right lower leg: No edema.      Left lower leg: No edema.   Lymphadenopathy:      Cervical: No cervical adenopathy.   Skin:     General: Skin is warm and dry.   Neurological:      Mental Status: She is alert and oriented to person, place, and time.   Psychiatric:         Mood and Affect: Mood normal.         Behavior: Behavior normal.               Depression Screening and Follow-up Plan: Patient was screened for depression during today's encounter. They screened negative with a PHQ-2 score of 0.          This office visit note was generated in part with the use of [employer] " and/or voice recognition dictation.

## 2025-03-05 ENCOUNTER — HOSPITAL ENCOUNTER (OUTPATIENT)
Dept: CT IMAGING | Facility: HOSPITAL | Age: 50
Discharge: HOME | End: 2025-03-05
Payer: COMMERCIAL

## 2025-03-05 DIAGNOSIS — Z82.49 FAMILY HISTORY OF EARLY CAD: ICD-10-CM

## 2025-03-05 PROCEDURE — 75571 CT HRT W/O DYE W/CA TEST: CPT

## 2025-03-10 ENCOUNTER — RESULTS FOLLOW-UP (OUTPATIENT)
Dept: FAMILY MEDICINE CLINIC | Facility: CLINIC | Age: 50
End: 2025-03-10

## 2025-03-10 DIAGNOSIS — R93.1 AGATSTON CORONARY ARTERY CALCIUM SCORE LESS THAN 100: Primary | ICD-10-CM

## 2025-04-10 ENCOUNTER — EVALUATION (OUTPATIENT)
Dept: PHYSICAL THERAPY | Facility: CLINIC | Age: 50
End: 2025-04-10
Payer: COMMERCIAL

## 2025-04-10 DIAGNOSIS — M54.50 ACUTE BILATERAL LOW BACK PAIN WITHOUT SCIATICA: ICD-10-CM

## 2025-04-10 PROCEDURE — 97112 NEUROMUSCULAR REEDUCATION: CPT | Performed by: PHYSICAL THERAPIST

## 2025-04-10 PROCEDURE — 97162 PT EVAL MOD COMPLEX 30 MIN: CPT | Performed by: PHYSICAL THERAPIST

## 2025-04-10 NOTE — PROGRESS NOTES
PT Evaluation     Today's date: 4/10/2025  Patient name: Sierra Naqvi  : 1975  MRN: [id number]  Referring provider: Marilee Mireles DO  Dx:   Encounter Diagnosis     ICD-10-CM    1. Acute bilateral low back pain without sciatica  M54.50 Ambulatory Referral to Physical Therapy    naproxen BID x 1 week, then prn  flexeril prn  refer to PT  if pt needs less time in car or can work from home until PT starts, lmk          Start Time: 1705  Stop Time: 1743  Total time in clinic (min): 38 minutes    Assessment  Impairments: abnormal or restricted ROM, activity intolerance, impaired physical strength, lacks appropriate home exercise program, pain with function, poor posture , poor body mechanics, participation limitations, activity limitations and endurance  Symptom irritability: low    Assessment details: Sierra Naqvi is a pleasant 50 y.o. female who presents with chronic low back pain which initially onset back in 2023 but then subsided on its own. More recently, about 3 weeks ago, she reports increased pain after participating in some yoga. She presents today with no pain. She is required to sit most of her day for her job and sitting is provocative for her symptoms.  The patient's greatest concerns are the pain she is experiencing, worry over not knowing what's wrong, fear of not being able to keep active, and future ill health (and wanting to prevent it).      No further referral appears necessary at this time based upon examination results.    Primary movement impairment diagnosis of lumbar extension deficit, with decreased lumbar mobility, decreased core and hip strength and endurance and poor tolerance for sustained sitting, resulting in pathoanatomical symptoms of pain and tightness and limiting her ability to drive, exercise or recreation, go to work, and sit without limitation.    Primary Impairments:  1) lumbar extension deficit  2) decreased lumbar mobility  3) decreased core/hip strength  and endurance  4) poor tolerance for sustained positions    Etiologic factors include recently participating in yoga.    Understanding of Dx/Px/POC: good     Prognosis: good  Prognosis details: Positive prognostic indicators include positive attitude toward recovery, good understanding of diagnosis and treatment plan options, absence of peripheralization, and absence of observed red flags.  Negative prognostic indicators include chronicity of symptoms and prior treatment in the past for her LBP.      Goals  (STG) Impairment Goals 4-6 weeks  - Decrease pain to 0/10 with sitting and work duties  - Improve lumbar AROM to 100% and pain-free throughout  - Increase hip strength to 4+/5 throughout  - Utilize abdominal muscles with lifting and transitional movements  - Compliance with lumbar support    (LTG) Functional Goals 6-8 weeks  - Return to Prior Level of Function  - Increase Functional Status Measure (FOTO) to: >predicted outcome  - Patient will be independent with HEP  - Patient will be able to sit without increased pain/compensation/difficulty  - Patient will be able to perform sit to stand without increased pain/compensation/difficulty   - Patient will be able to participate in yoga/exercise without increased pain/compensation/difficulty         Plan  Patient would benefit from: skilled physical therapy  Planned modality interventions: Modalities PRN.    Planned therapy interventions: activity modification, neuromuscular re-education, patient education, therapeutic activities, therapeutic exercise, graded activity, home exercise program, abdominal trunk stabilization, body mechanics training, behavior modification, flexibility, functional ROM exercises, graded exercise, stretching, strengthening, postural training and joint mobilization    Frequency: 1-2x month  Duration in weeks: 12  Treatment plan discussed with: patient  Plan details: We will follow up in 3 weeks time. She will call if any issues arise sooner.  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   HISTORY OF CURRENT INJURY:  Patient reports that about 3 weeks ago she was doing some yoga at home and noticed some soreness that was radiating from her low back and even around the front. She has had a decrease in symptoms since initial onset but a few nights ago she did have some mild increase while sleeping on an unsupportive mattress. She does feel that her back pain is the same pain she was having a few months ago which had previously gone away. She has no pain today or yesterday but did have pain as recent as Tuesday.   PAIN LOCATION/DESCRIPTORS: central low back, radiating around to the front    AGGRAVATING FACTORS: sitting and certain positions   EASES: not taking anything for the pain, walking   IMAGING: none   SPECIAL QUESTIONS: denies numbness/tingling in her legs   Sierra denies a new onset of Bladder incontinence, Bowel dysfunction, Recent unexplained weight loss, Constant night pain, History of cancer, Tingling, Numbness, and Saddle anesthesia .  PATIENT GOALS: \"To get some exercises to strengthen what is weak\"    Patient Goals  Patient goals for therapy: decreased pain    Pain  Current pain ratin  At best pain ratin  At worst pain ratin  Quality: dull ache (stabbing)  Relieving factors: change in position (standing & walking)  Aggravating factors: sitting  Progression: improved    Treatments  Previous treatment: physical therapy      Objective     Postural Observations  Seated posture: fair  Standing posture: fair      Neurological Testing     Sensation     Lumbar   Left   Intact: light touch    Right   Intact: light touch    Reflexes   Left   Patellar (L4): normal (2+)  Achilles (S1): normal (2+)    Right   Patellar (L4): normal (2+)  Achilles (S1): " "normal (2+)    Active Range of Motion     Lumbar   Flexion:  Restriction level: minimal  Extension:  Restriction level: moderate  Left lateral flexion:  WFL  Right lateral flexion:  WFL  Left rotation:  WFL  Right rotation:  WFL    Joint Play     Hypomobile: L1, L2, L3, L4 and L5     Pain: L3 and L4     Strength/Myotome Testing     Left Hip   Planes of Motion   Flexion: 4-  Extension: 4-  Abduction: 3+  Adduction: 4  External rotation: 4  Internal rotation: 4    Right Hip   Planes of Motion   Flexion: 4-  Extension: 4  Abduction: 4-  Adduction: 4  External rotation: 4  Internal rotation: 4    Left Knee   Flexion: 4+  Extension: 4+    Right Knee   Flexion: 4+  Extension: 4+    Left Ankle/Foot   Dorsiflexion: 4+  Plantar flexion: 4+  Great toe extension: 4+    Right Ankle/Foot   Dorsiflexion: 4+  Plantar flexion: 4+  Great toe extension: 4+    Muscle Activation   Patient unable to activate left transverse abdominals, left multifidus, right transverse abdominals and right multifidus.     Tests     Lumbar   Negative prone instability  and SIJ compression.     Left   Negative crossed SLR, passive SLR and slump test.     Right   Negative crossed SLR, passive SLR and slump test.     Left Pelvic Girdle/Sacrum   Negative: active SLR test.     Right Pelvic Girdle/Sacrum   Negative: active SLR test.     Left Hip   Negative SALVADOR and FADIR.     Right Hip   Negative SALVADOR and FADIR.               POC Expires Auth Status Start Date Expiration Date PT Visit Limit    IE 4/10/25 NA   BOMN   Date 4/10       Used 1       Remaining           Diagnosis: Chronic LBP   Precautions: none   Primary Goals: extension based program- lumbar mobility, core/hi strength    *asterisks by exercise = given for HEP   Manuals 4/10       Prone PA mobs     DO FOTO                                   There Ex                                                                                Neuro Re-Ed        Prone press ups* 10x 3\"       Standing lumbar " "extension* 10x 3\"       Open books* 10x 3\"       Prone alt arm/leg lifts* 2x10 3\"       Bridges* 2x10 3\"       Clamshells* 2x10 3\" GTB                                               Patient education Diagnosis, prognosis, activity modification, extension based postures, lumbar support, HEP        Re-evaluation              Ther Act                                         Modalities                                                          "

## 2025-04-24 ENCOUNTER — APPOINTMENT (OUTPATIENT)
Dept: LAB | Facility: CLINIC | Age: 50
End: 2025-04-24
Payer: COMMERCIAL

## 2025-04-24 ENCOUNTER — RESULTS FOLLOW-UP (OUTPATIENT)
Dept: FAMILY MEDICINE CLINIC | Facility: CLINIC | Age: 50
End: 2025-04-24

## 2025-04-24 DIAGNOSIS — R73.01 IMPAIRED FASTING GLUCOSE: ICD-10-CM

## 2025-04-24 DIAGNOSIS — E55.9 VITAMIN D DEFICIENCY: ICD-10-CM

## 2025-04-24 DIAGNOSIS — Z00.00 ANNUAL PHYSICAL EXAM: ICD-10-CM

## 2025-04-24 LAB
25(OH)D3 SERPL-MCNC: 43 NG/ML (ref 30–100)
ALBUMIN SERPL BCG-MCNC: 4.4 G/DL (ref 3.5–5)
ALP SERPL-CCNC: 42 U/L (ref 34–104)
ALT SERPL W P-5'-P-CCNC: 11 U/L (ref 7–52)
ANION GAP SERPL CALCULATED.3IONS-SCNC: 10 MMOL/L (ref 4–13)
AST SERPL W P-5'-P-CCNC: 16 U/L (ref 13–39)
BILIRUB SERPL-MCNC: 0.76 MG/DL (ref 0.2–1)
BUN SERPL-MCNC: 10 MG/DL (ref 5–25)
CALCIUM SERPL-MCNC: 9.1 MG/DL (ref 8.4–10.2)
CHLORIDE SERPL-SCNC: 104 MMOL/L (ref 96–108)
CHOLEST SERPL-MCNC: 190 MG/DL (ref ?–200)
CO2 SERPL-SCNC: 25 MMOL/L (ref 21–32)
CREAT SERPL-MCNC: 0.95 MG/DL (ref 0.6–1.3)
EST. AVERAGE GLUCOSE BLD GHB EST-MCNC: 108 MG/DL
GFR SERPL CREATININE-BSD FRML MDRD: 69 ML/MIN/1.73SQ M
GLUCOSE P FAST SERPL-MCNC: 98 MG/DL (ref 65–99)
HBA1C MFR BLD: 5.4 %
HDLC SERPL-MCNC: 65 MG/DL
LDLC SERPL CALC-MCNC: 114 MG/DL (ref 0–100)
POTASSIUM SERPL-SCNC: 4 MMOL/L (ref 3.5–5.3)
PROT SERPL-MCNC: 7.2 G/DL (ref 6.4–8.4)
SODIUM SERPL-SCNC: 139 MMOL/L (ref 135–147)
TRIGL SERPL-MCNC: 55 MG/DL (ref ?–150)

## 2025-04-24 PROCEDURE — 82306 VITAMIN D 25 HYDROXY: CPT

## 2025-04-24 PROCEDURE — 80061 LIPID PANEL: CPT

## 2025-04-24 PROCEDURE — 83036 HEMOGLOBIN GLYCOSYLATED A1C: CPT

## 2025-04-24 PROCEDURE — 80053 COMPREHEN METABOLIC PANEL: CPT

## 2025-04-24 PROCEDURE — 36415 COLL VENOUS BLD VENIPUNCTURE: CPT

## 2025-04-29 ENCOUNTER — HOSPITAL ENCOUNTER (OUTPATIENT)
Dept: RADIOLOGY | Facility: MEDICAL CENTER | Age: 50
Discharge: HOME | End: 2025-04-29
Payer: COMMERCIAL

## 2025-04-29 VITALS — WEIGHT: 110 LBS | HEIGHT: 63 IN | BODY MASS INDEX: 19.49 KG/M2

## 2025-04-29 DIAGNOSIS — Z12.31 ENCOUNTER FOR SCREENING MAMMOGRAM FOR BREAST CANCER: ICD-10-CM

## 2025-04-29 PROCEDURE — 77063 BREAST TOMOSYNTHESIS BI: CPT

## 2025-04-29 PROCEDURE — 77067 SCR MAMMO BI INCL CAD: CPT

## 2025-05-07 ENCOUNTER — OFFICE VISIT (OUTPATIENT)
Dept: OBGYN CLINIC | Facility: CLINIC | Age: 50
End: 2025-05-07
Payer: COMMERCIAL

## 2025-05-07 VITALS
DIASTOLIC BLOOD PRESSURE: 70 MMHG | SYSTOLIC BLOOD PRESSURE: 108 MMHG | HEIGHT: 63 IN | BODY MASS INDEX: 19.67 KG/M2 | WEIGHT: 111 LBS

## 2025-05-07 DIAGNOSIS — N92.6 IRREGULAR UTERINE BLEEDING: Primary | ICD-10-CM

## 2025-05-07 DIAGNOSIS — Z12.4 CERVICAL CANCER SCREENING: ICD-10-CM

## 2025-05-07 PROCEDURE — 99213 OFFICE O/P EST LOW 20 MIN: CPT | Performed by: OBSTETRICS & GYNECOLOGY

## 2025-05-07 RX ORDER — ACETAMINOPHEN AND CODEINE PHOSPHATE 120; 12 MG/5ML; MG/5ML
1 SOLUTION ORAL DAILY
Qty: 84 TABLET | Refills: 2 | Status: SHIPPED | OUTPATIENT
Start: 2025-05-07

## 2025-05-07 NOTE — PROGRESS NOTES
Subjective     Sierra Naqvi is a 50 y.o. G2, P2 female here for a problem visit.  Patient has history of known submucosal fibroids.  Patient was on Slynd to manage bleeding previously last May we tried a combination OCP lo Loestrin which was somewhat effective but still patient had breakthrough bleeding.  Had normal endometrial biopsy 2023.  Due to breakthrough bleeding patient stopped lo Loestrin and opted not to take any medications.  She was having some extra bleeding but the last few months it has increased where she will have spotting and 4 days of heavy flow for her menses followed by more spotting.  The heavier flow is once a month.  The spotting is generally daily where she only gets 1 or maybe 2 weeks without bleeding.  Occasionally intercourse can trigger her spotting a few days after but not always.  We discussed and reviewed submucosal fibroids and options to treat possibly including but not limited to progesterone only OCP, combination OCP, progesterone IUD, hysteroscopic resection, ablation based on size, hysterectomy.  Patient would like to obtain updated ultrasound first and would also like to try lowest dose progesterone pill.  Had recent blood work but did not have CBC and iron panel done so I will order this.   Patient desired repeat Pap smear today she has a history of positive high risk HPV and desires yearly pap, she is aware that insurance may not cover this that she is not 365 days and it depends on her insurance.  Patient reports she desires to have it done today.        Gynecologic History  Patient's last menstrual period was 04/15/2025.  Contraception: vasectomy  Last Pap: May 31, 2024. Results were: normal  Last mammogram: 2025. Results were: normal, dense fully counseled on dense breast    Obstetric History  OB History    Para Term  AB Living   2 2 2   2   SAB IAB Ectopic Multiple Live Births       2      # Outcome Date GA Lbr Conor/2nd Weight Sex Type Anes  "PTL Lv   2 Term 2006     Vag-Spont   FRANSISCO   1 Term 2001     Vag-Spont   FRANSISCO      Obstetric Comments   Menarche 13          The following portions of the patient's history were reviewed and updated as appropriate: allergies, current medications, past family history, past medical history, past social history, past surgical history, and problem list.    Review of Systems  Review of Systems   Constitutional: Negative.  Negative for chills, fatigue, fever and unexpected weight change.   HENT: Negative.  Negative for dental problem, sinus pressure and sinus pain.    Eyes: Negative.  Negative for visual disturbance.   Respiratory: Negative.  Negative for cough, shortness of breath and wheezing.    Cardiovascular: Negative.  Negative for chest pain and leg swelling.   Gastrointestinal:  Positive for constipation. Negative for diarrhea, nausea and vomiting.   Genitourinary:  Positive for vaginal bleeding. Negative for menstrual problem, pelvic pain and urgency.        Irregular bleeding   Musculoskeletal: Negative.  Negative for back pain.   Allergic/Immunologic: Positive for environmental allergies.   Neurological:  Negative for dizziness and headaches.        Objective     /70 (BP Location: Left arm, Patient Position: Sitting, Cuff Size: Standard)   Ht 5' 3\" (1.6 m)   Wt 50.3 kg (111 lb)   LMP  (LMP Unknown)   BMI 19.66 kg/m²   General appearance: alert and oriented, in no acute distress  Head: Normocephalic, without obvious abnormality, atraumatic  Breasts: normal appearance, no masses or tenderness, No nipple retraction or dimpling, No nipple discharge or bleeding, No axillary or supraclavicular adenopathy, Normal to palpation without dominant masses  Pelvic: external genitalia normal, vagina normal without discharge, uterus normal size, shape, and consistency, no cervical motion tenderness, cervix normal in appearance, no adnexal masses or tenderness, and minimal spotting noted  Extremities: extremities normal, " warm and well-perfused; no cyanosis, clubbing, or edema      Assessment & Plan  Irregular uterine bleeding  50-year-old G2, P2 with known submucosal fibroids and irregular prolonged bleeding monthly.  Patient counseled.  Orders:    CBC and differential; Future    Iron Panel (Includes Ferritin, Iron Sat%, Iron, and TIBC); Future    US pelvis complete w transvaginal; Future    norethindrone (Ortho Micronor) 0.35 MG tablet; Take 1 tablet (0.35 mg total) by mouth daily    Cervical cancer screening  Last Pap May 31, 2024 normal, last mammogram April 2025 normal dense counseled  Orders:    Thinprep Tis and HPV mRNA E6/E7

## 2025-05-08 ENCOUNTER — HOSPITAL ENCOUNTER (OUTPATIENT)
Dept: ULTRASOUND IMAGING | Facility: HOSPITAL | Age: 50
End: 2025-05-08
Attending: OBSTETRICS & GYNECOLOGY
Payer: COMMERCIAL

## 2025-05-08 DIAGNOSIS — N92.6 IRREGULAR UTERINE BLEEDING: ICD-10-CM

## 2025-05-08 PROCEDURE — 76830 TRANSVAGINAL US NON-OB: CPT

## 2025-05-08 PROCEDURE — 76856 US EXAM PELVIC COMPLETE: CPT

## 2025-05-12 ENCOUNTER — RESULTS FOLLOW-UP (OUTPATIENT)
Dept: OBGYN CLINIC | Facility: CLINIC | Age: 50
End: 2025-05-12

## 2025-05-12 LAB
CLINICAL INFO: NORMAL
CYTO CVX: NORMAL
CYTOLOGY CMNT CVX/VAG CYTO-IMP: NORMAL
DATE PREVIOUS BX: NORMAL
HPV E6+E7 MRNA CVX QL NAA+PROBE: NOT DETECTED
LMP START DATE: NORMAL
SL AMB PREV. PAP:: NORMAL
SPECIMEN SOURCE CVX/VAG CYTO: NORMAL

## 2025-06-06 ENCOUNTER — TELEPHONE (OUTPATIENT)
Age: 50
End: 2025-06-06

## 2025-06-06 DIAGNOSIS — H10.029 PINK EYE DISEASE, UNSPECIFIED LATERALITY: Primary | ICD-10-CM

## 2025-06-06 RX ORDER — POLYMYXIN B SULFATE AND TRIMETHOPRIM 1; 10000 MG/ML; [USP'U]/ML
1 SOLUTION OPHTHALMIC 4 TIMES DAILY
Qty: 10 ML | Refills: 0 | Status: SHIPPED | OUTPATIENT
Start: 2025-06-06

## 2025-06-06 NOTE — TELEPHONE ENCOUNTER
PCP out of office today. Please advise if medication can be sent in or if pt should be seen at an urgent care.

## 2025-06-06 NOTE — TELEPHONE ENCOUNTER
Pt called to request a virtual visit today.  [place] in [place], [place] is an hour away from the office.  Last night, she started with pink eye in her right eye.  It is weepy, red and has mucus.  First available appointment within the practice was not until Monday 6/9/25.  Pt asked if something could be called in.  [facility] [address]  Please advise.

## 2025-06-30 ENCOUNTER — APPOINTMENT (OUTPATIENT)
Dept: LAB | Facility: CLINIC | Age: 50
End: 2025-06-30
Payer: COMMERCIAL

## 2025-06-30 DIAGNOSIS — N92.6 IRREGULAR UTERINE BLEEDING: ICD-10-CM

## 2025-06-30 LAB
BASOPHILS # BLD AUTO: 0.08 THOUSANDS/ÂΜL (ref 0–0.1)
BASOPHILS NFR BLD AUTO: 2 % (ref 0–1)
EOSINOPHIL # BLD AUTO: 0.05 THOUSAND/ÂΜL (ref 0–0.61)
EOSINOPHIL NFR BLD AUTO: 1 % (ref 0–6)
ERYTHROCYTE [DISTWIDTH] IN BLOOD BY AUTOMATED COUNT: 12.6 % (ref 11.6–15.1)
FERRITIN SERPL-MCNC: 7 NG/ML (ref 30–307)
HCT VFR BLD AUTO: 38.8 % (ref 34.8–46.1)
HGB BLD-MCNC: 12.5 G/DL (ref 11.5–15.4)
IMM GRANULOCYTES # BLD AUTO: 0.02 THOUSAND/UL (ref 0–0.2)
IMM GRANULOCYTES NFR BLD AUTO: 0 % (ref 0–2)
IRON SATN MFR SERPL: 18 % (ref 15–50)
IRON SERPL-MCNC: 68 UG/DL (ref 50–212)
LYMPHOCYTES # BLD AUTO: 1.3 THOUSANDS/ÂΜL (ref 0.6–4.47)
LYMPHOCYTES NFR BLD AUTO: 25 % (ref 14–44)
MCH RBC QN AUTO: 30.8 PG (ref 26.8–34.3)
MCHC RBC AUTO-ENTMCNC: 32.2 G/DL (ref 31.4–37.4)
MCV RBC AUTO: 96 FL (ref 82–98)
MONOCYTES # BLD AUTO: 0.66 THOUSAND/ÂΜL (ref 0.17–1.22)
MONOCYTES NFR BLD AUTO: 13 % (ref 4–12)
NEUTROPHILS # BLD AUTO: 3.18 THOUSANDS/ÂΜL (ref 1.85–7.62)
NEUTS SEG NFR BLD AUTO: 59 % (ref 43–75)
NRBC BLD AUTO-RTO: 0 /100 WBCS
PLATELET # BLD AUTO: 302 THOUSANDS/UL (ref 149–390)
PMV BLD AUTO: 9.6 FL (ref 8.9–12.7)
RBC # BLD AUTO: 4.06 MILLION/UL (ref 3.81–5.12)
TIBC SERPL-MCNC: 368.2 UG/DL (ref 250–450)
TRANSFERRIN SERPL-MCNC: 263 MG/DL (ref 203–362)
UIBC SERPL-MCNC: 300 UG/DL (ref 155–355)
WBC # BLD AUTO: 5.29 THOUSAND/UL (ref 4.31–10.16)

## 2025-06-30 PROCEDURE — 36415 COLL VENOUS BLD VENIPUNCTURE: CPT

## 2025-06-30 PROCEDURE — 83540 ASSAY OF IRON: CPT

## 2025-06-30 PROCEDURE — 85025 COMPLETE CBC W/AUTO DIFF WBC: CPT

## 2025-06-30 PROCEDURE — 83550 IRON BINDING TEST: CPT

## 2025-06-30 PROCEDURE — 82728 ASSAY OF FERRITIN: CPT

## 2025-07-03 ENCOUNTER — PATIENT MESSAGE (OUTPATIENT)
Dept: FAMILY MEDICINE CLINIC | Facility: CLINIC | Age: 50
End: 2025-07-03

## 2025-07-11 ENCOUNTER — TELEPHONE (OUTPATIENT)
Age: 50
End: 2025-07-11

## 2025-07-11 NOTE — TELEPHONE ENCOUNTER
Phone call to patient and reviewed Per Dr. Muller: Ortho Micronor is designed to be taken daily without taking time off for the menses. Patient should continue to take a dose every day and hopefully cycles will become better controlled or at least much lighter. Patient verbalzied understanding and thankful for  follow up.

## 2025-07-11 NOTE — TELEPHONE ENCOUNTER
Incoming call from patient - is currently taking ortho micronor daily - prescribed 5/7/25. Would like to know if she should continue taking daily without any breaks for menses. Patient state that last month bleeding improved and only had bleeding for 2 weeks out of the month which is an improvement.     Routing to provider for review.

## 2025-07-23 ENCOUNTER — ANNUAL EXAM (OUTPATIENT)
Dept: OBGYN CLINIC | Facility: CLINIC | Age: 50
End: 2025-07-23
Payer: COMMERCIAL

## 2025-07-23 VITALS
WEIGHT: 112 LBS | SYSTOLIC BLOOD PRESSURE: 112 MMHG | BODY MASS INDEX: 19.84 KG/M2 | DIASTOLIC BLOOD PRESSURE: 68 MMHG | HEIGHT: 63 IN

## 2025-07-23 DIAGNOSIS — Z01.419 ENCOUNTER FOR GYNECOLOGICAL EXAMINATION WITHOUT ABNORMAL FINDING: Primary | ICD-10-CM

## 2025-07-23 DIAGNOSIS — N92.6 IRREGULAR UTERINE BLEEDING: ICD-10-CM

## 2025-07-23 DIAGNOSIS — Z12.31 ENCOUNTER FOR SCREENING MAMMOGRAM FOR MALIGNANT NEOPLASM OF BREAST: ICD-10-CM

## 2025-07-23 PROCEDURE — S0612 ANNUAL GYNECOLOGICAL EXAMINA: HCPCS | Performed by: OBSTETRICS & GYNECOLOGY

## 2025-07-23 RX ORDER — ACETAMINOPHEN AND CODEINE PHOSPHATE 120; 12 MG/5ML; MG/5ML
1 SOLUTION ORAL DAILY
Qty: 84 TABLET | Refills: 3 | Status: SHIPPED | OUTPATIENT
Start: 2025-07-23

## 2025-07-23 RX ORDER — PROGESTERONE 100 MG/1
CAPSULE ORAL
COMMUNITY
End: 2025-07-23 | Stop reason: ALTCHOICE

## 2025-07-23 NOTE — PROGRESS NOTES
Subjective      Sierra Naqvi is a 50 y.o. G2, P2 female who presents for annual well woman exam. Periods are occurring on second week of Micronor pack generally about 6 days long somewhat better controlled on Micronor.  It seems to correspond with her daughters cycle time.  Patient will continue observe her cycles this is her 2nd or 3rd month of Micronor.  No intermenstrual bleeding, spotting, or discharge.  Patient reports No hot flashes/night sweats, No pain problems intercourse, No vaginal dryness, sleeping frequently interrupted usually able to get back to sleep discussed perimenopause in general.   We also discussed her ultrasound and fibroids as well as the submucosal fibroid briefly reviewed again options to treat which we had previously reviewed in more detail at her May appointment.  Patient will use Micronor for now and consider options.  Patient had Pap and pelvic done at May appointment reviewed normal Pap.    Current contraception: vasectomy  History of abnormal Pap smear: yes -history of abnormal  Family history of uterine or ovarian cancer: yes -maternal aunt vaginal cancer  Regular self breast exam: yes/implants bilateral  History of abnormal mammogram: no  Family history of breast cancer: no    Menstrual History:  OB History          2    Para   2    Term   2            AB        Living   2         SAB        IAB        Ectopic        Multiple        Live Births   2           Obstetric Comments   Menarche 13               Menarche age: 13  No LMP recorded.       The following portions of the patient's history were reviewed and updated as appropriate: allergies, current medications, past family history, past medical history, past social history, past surgical history, and problem list.  Past Medical History[1]  Past Surgical History[2]  OB History          2    Para   2    Term   2            AB        Living   2         SAB        IAB        Ectopic        Multiple  "       Live Births   2           Obstetric Comments   Menarche 13                Review of Systems  Review of Systems   Constitutional: Negative.  Negative for chills, fatigue, fever and unexpected weight change.   HENT: Negative.  Negative for dental problem, sinus pressure and sinus pain.    Eyes: Negative.  Negative for visual disturbance.   Respiratory: Negative.  Negative for cough, shortness of breath and wheezing.    Cardiovascular: Negative.  Negative for chest pain and leg swelling.   Gastrointestinal:  Positive for constipation. Negative for diarrhea, nausea and vomiting.   Genitourinary:  Negative for menstrual problem, pelvic pain and urgency.        Irregular bleeding   Musculoskeletal: Negative.  Negative for back pain.   Allergic/Immunologic: Negative for environmental allergies.   Neurological: Negative.  Negative for dizziness and headaches.     Objective     Vitals:    07/23/25 0935   BP: 112/68   BP Location: Left arm   Patient Position: Sitting   Cuff Size: Standard   Weight: 50.8 kg (112 lb)   Height: 5' 3\" (1.6 m)     General:   alert and oriented, in no acute distress   Heart: regular rate and rhythm, S1, S2 normal, no murmur, click, rub or gallop   Lungs: clear to auscultation bilaterally   Abdomen: soft, non-tender, without masses or organomegaly   Vulva: Pelvic exam normal at May 2025 visit   Vagina:    Cervix:    Uterus:    Adnexa:    Breast inspection negative, no nipple discharge or bleeding, no masses or nodularity palpable, intact implants bilaterally  Rectal deferred, colonoscopy due in about 2 years  Anus/Perineum normal appearance at May 2025 visit  Thyroid normal no masses or nodules    Assessment & Plan  Encounter for gynecological examination without abnormal finding  50-year-old G2, P2 with some small fibroids and a submucosal fibroid causing irregular heavy bleeding somewhat controlled on Micronor at this time.  Last Pap May 2025 normal, last mammogram April 2025 normal, last " colonoscopy August 2023 good for 5 years.  Mammogram ordered, continue Micronor return in 1 year or sooner as needed       Irregular uterine bleeding    Orders:    norethindrone (Ortho Micronor) 0.35 MG tablet; Take 1 tablet (0.35 mg total) by mouth daily    Encounter for screening mammogram for malignant neoplasm of breast    Orders:    Mammo screening bilateral w 3d and cad; Future              [1]   Past Medical History:  Diagnosis Date    Abnormal ECG April 2019    PVCs    Abnormal Pap smear of cervix     Arrhythmia April 2018    PVC    Otitis media 11/2019    Skin tag     Sleep difficulties ongoing    Varicella    [2]   Past Surgical History:  Procedure Laterality Date    AUGMENTATION MAMMAPLASTY Bilateral     2023    BREAST SURGERY      COSMETIC SURGERY  1/12/2023    Dr. Nazario Bernstein, [place], [place]    MYOMECTOMY      [place] HYSTEROSCOPY BX ENDOMETRIUM&/POLYPC W/WO D&C N/A 09/03/2021    Procedure: DILATATION AND CURETTAGE (D&C) WITH HYSTEROSCOPY (;  Surgeon: Antoinette Palacios DO;  Location: [facility] MAIN OR;  Service: Gynecology    [place] HYSTEROSCOPY BX ENDOMETRIUM&/POLYPC W/WO D&C N/A 09/03/2021    Procedure: resection to uterine pathology;  Surgeon: Antoinette Palacios DO;  Location: [facility] MAIN OR;  Service: Gynecology

## 2025-08-21 DIAGNOSIS — N92.6 IRREGULAR UTERINE BLEEDING: ICD-10-CM

## 2025-08-21 RX ORDER — NORETHINDRONE 0.35 MG/1
TABLET ORAL
Qty: 84 TABLET | Refills: 1 | Status: SHIPPED | OUTPATIENT
Start: 2025-08-21

## (undated) DEVICE — PREMIUM DRY TRAY LF: Brand: MEDLINE INDUSTRIES, INC.

## (undated) DEVICE — SPONGE LAP 18 X 18 IN STRL RFD

## (undated) DEVICE — BETHLEHEM UNIVERSAL MINOR VAG: Brand: CARDINAL HEALTH

## (undated) DEVICE — UNDER BUTTOCKS DRAPE W/FLUID CONTROL POUCH: Brand: CONVERTORS

## (undated) DEVICE — DEVICE MYOSURE TISSUE REMOVAL HYSTEROSCOPIC

## (undated) DEVICE — MYOSURE SEAL SET

## (undated) DEVICE — LIGHT HANDLE COVER SLEEVE DISP BLUE STELLAR

## (undated) DEVICE — GLOVE PI ULTRA TOUCH SZ.7.0

## (undated) DEVICE — MAYO STAND COVER: Brand: CONVERTORS

## (undated) DEVICE — PVC URETHRAL CATHETER: Brand: DOVER

## (undated) DEVICE — CHLORHEXIDINE 4PCT 4 OZ

## (undated) DEVICE — GLOVE INDICATOR PI UNDERGLOVE SZ 7.5 BLUE

## (undated) DEVICE — PACK FLUENT DISP